# Patient Record
Sex: FEMALE | Race: BLACK OR AFRICAN AMERICAN | NOT HISPANIC OR LATINO | URBAN - METROPOLITAN AREA
[De-identification: names, ages, dates, MRNs, and addresses within clinical notes are randomized per-mention and may not be internally consistent; named-entity substitution may affect disease eponyms.]

---

## 2017-11-12 ENCOUNTER — EMERGENCY (EMERGENCY)
Facility: HOSPITAL | Age: 83
LOS: 1 days | Discharge: HOME | End: 2017-11-12
Attending: EMERGENCY MEDICINE | Admitting: EMERGENCY MEDICINE
Payer: MEDICARE

## 2017-11-12 VITALS
OXYGEN SATURATION: 100 % | RESPIRATION RATE: 18 BRPM | SYSTOLIC BLOOD PRESSURE: 143 MMHG | DIASTOLIC BLOOD PRESSURE: 68 MMHG | HEART RATE: 90 BPM

## 2017-11-12 DIAGNOSIS — J34.9 UNSPECIFIED DISORDER OF NOSE AND NASAL SINUSES: Chronic | ICD-10-CM

## 2017-11-12 PROCEDURE — 99284 EMERGENCY DEPT VISIT MOD MDM: CPT | Mod: GC

## 2017-11-12 RX ORDER — DIAZEPAM 5 MG
1 TABLET ORAL
Qty: 7 | Refills: 0 | OUTPATIENT
Start: 2017-11-12 | End: 2017-11-19

## 2017-11-12 RX ORDER — LIDOCAINE 4 G/100G
1 CREAM TOPICAL ONCE
Qty: 0 | Refills: 0 | Status: COMPLETED | OUTPATIENT
Start: 2017-11-12 | End: 2017-11-12

## 2017-11-12 RX ORDER — KETOROLAC TROMETHAMINE 30 MG/ML
15 SYRINGE (ML) INJECTION ONCE
Qty: 0 | Refills: 0 | Status: DISCONTINUED | OUTPATIENT
Start: 2017-11-12 | End: 2017-11-12

## 2017-11-12 RX ORDER — LIDOCAINE 4 G/100G
1 CREAM TOPICAL
Qty: 2 | Refills: 0 | OUTPATIENT
Start: 2017-11-12 | End: 2017-11-19

## 2017-11-12 RX ADMIN — Medication 15 MILLIGRAM(S): at 12:54

## 2017-11-12 RX ADMIN — Medication 15 MILLIGRAM(S): at 13:11

## 2017-11-12 RX ADMIN — LIDOCAINE 1 PATCH: 4 CREAM TOPICAL at 12:55

## 2017-11-12 NOTE — ED PROVIDER NOTE - OBJECTIVE STATEMENT
83F hx of chronic back pain followed by pain mgmt (currently on Tramadol, Flexeril and Gabapentin), HTN and DM presents with worsening of chronic back pain. Pain is on the right lower back, radiating to the foot same as prior back pain. Her current medications help but not for long. No injury or trauma to the back. No loss of urination or bowel movement or retention. No saddle anesthesia and no focal deficits.

## 2017-11-12 NOTE — ED PROVIDER NOTE - MEDICAL DECISION MAKING DETAILS
Pt with chronic back pain presenting with worsening of the pain, no trauma - pain control, mm relexer and reassess. Pain and NSAID.

## 2017-11-12 NOTE — ED PROVIDER NOTE - ATTENDING CONTRIBUTION TO CARE
Seen and examined, painful ROM with inc. acute on chronic back pain, rad. down leg but not to toes, no weakness, ROM limited due to pain. Pt. takes neurontin, flexeril, tramadol with some relief but today did not improve pt. No direct trauma, no fall/injury. Strength 5/5 distal, normal sensation, able to flex at hip with pain.

## 2017-11-12 NOTE — ED ADULT NURSE NOTE - OBJECTIVE STATEMENT
Patient received in room 19 awake, alert, oriented x3, able to make needs known verbally.  Patient c/o of right back pain extending down to right foot with decreased movement of right leg due to pain.  R pedal pulse present, able to wiggle right toes, ROM of ankle intact.  +1 edema noted to right foot.  Vital signs stable.  Patient states that "at rest the pain is not so bad, when she goes to move the leg or get up the pain gets really bad".  Endorses frequent urination, denies dysuria.  Family at bedside for emotional support.  nad at this time, will continue to monitor.

## 2017-11-12 NOTE — ED ADULT NURSE NOTE - CHIEF COMPLAINT QUOTE
Back pain that radiates down the leg x 2 weeks.  Pt evaluated for same at [facility] and give Flexeril for same, on 10/26/17.  Pt currently on pain management. Pt was unable to schedule follow-up appt with PMD.  Pt with known chronic back pain and instructed to f/u with pain management.

## 2018-08-27 ENCOUNTER — INPATIENT (INPATIENT)
Facility: HOSPITAL | Age: 84
LOS: 1 days | Discharge: HOME HEALTH | End: 2018-08-29
Attending: INTERNAL MEDICINE | Admitting: INTERNAL MEDICINE
Payer: MEDICARE

## 2018-08-27 VITALS
TEMPERATURE: 98 F | RESPIRATION RATE: 16 BRPM | OXYGEN SATURATION: 100 % | DIASTOLIC BLOOD PRESSURE: 78 MMHG | HEART RATE: 72 BPM | SYSTOLIC BLOOD PRESSURE: 149 MMHG

## 2018-08-27 DIAGNOSIS — J34.9 UNSPECIFIED DISORDER OF NOSE AND NASAL SINUSES: Chronic | ICD-10-CM

## 2018-08-27 DIAGNOSIS — Z29.9 ENCOUNTER FOR PROPHYLACTIC MEASURES, UNSPECIFIED: ICD-10-CM

## 2018-08-27 DIAGNOSIS — R42 DIZZINESS AND GIDDINESS: ICD-10-CM

## 2018-08-27 DIAGNOSIS — R53.1 WEAKNESS: ICD-10-CM

## 2018-08-27 DIAGNOSIS — R51 HEADACHE: ICD-10-CM

## 2018-08-27 DIAGNOSIS — K76.0 FATTY (CHANGE OF) LIVER, NOT ELSEWHERE CLASSIFIED: ICD-10-CM

## 2018-08-27 DIAGNOSIS — I10 ESSENTIAL (PRIMARY) HYPERTENSION: ICD-10-CM

## 2018-08-27 DIAGNOSIS — E11.9 TYPE 2 DIABETES MELLITUS WITHOUT COMPLICATIONS: ICD-10-CM

## 2018-08-27 DIAGNOSIS — M54.5 LOW BACK PAIN: ICD-10-CM

## 2018-08-27 DIAGNOSIS — R94.31 ABNORMAL ELECTROCARDIOGRAM [ECG] [EKG]: ICD-10-CM

## 2018-08-27 LAB
ALBUMIN SERPL ELPH-MCNC: 3.9 G/DL — SIGNIFICANT CHANGE UP (ref 3.3–5)
ALP SERPL-CCNC: 77 U/L — SIGNIFICANT CHANGE UP (ref 40–120)
ALT FLD-CCNC: 56 U/L — HIGH (ref 4–33)
APPEARANCE UR: CLEAR — SIGNIFICANT CHANGE UP
AST SERPL-CCNC: 72 U/L — HIGH (ref 4–32)
BASE EXCESS BLDV CALC-SCNC: 7.2 MMOL/L — SIGNIFICANT CHANGE UP
BASOPHILS # BLD AUTO: 0.05 K/UL — SIGNIFICANT CHANGE UP (ref 0–0.2)
BASOPHILS NFR BLD AUTO: 0.7 % — SIGNIFICANT CHANGE UP (ref 0–2)
BILIRUB SERPL-MCNC: 0.5 MG/DL — SIGNIFICANT CHANGE UP (ref 0.2–1.2)
BILIRUB UR-MCNC: NEGATIVE — SIGNIFICANT CHANGE UP
BLOOD GAS VENOUS - CREATININE: 0.84 MG/DL — SIGNIFICANT CHANGE UP (ref 0.5–1.3)
BLOOD UR QL VISUAL: NEGATIVE — SIGNIFICANT CHANGE UP
BUN SERPL-MCNC: 13 MG/DL — SIGNIFICANT CHANGE UP (ref 7–23)
BUN SERPL-MCNC: 15 MG/DL — SIGNIFICANT CHANGE UP (ref 7–23)
CALCIUM SERPL-MCNC: 9.4 MG/DL — SIGNIFICANT CHANGE UP (ref 8.4–10.5)
CALCIUM SERPL-MCNC: 9.4 MG/DL — SIGNIFICANT CHANGE UP (ref 8.4–10.5)
CHLORIDE BLDV-SCNC: 105 MMOL/L — SIGNIFICANT CHANGE UP (ref 96–108)
CHLORIDE SERPL-SCNC: 100 MMOL/L — SIGNIFICANT CHANGE UP (ref 98–107)
CHLORIDE SERPL-SCNC: 98 MMOL/L — SIGNIFICANT CHANGE UP (ref 98–107)
CO2 SERPL-SCNC: 21 MMOL/L — LOW (ref 22–31)
CO2 SERPL-SCNC: 27 MMOL/L — SIGNIFICANT CHANGE UP (ref 22–31)
COLOR SPEC: COLORLESS — SIGNIFICANT CHANGE UP
CREAT SERPL-MCNC: 0.93 MG/DL — SIGNIFICANT CHANGE UP (ref 0.5–1.3)
CREAT SERPL-MCNC: 0.93 MG/DL — SIGNIFICANT CHANGE UP (ref 0.5–1.3)
EOSINOPHIL # BLD AUTO: 0.09 K/UL — SIGNIFICANT CHANGE UP (ref 0–0.5)
EOSINOPHIL NFR BLD AUTO: 1.3 % — SIGNIFICANT CHANGE UP (ref 0–6)
GAS PNL BLDV: 139 MMOL/L — SIGNIFICANT CHANGE UP (ref 136–146)
GLUCOSE BLDV-MCNC: 164 — HIGH (ref 70–99)
GLUCOSE SERPL-MCNC: 156 MG/DL — HIGH (ref 70–99)
GLUCOSE SERPL-MCNC: 168 MG/DL — HIGH (ref 70–99)
GLUCOSE UR-MCNC: NEGATIVE — SIGNIFICANT CHANGE UP
HCO3 BLDV-SCNC: 29 MMOL/L — HIGH (ref 20–27)
HCT VFR BLD CALC: 46 % — HIGH (ref 34.5–45)
HCT VFR BLDV CALC: 46.5 % — HIGH (ref 34.5–45)
HGB BLD-MCNC: 14.8 G/DL — SIGNIFICANT CHANGE UP (ref 11.5–15.5)
HGB BLDV-MCNC: 15.2 G/DL — SIGNIFICANT CHANGE UP (ref 11.5–15.5)
IMM GRANULOCYTES # BLD AUTO: 0.02 # — SIGNIFICANT CHANGE UP
IMM GRANULOCYTES NFR BLD AUTO: 0.3 % — SIGNIFICANT CHANGE UP (ref 0–1.5)
KETONES UR-MCNC: NEGATIVE — SIGNIFICANT CHANGE UP
LACTATE BLDV-MCNC: 1.9 MMOL/L — SIGNIFICANT CHANGE UP (ref 0.5–2)
LEUKOCYTE ESTERASE UR-ACNC: NEGATIVE — SIGNIFICANT CHANGE UP
LIDOCAIN IGE QN: 44.7 U/L — SIGNIFICANT CHANGE UP (ref 7–60)
LYMPHOCYTES # BLD AUTO: 2.63 K/UL — SIGNIFICANT CHANGE UP (ref 1–3.3)
LYMPHOCYTES # BLD AUTO: 37.6 % — SIGNIFICANT CHANGE UP (ref 13–44)
MCHC RBC-ENTMCNC: 29.2 PG — SIGNIFICANT CHANGE UP (ref 27–34)
MCHC RBC-ENTMCNC: 32.2 % — SIGNIFICANT CHANGE UP (ref 32–36)
MCV RBC AUTO: 90.9 FL — SIGNIFICANT CHANGE UP (ref 80–100)
MONOCYTES # BLD AUTO: 0.37 K/UL — SIGNIFICANT CHANGE UP (ref 0–0.9)
MONOCYTES NFR BLD AUTO: 5.3 % — SIGNIFICANT CHANGE UP (ref 2–14)
NEUTROPHILS # BLD AUTO: 3.83 K/UL — SIGNIFICANT CHANGE UP (ref 1.8–7.4)
NEUTROPHILS NFR BLD AUTO: 54.8 % — SIGNIFICANT CHANGE UP (ref 43–77)
NITRITE UR-MCNC: NEGATIVE — SIGNIFICANT CHANGE UP
NRBC # FLD: 0 — SIGNIFICANT CHANGE UP
NT-PROBNP SERPL-SCNC: 48.45 PG/ML — SIGNIFICANT CHANGE UP
PCO2 BLDV: 51 MMHG — SIGNIFICANT CHANGE UP (ref 41–51)
PH BLDV: 7.41 PH — SIGNIFICANT CHANGE UP (ref 7.32–7.43)
PH UR: 7.5 — SIGNIFICANT CHANGE UP (ref 5–8)
PLATELET # BLD AUTO: 268 K/UL — SIGNIFICANT CHANGE UP (ref 150–400)
PMV BLD: 10.6 FL — SIGNIFICANT CHANGE UP (ref 7–13)
PO2 BLDV: 28 MMHG — LOW (ref 35–40)
POTASSIUM BLDV-SCNC: 3.7 MMOL/L — SIGNIFICANT CHANGE UP (ref 3.4–4.5)
POTASSIUM SERPL-MCNC: 3.6 MMOL/L — SIGNIFICANT CHANGE UP (ref 3.5–5.3)
POTASSIUM SERPL-MCNC: SIGNIFICANT CHANGE UP MMOL/L (ref 3.5–5.3)
POTASSIUM SERPL-SCNC: 3.6 MMOL/L — SIGNIFICANT CHANGE UP (ref 3.5–5.3)
POTASSIUM SERPL-SCNC: SIGNIFICANT CHANGE UP MMOL/L (ref 3.5–5.3)
PROT SERPL-MCNC: SIGNIFICANT CHANGE UP G/DL (ref 6–8.3)
PROT UR-MCNC: NEGATIVE — SIGNIFICANT CHANGE UP
RBC # BLD: 5.06 M/UL — SIGNIFICANT CHANGE UP (ref 3.8–5.2)
RBC # FLD: 13.6 % — SIGNIFICANT CHANGE UP (ref 10.3–14.5)
SAO2 % BLDV: 46.9 % — LOW (ref 60–85)
SODIUM SERPL-SCNC: 135 MMOL/L — SIGNIFICANT CHANGE UP (ref 135–145)
SODIUM SERPL-SCNC: 140 MMOL/L — SIGNIFICANT CHANGE UP (ref 135–145)
SP GR SPEC: 1.01 — SIGNIFICANT CHANGE UP (ref 1–1.04)
TROPONIN T, HIGH SENSITIVITY: < 6 NG/L — SIGNIFICANT CHANGE UP (ref ?–14)
UROBILINOGEN FLD QL: NORMAL — SIGNIFICANT CHANGE UP
WBC # BLD: 6.99 K/UL — SIGNIFICANT CHANGE UP (ref 3.8–10.5)
WBC # FLD AUTO: 6.99 K/UL — SIGNIFICANT CHANGE UP (ref 3.8–10.5)

## 2018-08-27 PROCEDURE — 71045 X-RAY EXAM CHEST 1 VIEW: CPT | Mod: 26

## 2018-08-27 PROCEDURE — 70450 CT HEAD/BRAIN W/O DYE: CPT | Mod: 26

## 2018-08-27 PROCEDURE — 76700 US EXAM ABDOM COMPLETE: CPT | Mod: 26

## 2018-08-27 PROCEDURE — 99223 1ST HOSP IP/OBS HIGH 75: CPT

## 2018-08-27 RX ORDER — DONEPEZIL HYDROCHLORIDE 10 MG/1
5 TABLET, FILM COATED ORAL AT BEDTIME
Qty: 0 | Refills: 0 | Status: DISCONTINUED | OUTPATIENT
Start: 2018-08-27 | End: 2018-08-29

## 2018-08-27 RX ORDER — DEXTROSE 50 % IN WATER 50 %
12.5 SYRINGE (ML) INTRAVENOUS ONCE
Qty: 0 | Refills: 0 | Status: DISCONTINUED | OUTPATIENT
Start: 2018-08-27 | End: 2018-08-29

## 2018-08-27 RX ORDER — INSULIN LISPRO 100/ML
VIAL (ML) SUBCUTANEOUS
Qty: 0 | Refills: 0 | Status: DISCONTINUED | OUTPATIENT
Start: 2018-08-27 | End: 2018-08-29

## 2018-08-27 RX ORDER — SODIUM CHLORIDE 9 MG/ML
1000 INJECTION, SOLUTION INTRAVENOUS ONCE
Qty: 0 | Refills: 0 | Status: DISCONTINUED | OUTPATIENT
Start: 2018-08-27 | End: 2018-08-27

## 2018-08-27 RX ORDER — ATORVASTATIN CALCIUM 80 MG/1
40 TABLET, FILM COATED ORAL AT BEDTIME
Qty: 0 | Refills: 0 | Status: DISCONTINUED | OUTPATIENT
Start: 2018-08-27 | End: 2018-08-29

## 2018-08-27 RX ORDER — ACETAMINOPHEN 500 MG
650 TABLET ORAL ONCE
Qty: 0 | Refills: 0 | Status: COMPLETED | OUTPATIENT
Start: 2018-08-27 | End: 2018-08-27

## 2018-08-27 RX ORDER — FAMOTIDINE 10 MG/ML
20 INJECTION INTRAVENOUS ONCE
Qty: 0 | Refills: 0 | Status: COMPLETED | OUTPATIENT
Start: 2018-08-27 | End: 2018-08-27

## 2018-08-27 RX ORDER — AMLODIPINE BESYLATE 2.5 MG/1
10 TABLET ORAL DAILY
Qty: 0 | Refills: 0 | Status: DISCONTINUED | OUTPATIENT
Start: 2018-08-27 | End: 2018-08-29

## 2018-08-27 RX ORDER — DEXTROSE 50 % IN WATER 50 %
25 SYRINGE (ML) INTRAVENOUS ONCE
Qty: 0 | Refills: 0 | Status: DISCONTINUED | OUTPATIENT
Start: 2018-08-27 | End: 2018-08-29

## 2018-08-27 RX ORDER — GLUCAGON INJECTION, SOLUTION 0.5 MG/.1ML
1 INJECTION, SOLUTION SUBCUTANEOUS ONCE
Qty: 0 | Refills: 0 | Status: DISCONTINUED | OUTPATIENT
Start: 2018-08-27 | End: 2018-08-29

## 2018-08-27 RX ORDER — KETOROLAC TROMETHAMINE 30 MG/ML
15 SYRINGE (ML) INJECTION ONCE
Qty: 0 | Refills: 0 | Status: DISCONTINUED | OUTPATIENT
Start: 2018-08-27 | End: 2018-08-27

## 2018-08-27 RX ORDER — REPAGLINIDE 1 MG/1
1 TABLET ORAL
Qty: 0 | Refills: 0 | COMMUNITY

## 2018-08-27 RX ORDER — HEPARIN SODIUM 5000 [USP'U]/ML
5000 INJECTION INTRAVENOUS; SUBCUTANEOUS EVERY 8 HOURS
Qty: 0 | Refills: 0 | Status: DISCONTINUED | OUTPATIENT
Start: 2018-08-27 | End: 2018-08-29

## 2018-08-27 RX ORDER — SODIUM CHLORIDE 9 MG/ML
1000 INJECTION, SOLUTION INTRAVENOUS
Qty: 0 | Refills: 0 | Status: DISCONTINUED | OUTPATIENT
Start: 2018-08-27 | End: 2018-08-29

## 2018-08-27 RX ORDER — METOCLOPRAMIDE HCL 10 MG
10 TABLET ORAL ONCE
Qty: 0 | Refills: 0 | Status: COMPLETED | OUTPATIENT
Start: 2018-08-27 | End: 2018-08-27

## 2018-08-27 RX ORDER — GABAPENTIN 400 MG/1
300 CAPSULE ORAL
Qty: 0 | Refills: 0 | Status: DISCONTINUED | OUTPATIENT
Start: 2018-08-27 | End: 2018-08-29

## 2018-08-27 RX ORDER — LISINOPRIL 2.5 MG/1
20 TABLET ORAL DAILY
Qty: 0 | Refills: 0 | Status: DISCONTINUED | OUTPATIENT
Start: 2018-08-27 | End: 2018-08-29

## 2018-08-27 RX ORDER — DIPHENHYDRAMINE HCL 50 MG
25 CAPSULE ORAL ONCE
Qty: 0 | Refills: 0 | Status: COMPLETED | OUTPATIENT
Start: 2018-08-27 | End: 2018-08-27

## 2018-08-27 RX ORDER — SITAGLIPTIN 50 MG/1
1 TABLET, FILM COATED ORAL
Qty: 0 | Refills: 0 | COMMUNITY

## 2018-08-27 RX ORDER — OXYCODONE HYDROCHLORIDE 5 MG/1
5 TABLET ORAL EVERY 6 HOURS
Qty: 0 | Refills: 0 | Status: DISCONTINUED | OUTPATIENT
Start: 2018-08-27 | End: 2018-08-29

## 2018-08-27 RX ORDER — DEXTROSE 50 % IN WATER 50 %
15 SYRINGE (ML) INTRAVENOUS ONCE
Qty: 0 | Refills: 0 | Status: DISCONTINUED | OUTPATIENT
Start: 2018-08-27 | End: 2018-08-29

## 2018-08-27 RX ORDER — ACETAMINOPHEN 500 MG
650 TABLET ORAL EVERY 6 HOURS
Qty: 0 | Refills: 0 | Status: DISCONTINUED | OUTPATIENT
Start: 2018-08-27 | End: 2018-08-29

## 2018-08-27 RX ADMIN — GABAPENTIN 300 MILLIGRAM(S): 400 CAPSULE ORAL at 17:41

## 2018-08-27 RX ADMIN — Medication 30 MILLILITER(S): at 08:37

## 2018-08-27 RX ADMIN — HEPARIN SODIUM 5000 UNIT(S): 5000 INJECTION INTRAVENOUS; SUBCUTANEOUS at 23:50

## 2018-08-27 RX ADMIN — Medication 15 MILLIGRAM(S): at 19:10

## 2018-08-27 RX ADMIN — Medication 10 MILLIGRAM(S): at 17:41

## 2018-08-27 RX ADMIN — Medication 15 MILLIGRAM(S): at 17:40

## 2018-08-27 RX ADMIN — FAMOTIDINE 20 MILLIGRAM(S): 10 INJECTION INTRAVENOUS at 08:37

## 2018-08-27 RX ADMIN — Medication 25 MILLIGRAM(S): at 17:40

## 2018-08-27 RX ADMIN — Medication 650 MILLIGRAM(S): at 12:32

## 2018-08-27 RX ADMIN — Medication 650 MILLIGRAM(S): at 08:36

## 2018-08-27 RX ADMIN — DONEPEZIL HYDROCHLORIDE 5 MILLIGRAM(S): 10 TABLET, FILM COATED ORAL at 23:50

## 2018-08-27 RX ADMIN — ATORVASTATIN CALCIUM 40 MILLIGRAM(S): 80 TABLET, FILM COATED ORAL at 23:50

## 2018-08-27 NOTE — ED PROVIDER NOTE - OBJECTIVE STATEMENT
84yF hx htn, acid reflux, DM, diabetic neuropathy, chronic back pain. Pt awoke at 3am to go to bthrm and then felt very dizzy and developed epigastric/RUQ abd pain a/w frontal headache, heaviness in chest. No f/c, n/v. Denies cp or sob.  Pt was in usual state of health yesterday. Pt's dtr  1 wk ago. Symptoms have abated now but pt still endorses diffuse sensation of tiredness.  PMD: Maren Haddad

## 2018-08-27 NOTE — H&P ADULT - PROBLEM SELECTOR PLAN 4
Hold oral hypoglycemics while admitted.    -ISS  -monitor FS glucose BP in hypertensive range but acceptable for inpatient setting.     Will resume home dose lisinopril and amlodipine.

## 2018-08-27 NOTE — H&P ADULT - PROBLEM SELECTOR PLAN 7
IMPROVE 2: HSQ for DVT ppx Hepatic steatosis noted on US. Hepatic synthetic function is intact. Patient states she does not consume alcohol    Will continue to encourage lifestyle modifications.

## 2018-08-27 NOTE — H&P ADULT - PROBLEM SELECTOR PLAN 5
Chronic low back pain without sciatica or neurologic findings. Patient states pain is at baseline. She takes Percocet PRN (verified on iSTOP).    -c/w oxycodone prn severe pain, APAP prn mild/moderate pain Hold oral hypoglycemics while admitted.    -ISS  -monitor FS glucose

## 2018-08-27 NOTE — ED ADULT NURSE NOTE - CHIEF COMPLAINT QUOTE
alert c/o SOB generalized weakness and dizziness x 1.5 hours  hx HTN DM high cholesterol    states my chest feels heavy

## 2018-08-27 NOTE — H&P ADULT - PROBLEM SELECTOR PLAN 6
Hepatic steatosis noted on US. Hepatic synthetic function is intact. Patient states she does not consume alcohol    Will continue to encourage lifestyle modifications. Chronic low back pain without sciatica or neurologic findings. Patient states pain is at baseline. She takes Percocet PRN (verified on iSTOP).    -c/w oxycodone prn severe pain, APAP prn mild/moderate pain

## 2018-08-27 NOTE — H&P ADULT - ASSESSMENT
84 W PMH T2DM on insulin, essential HTN, presents with c/o head pressure. Afebrile, hypertensive, normal HR and O2 sat. On exam, her cranial nerves are intact, and she exhibits full strength throughout. Labs without leukocytosis or electrolyte derangement. CTH without acute intracranial pathology. US abdomen with hepatic steatosis.

## 2018-08-27 NOTE — H&P ADULT - PROBLEM SELECTOR PLAN 2
Patient reports vague head pressure that started this morning. CTH without acute findings. Neurologic exam is non-focal.     -Will trial IV Toradol, Reglan, Benadryl x1 for headache abatement  -c/w home dose APAP

## 2018-08-27 NOTE — ED ADULT NURSE REASSESSMENT NOTE - NS ED NURSE REASSESS COMMENT FT1
RN Break Coverage : Alert and oriented x 4. Pt received from BEBETO Bhagat in no distress. Medication given as ordered. Will continue to monitor.

## 2018-08-27 NOTE — H&P ADULT - HISTORY OF PRESENT ILLNESS
HPI:    84 W PMH T2DM on insulin, essential HTN, presents with c/o head pressure. Patient states she awoke today at 3AM and felt frontal forehead pressure. She states it did not feel like a headache and does not describe it as pain. No vision changes. Light does not bother her, nor does sound. No pulsating quality. No h/o migraine headaches or headaches in general. She states she last felt a head pressure like this about 50 years ago when she gave birth to her daughter. She reports no focal weakness. No urinary or fecal incontinence.     After she developed the head pressure this morning, she attempted to use the bathroom but was too weak to walk back to her bedroom and decided to seek medical attention.     Of note, ED documentation describes complaints of chest heaviness. The patient states her chest is not heavy and she does not have chest pain or shortness of breath. The patient states her daughter  a week ago.    PAST MEDICAL & SURGICAL HISTORY:  GERD (gastroesophageal reflux disease)  DM (diabetes mellitus)  HTN (hypertension)  Disorder of nasal sinus      Review of Systems:   CONSTITUTIONAL: No fever, weight loss, or fatigue  EYES: No eye pain, visual disturbances, or discharge  ENMT:  No difficulty hearing, tinnitus, vertigo; No sinus or throat pain  NECK: No pain or stiffness  BREASTS: No pain, masses, or nipple discharge  RESPIRATORY: No cough, wheezing, chills or hemoptysis; No shortness of breath  CARDIOVASCULAR: No chest pain, palpitations, dizziness, or leg swelling  GASTROINTESTINAL: No abdominal or epigastric pain. No nausea, vomiting, or hematemesis; No diarrhea or constipation. No melena or hematochezia.  GENITOURINARY: No dysuria, frequency, hematuria, or incontinence  NEUROLOGICAL: +head pressure, no memory loss, loss of strength, numbness, or tremors  SKIN: No itching, burning, rashes, or lesions   LYMPH NODES: No enlarged glands  ENDOCRINE: No heat or cold intolerance; No hair loss  MUSCULOSKELETAL: No joint pain or swelling; No muscle, back, or extremity pain  PSYCHIATRIC: No depression, anxiety, mood swings, or difficulty sleeping  HEME/LYMPH: No easy bruising, or bleeding gums  ALLERGY AND IMMUNOLOGIC: No hives or eczema    Allergies    penicillin (Unknown)    Intolerances        Social History:   From [place]. Has lived in the  for the last 28 years. No recent travel. Lives with her daughter. Never smoked tobacco. Reports no alcohol or drug use.    FAMILY HISTORY:  No pertinent family history      MEDICATIONS  (STANDING):  dextrose 5%. 1000 milliLiter(s) (50 mL/Hr) IV Continuous <Continuous>  dextrose 50% Injectable 12.5 Gram(s) IV Push once  dextrose 50% Injectable 25 Gram(s) IV Push once  dextrose 50% Injectable 25 Gram(s) IV Push once  diphenhydrAMINE   Injectable 25 milliGRAM(s) IV Push once  heparin  Injectable 5000 Unit(s) SubCutaneous every 8 hours  insulin lispro (HumaLOG) corrective regimen sliding scale   SubCutaneous three times a day before meals  ketorolac   Injectable 15 milliGRAM(s) IV Push once  metoclopramide Injectable 10 milliGRAM(s) IV Push once    MEDICATIONS  (PRN):  dextrose 40% Gel 15 Gram(s) Oral once PRN Blood Glucose LESS THAN 70 milliGRAM(s)/deciliter  glucagon  Injectable 1 milliGRAM(s) IntraMuscular once PRN Glucose LESS THAN 70 milligrams/deciliter      T(C): 36.8 (18 @ 05:21), Max: 36.8 (18 @ 05:21)  HR: 74 (18 @ 11:45) (69 - 74)  BP: 160/78 (18 @ 11:45) (149/78 - 160/78)  RR: 16 (18 @ 11:45) (16 - 20)  SpO2: 96% (18 @ 11:45) (96% - 100%)    CAPILLARY BLOOD GLUCOSE      POCT Blood Glucose.: 132 mg/dL (27 Aug 2018 13:33)  POCT Blood Glucose.: 151 mg/dL (27 Aug 2018 05:28)    I&O's Summary      PHYSICAL EXAM:  GENERAL: WN/WD woman, lying in stretcher, sad appearing    HEAD:  Atraumatic, Normocephalic, moist oral mucosa  EYES: EOMI, PERRLA, conjunctiva and sclera clear  NECK: Supple, No elevated JVD  CHEST/LUNG: Clear to auscultation bilaterally; No wheeze  HEART: Regular rate and rhythm; No murmurs, rubs, or gallops  ABDOMEN: Soft, Nontender, Nondistended; Bowel sounds present  EXTREMITIES:  2+ Peripheral Pulses, No clubbing, cyanosis, or edema  PSYCH: AAOx3  NEUROLOGY: CN II-XII grossly intact, moving all extremities  SKIN: No rashes or lesions    LABS:                        14.8   6.99  )-----------( 268      ( 27 Aug 2018 08:20 )             46.0         140  |  100  |  13  ----------------------------<  156<H>  3.6   |  27  |  0.93    Ca    9.4      27 Aug 2018 11:00    TPro  Test not performed SPECIMEN GROSSLY HEMOLYZED  /  Alb  3.9  /  TBili  0.5  /  DBili  x   /  AST  72<H>  /  ALT  56<H>  /  AlkPhos  77            Urinalysis Basic - ( 27 Aug 2018 10:00 )    Color: COLORLESS / Appearance: CLEAR / S.009 / pH: 7.5  Gluc: NEGATIVE / Ketone: NEGATIVE  / Bili: NEGATIVE / Urobili: NORMAL   Blood: NEGATIVE / Protein: NEGATIVE / Nitrite: NEGATIVE   Leuk Esterase: NEGATIVE / RBC: x / WBC x   Sq Epi: x / Non Sq Epi: x / Bacteria: x          RADIOLOGY & ADDITIONAL TESTS:    ECG Personally Reviewed - NSR, rate 75; RBBB, ?ST depressions in II, V4-V6    Imaging Personally Reviewed: CTH - chronic microvascular ischemic changes, no acute intracranial pathology    Consultant(s) Notes Reviewed:      Care Discussed with Consultants/Other Providers:

## 2018-08-27 NOTE — ED ADULT TRIAGE NOTE - CHIEF COMPLAINT QUOTE
alert c/o SOB and dizziness x 1.5 hours  hx HTN DM high cholesterol    states my chest feels heavy    alert c/o SOB generalized weakness and dizziness x 1.5 hours  hx HTN DM high cholesterol    states my chest feels heavy

## 2018-08-27 NOTE — ED PROVIDER NOTE - ATTENDING CONTRIBUTION TO CARE
DR. MERCADO, ATTENDING MD-  I performed a face to face bedside interview with patient regarding history of present illness, review of symptoms and past medical history. I completed an independent physical exam.  I have discussed patient's plan of care with the resident.   Documentation as above in the note.   HPI: 83 yo F Past Medical History of HTN, GERD, DM, with neuropathy, chronic back pain that presents with dizziness. Pt woke up 3 hours prior to arrival to urinate, felt dizziness, lightheaded, NOT room spinning, also felt epigastric pain non radiating without nausea, vomiting, diarrhea, fever, chills, chest pain, SOB.   EXAM: tenderness to palpation epigatrium and + murphys, CN 2-12 intact, eyes without nystagmus, finger to nose normal. TMs normal. 1+ pitting edema in BLE, no unilateral leg swelling.   MDM: concern for cholecystitis vs GERD and daughter reports not taking in much PO hydration. Will obtain labs, imaging, and reassess. DR. MERCADO, ATTENDING MD-  I performed a face to face bedside interview with patient regarding history of present illness, review of symptoms and past medical history. I completed an independent physical exam.  I have discussed patient's plan of care with the resident.   Documentation as above in the note.   HPI: 85 yo F Past Medical History of HTN, GERD, DM, with neuropathy, chronic back pain that presents with dizziness. Pt woke up 3 hours prior to arrival to urinate, felt dizziness, lightheaded, NOT room spinning, also felt epigastric pain non radiating without nausea, vomiting, diarrhea, fever, chills, chest pain, SOB.   EXAM: tenderness to palpation epigatrium and + murphys, CN 2-12 intact, eyes without nystagmus, finger to nose normal. TMs normal. 1+ pitting edema in BLE, no unilateral leg swelling.   MDM: concern for cholecystitis vs GERD vs ACS and daughter reports not taking in much PO hydration. Will obtain labs, imaging, and reassess.

## 2018-08-27 NOTE — H&P ADULT - PROBLEM SELECTOR PROBLEM 5
Chronic bilateral low back pain without sciatica Type 2 diabetes mellitus without complication, without long-term current use of insulin

## 2018-08-27 NOTE — ED PROVIDER NOTE - PROGRESS NOTE DETAILS
Gray: pt with negative US, and trop, reassessed and unable to ambulate, admit to hospitalist for inability ambulate and further workup

## 2018-08-27 NOTE — H&P ADULT - PROBLEM SELECTOR PLAN 3
BP in hypertensive range but acceptable for inpatient setting.     Will resume home dose lisinopril and amlodipine. ECG NSR with RBB and TWI present on ECG in 2015. TWI likely repolarization abnormality. Patient reports no chest pain or shortness of breath. hsTPN negative >3 hours from onset of patient's presenting complaint. Very low suspicion for myocardial infarction/ischemia.

## 2018-08-27 NOTE — ED ADULT NURSE NOTE - OBJECTIVE STATEMENT
pt received to 10, aox3.  pt c/o chest heaviness nd dizziness with headache x a few days.  pt on tele monitor, v/s a snoted.  SL placed, labs sent, pt denies n/v and SOB.  awaiting further orders, kelly levin

## 2018-08-27 NOTE — H&P ADULT - PROBLEM SELECTOR PLAN 1
Patient presents with c/o head pressure and generalized weakness. CTH without acute pathology. No focal neurologic findings on exam. Electrolytes wnl. Patient reports recent major stressor (death of daughter) which may be contributing to her condition.     -treat head pressure as below  -PT evaluation

## 2018-08-28 DIAGNOSIS — Z63.4 DISAPPEARANCE AND DEATH OF FAMILY MEMBER: ICD-10-CM

## 2018-08-28 DIAGNOSIS — F43.21 ADJUSTMENT DISORDER WITH DEPRESSED MOOD: ICD-10-CM

## 2018-08-28 LAB — SPECIMEN SOURCE: SIGNIFICANT CHANGE UP

## 2018-08-28 PROCEDURE — 99222 1ST HOSP IP/OBS MODERATE 55: CPT

## 2018-08-28 RX ORDER — SODIUM CHLORIDE 9 MG/ML
1000 INJECTION INTRAMUSCULAR; INTRAVENOUS; SUBCUTANEOUS
Qty: 0 | Refills: 0 | Status: DISCONTINUED | OUTPATIENT
Start: 2018-08-28 | End: 2018-08-29

## 2018-08-28 RX ADMIN — ATORVASTATIN CALCIUM 40 MILLIGRAM(S): 80 TABLET, FILM COATED ORAL at 21:09

## 2018-08-28 RX ADMIN — HEPARIN SODIUM 5000 UNIT(S): 5000 INJECTION INTRAVENOUS; SUBCUTANEOUS at 21:08

## 2018-08-28 RX ADMIN — SODIUM CHLORIDE 70 MILLILITER(S): 9 INJECTION INTRAMUSCULAR; INTRAVENOUS; SUBCUTANEOUS at 18:23

## 2018-08-28 RX ADMIN — HEPARIN SODIUM 5000 UNIT(S): 5000 INJECTION INTRAVENOUS; SUBCUTANEOUS at 15:09

## 2018-08-28 RX ADMIN — GABAPENTIN 300 MILLIGRAM(S): 400 CAPSULE ORAL at 05:15

## 2018-08-28 RX ADMIN — GABAPENTIN 300 MILLIGRAM(S): 400 CAPSULE ORAL at 18:23

## 2018-08-28 RX ADMIN — HEPARIN SODIUM 5000 UNIT(S): 5000 INJECTION INTRAVENOUS; SUBCUTANEOUS at 05:15

## 2018-08-28 RX ADMIN — AMLODIPINE BESYLATE 10 MILLIGRAM(S): 2.5 TABLET ORAL at 05:15

## 2018-08-28 RX ADMIN — Medication 2: at 12:07

## 2018-08-28 RX ADMIN — DONEPEZIL HYDROCHLORIDE 5 MILLIGRAM(S): 10 TABLET, FILM COATED ORAL at 21:09

## 2018-08-28 RX ADMIN — LISINOPRIL 20 MILLIGRAM(S): 2.5 TABLET ORAL at 05:15

## 2018-08-28 SDOH — SOCIAL STABILITY - SOCIAL INSECURITY: DISSAPEARANCE AND DEATH OF FAMILY MEMBER: Z63.4

## 2018-08-28 NOTE — BEHAVIORAL HEALTH ASSESSMENT NOTE - RISK ASSESSMENT
risk for harm conferred by recent loss, though protected against by future orientation, religiousness, supportive family friends, help seeking behavior, lack of hopelessness/worthlessness.

## 2018-08-28 NOTE — BEHAVIORAL HEALTH ASSESSMENT NOTE - SUMMARY
84 year old [facility] female, no PPH, PMH of T2DM on insulin, essential HTN, back pain, admitted for increased head pressure. Psych c/s for depression/coping after recent death of daughter.    Patient does not meet criterion for MDD or other mood disorder, is coping with daughter with the tools that she has available to her, has been recruiting supports (family, friends), praying, and thinking fondly about the future. No SI/I/P. No meds indicated

## 2018-08-28 NOTE — BEHAVIORAL HEALTH ASSESSMENT NOTE - NSBHSUICPROTECTFACT_PSY_A_CORE
Responsibility to family and others/Future oriented/Supportive social network or family/High spirituality/Identifies reasons for living

## 2018-08-28 NOTE — PHYSICAL THERAPY INITIAL EVALUATION ADULT - GAIT DISTANCE, PT EVAL
after ambulating ~ 35 feet pt c/o feeling dizzy, pt took a standing rest break, stated dizziness was slightly improving and pt was then able to ambulate back to her room.  BP taken once seated on bed 151/90 , HR 82.  BEBETO Arora made aware./75 feet

## 2018-08-28 NOTE — BEHAVIORAL HEALTH ASSESSMENT NOTE - NSBHCONSULTFOLLOWAFTERCARE_PSY_A_CORE FT
[facility] [phone] 9a-7p MWF, or call [phone] for regular appts.

## 2018-08-28 NOTE — BEHAVIORAL HEALTH ASSESSMENT NOTE - NSBHCONSULTMEDS_PSY_A_CORE FT
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  - agree with chaplaincy c/s, holistic support, lcsw f/u  - consider canine therapy (pt is dog lover)

## 2018-08-28 NOTE — PHYSICAL THERAPY INITIAL EVALUATION ADULT - PERTINENT HX OF CURRENT PROBLEM, REHAB EVAL
84 y.o. female with PMH T2DM on insulin, essential HTN, presents with c/o head pressure. CTH without acute intracranial pathology. US abdomen with hepatic steatosis.

## 2018-08-28 NOTE — BEHAVIORAL HEALTH ASSESSMENT NOTE - NSBHCHARTREVIEWLAB_PSY_A_CORE FT
08-27    140  |  100  |  13  ----------------------------<  156<H>  3.6   |  27  |  0.93    Ca    9.4      27 Aug 2018 11:00    TPro  Test not performed SPECIMEN GROSSLY HEMOLYZED  /  Alb  3.9  /  TBili  0.5  /  DBili  x   /  AST  72<H>  /  ALT  56<H>  /  AlkPhos  77  08-27                          14.8   6.99  )-----------( 268      ( 27 Aug 2018 08:20 )             46.0

## 2018-08-28 NOTE — BEHAVIORAL HEALTH ASSESSMENT NOTE - NSBHCHARTREVIEWVS_PSY_A_CORE FT
Vital Signs Last 24 Hrs  T(C): 36.8 (28 Aug 2018 10:28), Max: 36.8 (28 Aug 2018 10:28)  T(F): 98.2 (28 Aug 2018 10:28), Max: 98.2 (28 Aug 2018 10:28)  HR: 73 (28 Aug 2018 10:28) (58 - 75)  BP: 145/71 (28 Aug 2018 10:28) (124/78 - 159/85)  BP(mean): 103 (27 Aug 2018 20:30) (103 - 103)  RR: 18 (28 Aug 2018 10:28) (14 - 19)  SpO2: 100% (28 Aug 2018 10:28) (98% - 100%)

## 2018-08-28 NOTE — PHYSICAL THERAPY INITIAL EVALUATION ADULT - LIVES WITH, PROFILE
Daughter (dtr is her home health aide) ; house with steps (pt states she goes up/down on her buttocks 2/2 chronic back pain)

## 2018-08-28 NOTE — PROGRESS NOTE ADULT - ASSESSMENT
Problem/Plan - 1:  ·  Problem: Generalized weakness.  Plan: Patient presents with c/o head pressure and generalized weakness. CTH without acute pathology.  likely sec to stress    Problem/Plan - 2:  ·  Problem: Head pain.  Plan: likely stress related  neuro fu    Problem/Plan - 3:  ·  Problem: Abnormal ECG. Plan cards fu    Problem/Plan - 4:  ·  Problem: Type 2 diabetes mellitus without complication, without long-term current use of insulin. Plan: Hold oral hypoglycemics while admitted.  -ISS  -monitor FS glucose.    Problem/Plan - 6:  Problem: Chronic bilateral low back pain without sciatica.Plan: Chronic low back pain without sciatica or neurologic findings. Patient states pain is at baseline. She takes Percocet PRN  neuro following

## 2018-08-29 VITALS
OXYGEN SATURATION: 96 % | SYSTOLIC BLOOD PRESSURE: 121 MMHG | RESPIRATION RATE: 15 BRPM | DIASTOLIC BLOOD PRESSURE: 62 MMHG | TEMPERATURE: 99 F | HEART RATE: 58 BPM

## 2018-08-29 LAB
BACTERIA UR CULT: SIGNIFICANT CHANGE UP
GLUCOSE BLDC GLUCOMTR-MCNC: 141 MG/DL — HIGH (ref 70–99)

## 2018-08-29 PROCEDURE — 70551 MRI BRAIN STEM W/O DYE: CPT | Mod: 26

## 2018-08-29 RX ORDER — DONEPEZIL HYDROCHLORIDE 10 MG/1
1 TABLET, FILM COATED ORAL
Qty: 0 | Refills: 0 | COMMUNITY
Start: 2018-08-29

## 2018-08-29 RX ORDER — ASPIRIN/CALCIUM CARB/MAGNESIUM 324 MG
1 TABLET ORAL
Qty: 0 | Refills: 0 | COMMUNITY

## 2018-08-29 RX ORDER — ATORVASTATIN CALCIUM 80 MG/1
1 TABLET, FILM COATED ORAL
Qty: 0 | Refills: 0 | COMMUNITY
Start: 2018-08-29

## 2018-08-29 RX ORDER — GABAPENTIN 400 MG/1
1 CAPSULE ORAL
Qty: 0 | Refills: 0 | COMMUNITY
Start: 2018-08-29

## 2018-08-29 RX ORDER — FAMOTIDINE 10 MG/ML
1 INJECTION INTRAVENOUS
Qty: 0 | Refills: 0 | COMMUNITY

## 2018-08-29 RX ORDER — AMLODIPINE BESYLATE 2.5 MG/1
1 TABLET ORAL
Qty: 0 | Refills: 0 | COMMUNITY

## 2018-08-29 RX ORDER — LISINOPRIL 2.5 MG/1
1 TABLET ORAL
Qty: 30 | Refills: 0 | OUTPATIENT
Start: 2018-08-29 | End: 2018-09-27

## 2018-08-29 RX ORDER — AMLODIPINE BESYLATE 2.5 MG/1
1 TABLET ORAL
Qty: 0 | Refills: 0 | COMMUNITY
Start: 2018-08-29

## 2018-08-29 RX ORDER — ATORVASTATIN CALCIUM 80 MG/1
1 TABLET, FILM COATED ORAL
Qty: 0 | Refills: 0 | COMMUNITY

## 2018-08-29 RX ORDER — ACETAMINOPHEN 500 MG
2 TABLET ORAL
Qty: 0 | Refills: 0 | COMMUNITY

## 2018-08-29 RX ORDER — ACETAMINOPHEN 500 MG
2 TABLET ORAL
Qty: 0 | Refills: 0 | COMMUNITY
Start: 2018-08-29

## 2018-08-29 RX ORDER — DONEPEZIL HYDROCHLORIDE 10 MG/1
1 TABLET, FILM COATED ORAL
Qty: 0 | Refills: 0 | COMMUNITY

## 2018-08-29 RX ORDER — GABAPENTIN 400 MG/1
1 CAPSULE ORAL
Qty: 0 | Refills: 0 | COMMUNITY

## 2018-08-29 RX ADMIN — GABAPENTIN 300 MILLIGRAM(S): 400 CAPSULE ORAL at 05:53

## 2018-08-29 RX ADMIN — SODIUM CHLORIDE 70 MILLILITER(S): 9 INJECTION INTRAMUSCULAR; INTRAVENOUS; SUBCUTANEOUS at 05:54

## 2018-08-29 RX ADMIN — HEPARIN SODIUM 5000 UNIT(S): 5000 INJECTION INTRAVENOUS; SUBCUTANEOUS at 14:11

## 2018-08-29 RX ADMIN — AMLODIPINE BESYLATE 10 MILLIGRAM(S): 2.5 TABLET ORAL at 05:54

## 2018-08-29 RX ADMIN — HEPARIN SODIUM 5000 UNIT(S): 5000 INJECTION INTRAVENOUS; SUBCUTANEOUS at 05:54

## 2018-08-29 RX ADMIN — LISINOPRIL 20 MILLIGRAM(S): 2.5 TABLET ORAL at 05:53

## 2018-08-29 NOTE — CONSULT NOTE ADULT - SUBJECTIVE AND OBJECTIVE BOX
[facility] [place] [facility]        Patient is a 84y old  Female who presents with a chief complaint of Head pressure (27 Aug 2018 16:32)      HPI:  HPI:    84 W PMH T2DM on insulin, essential HTN, presents with c/o head pressure. Patient states she awoke today at 3AM and felt frontal forehead pressure. She states it did not feel like a headache and does not describe it as pain. No vision changes. Light does not bother her, nor does sound. No pulsating quality. No h/o migraine headaches or headaches in general. She states she last felt a head pressure like this about 50 years ago when she gave birth to her daughter. She reports no focal weakness. No urinary or fecal incontinence.     After she developed the head pressure this morning, she attempted to use the bathroom but was too weak to walk back to her bedroom and decided to seek medical attention.     Of note, ED documentation describes complaints of chest heaviness. The patient states her chest is not heavy and she does not have chest pain or shortness of breath. The patient states her daughter  a week ago.    PAST MEDICAL & SURGICAL HISTORY:  GERD (gastroesophageal reflux disease)  DM (diabetes mellitus)  HTN (hypertension)  Disorder of nasal sinus      Review of Systems:   CONSTITUTIONAL: No fever, weight loss, or fatigue  EYES: No eye pain, visual disturbances, or discharge  ENMT:  No difficulty hearing, tinnitus, vertigo; No sinus or throat pain  NECK: No pain or stiffness  BREASTS: No pain, masses, or nipple discharge  RESPIRATORY: No cough, wheezing, chills or hemoptysis; No shortness of breath  CARDIOVASCULAR: No chest pain, palpitations, dizziness, or leg swelling  GASTROINTESTINAL: No abdominal or epigastric pain. No nausea, vomiting, or hematemesis; No diarrhea or constipation. No melena or hematochezia.  GENITOURINARY: No dysuria, frequency, hematuria, or incontinence  NEUROLOGICAL: +head pressure, no memory loss, loss of strength, numbness, or tremors  SKIN: No itching, burning, rashes, or lesions   LYMPH NODES: No enlarged glands  ENDOCRINE: No heat or cold intolerance; No hair loss  MUSCULOSKELETAL: No joint pain or swelling; No muscle, back, or extremity pain  PSYCHIATRIC: No depression, anxiety, mood swings, or difficulty sleeping  HEME/LYMPH: No easy bruising, or bleeding gums  ALLERGY AND IMMUNOLOGIC: No hives or eczema    Allergies    penicillin (Unknown)    Intolerances        Social History:   From [place]. Has lived in the  for the last 28 years. No recent travel. Lives with her daughter. Never smoked tobacco. Reports no alcohol or drug use.    FAMILY HISTORY:  No pertinent family history      MEDICATIONS  (STANDING):  dextrose 5%. 1000 milliLiter(s) (50 mL/Hr) IV Continuous <Continuous>  dextrose 50% Injectable 12.5 Gram(s) IV Push once  dextrose 50% Injectable 25 Gram(s) IV Push once  dextrose 50% Injectable 25 Gram(s) IV Push once  diphenhydrAMINE   Injectable 25 milliGRAM(s) IV Push once  heparin  Injectable 5000 Unit(s) SubCutaneous every 8 hours  insulin lispro (HumaLOG) corrective regimen sliding scale   SubCutaneous three times a day before meals  ketorolac   Injectable 15 milliGRAM(s) IV Push once  metoclopramide Injectable 10 milliGRAM(s) IV Push once    MEDICATIONS  (PRN):  dextrose 40% Gel 15 Gram(s) Oral once PRN Blood Glucose LESS THAN 70 milliGRAM(s)/deciliter  glucagon  Injectable 1 milliGRAM(s) IntraMuscular once PRN Glucose LESS THAN 70 milligrams/deciliter      T(C): 36.8 (18 @ 05:21), Max: 36.8 (18 @ 05:21)  HR: 74 (18 @ 11:45) (69 - 74)  BP: 160/78 (18 @ 11:45) (149/78 - 160/78)  RR: 16 (18 @ 11:45) (16 - 20)  SpO2: 96% (18 @ 11:45) (96% - 100%)    CAPILLARY BLOOD GLUCOSE      POCT Blood Glucose.: 132 mg/dL (27 Aug 2018 13:33)  POCT Blood Glucose.: 151 mg/dL (27 Aug 2018 05:28)    I&O's Summary      PHYSICAL EXAM:  GENERAL: WN/WD woman, lying in stretcher, sad appearing    HEAD:  Atraumatic, Normocephalic, moist oral mucosa  EYES: EOMI, PERRLA, conjunctiva and sclera clear  NECK: Supple, No elevated JVD  CHEST/LUNG: Clear to auscultation bilaterally; No wheeze  HEART: Regular rate and rhythm; No murmurs, rubs, or gallops  ABDOMEN: Soft, Nontender, Nondistended; Bowel sounds present  EXTREMITIES:  2+ Peripheral Pulses, No clubbing, cyanosis, or edema  PSYCH: AAOx3  NEUROLOGY: CN II-XII grossly intact, moving all extremities  SKIN: No rashes or lesions    LABS:                        14.8   6.99  )-----------( 268      ( 27 Aug 2018 08:20 )             46.0         140  |  100  |  13  ----------------------------<  156<H>  3.6   |  27  |  0.93    Ca    9.4      27 Aug 2018 11:00    TPro  Test not performed SPECIMEN GROSSLY HEMOLYZED  /  Alb  3.9  /  TBili  0.5  /  DBili  x   /  AST  72<H>  /  ALT  56<H>  /  AlkPhos  77            Urinalysis Basic - ( 27 Aug 2018 10:00 )    Color: COLORLESS / Appearance: CLEAR / S.009 / pH: 7.5  Gluc: NEGATIVE / Ketone: NEGATIVE  / Bili: NEGATIVE / Urobili: NORMAL   Blood: NEGATIVE / Protein: NEGATIVE / Nitrite: NEGATIVE   Leuk Esterase: NEGATIVE / RBC: x / WBC x   Sq Epi: x / Non Sq Epi: x / Bacteria: x          RADIOLOGY & ADDITIONAL TESTS:    ECG Personally Reviewed - NSR, rate 75; RBBB, ?ST depressions in II, V4-V6    Imaging Personally Reviewed: CTH - chronic microvascular ischemic changes, no acute intracranial pathology    Consultant(s) Notes Reviewed:      Care Discussed with Consultants/Other Providers: (27 Aug 2018 16:32)           *****PAST MEDICAL / Surgical  HISTORY:  PAST MEDICAL & SURGICAL HISTORY:  GERD (gastroesophageal reflux disease)  DM (diabetes mellitus)  HTN (hypertension)  Disorder of nasal sinus           *****FAMILY HISTORY:  FAMILY HISTORY:  No pertinent family history           *****SOCIAL HISTORY:  Alcohol: None  Smoking: None         *****ALLERGIES:   Allergies    penicillin (Unknown)    Intolerances             *****MEDICATIONS: current medication reviewed and documented.   MEDICATIONS  (STANDING):  amLODIPine   Tablet 10 milliGRAM(s) Oral daily  atorvastatin 40 milliGRAM(s) Oral at bedtime  dextrose 5%. 1000 milliLiter(s) (50 mL/Hr) IV Continuous <Continuous>  dextrose 50% Injectable 12.5 Gram(s) IV Push once  dextrose 50% Injectable 25 Gram(s) IV Push once  dextrose 50% Injectable 25 Gram(s) IV Push once  donepezil 5 milliGRAM(s) Oral at bedtime  gabapentin 300 milliGRAM(s) Oral two times a day  heparin  Injectable 5000 Unit(s) SubCutaneous every 8 hours  insulin lispro (HumaLOG) corrective regimen sliding scale   SubCutaneous three times a day before meals  lisinopril 20 milliGRAM(s) Oral daily    MEDICATIONS  (PRN):  acetaminophen   Tablet. 650 milliGRAM(s) Oral every 6 hours PRN mild and moderate pain  dextrose 40% Gel 15 Gram(s) Oral once PRN Blood Glucose LESS THAN 70 milliGRAM(s)/deciliter  glucagon  Injectable 1 milliGRAM(s) IntraMuscular once PRN Glucose LESS THAN 70 milligrams/deciliter  oxyCODONE    IR 5 milliGRAM(s) Oral every 6 hours PRN Severe Pain (7 - 10)           *****REVIEW OF SYSTEM:  GEN: no fever, no chills, no pain  RESP: no SOB, no cough, no sputum  CVS: no chest pain, no palpitations, no edema  GI: no abdominal pain, no nausea, no vomiting, no constipation, no diarrhea  : no dysurea, no frequency, no hematurea  Neuro: no headache, no dizziness  PSYCH: no anxiety, no depression  Derm : no itching, no rash         *****VITAL SIGNS:  T(C): 36.8 (18 @ 10:28), Max: 36.8 (18 @ 10:28)  HR: 73 (18 @ 10:28) (58 - 75)  BP: 145/71 (18 @ 10:28) (124/78 - 159/85)  RR: 18 (18 @ 10:28) (14 - 19)  SpO2: 100% (18 @ 10:28) (98% - 100%)  Wt(kg): --           *****PHYSICAL EXAM:       Alert oriented x 3   Attention comprehension are fair. Able to name, repeat, read without any difficulty.   Able to follow 3 step commands.     EOMI fundi not visualized,  VFF to confrontration  No facial asymmetry   Tongue is midline   Palate elevates symmetrically   Moving all 4 ext symmetrically no pronator drift.  Reflexes are symmetric throughout   sensation is grossly symmetric  Gait : not assessed.  B/L down going toes               *****LAB AND IMAGIN.8   6.99  )-----------( 268      ( 27 Aug 2018 08:20 )             46.0                   140  |  100  |  13  ----------------------------<  156<H>  3.6   |  27  |  0.93    Ca    9.4      27 Aug 2018 11:00    TPro  Test not performed SPECIMEN GROSSLY HEMOLYZED  /  Alb  3.9  /  TBili  0.5  /  DBili  x   /  AST  72<H>  /  ALT  56<H>  /  AlkPhos  77                              Urinalysis Basic - ( 27 Aug 2018 10:00 )    Color: COLORLESS / Appearance: CLEAR / S.009 / pH: 7.5  Gluc: NEGATIVE / Ketone: NEGATIVE  / Bili: NEGATIVE / Urobili: NORMAL   Blood: NEGATIVE / Protein: NEGATIVE / Nitrite: NEGATIVE   Leuk Esterase: NEGATIVE / RBC: x / WBC x   Sq Epi: x / Non Sq Epi: x / Bacteria: x        [All pertinent recent Imaging reports reviewed]         *****A S S E S S M E N T   A N D   P L A N :        Problem/Recommendations 1:      Problem/Recommendations 2:      Differential diagnosis and plan of care discussed with patient after the evaluation.   Advanced care planning options discussed.   Pain assessed and judicious use of narcotics when appropriate was discussed.  Importance of Fall prevention discussed.  Counseling on Smoking and Alcohol cessation was offered when appropriate.    80 minutes spent on the total encounter;  more than 50 % of the visit was spent on counseling  and or coordinating care by the attending physician.    Thank you for allowing me to participate in the care of this roxy patient. Please do not hesitate to call me if you have any questions.   ___________________________  Will follow with you.  Thank you,  Sandy Benton MD  Diplomate of the American Board of Neurology and Psychiatry.  Diplomate of the American Board of Vascular Neurology.   [facility] [place] [facility] ([facility]), [facility]   Ph: [phone]      This and subsequent notes were partially created using voice recognition software and will  inherently be subject to errors including those of syntax and sound alike substitutions which may escape proofreading. In such instances original meaning may be extrapolated by contextual derivation.
HISTORY OF PRESENT ILLNESS:  84 year old female with  T2DM on insulin, essential HTN, presents with c/o head pressure. Patient states she awoke today at 3AM and felt frontal forehead pressure. She states it did not feel like a headache and does not describe it as pain. No vision changes. Light does not bother her, nor does sound. No pulsating quality. No h/o migraine headaches or headaches in general. She states she last felt a head pressure like this about 50 years ago when she gave birth to her daughter. She reports no focal weakness. No urinary or fecal incontinence.  She denies any anginal symptoms, syncope or near syncope.         PAST MEDICAL & SURGICAL HISTORY:  GERD (gastroesophageal reflux disease)  DM (diabetes mellitus)  HTN (hypertension)  Disorder of nasal sinus      Review of Systems:   CONSTITUTIONAL: No fever, weight loss, or fatigue  EYES: No eye pain, visual disturbances, or discharge  ENMT:  No difficulty hearing, tinnitus, vertigo; No sinus or throat pain  NECK: No pain or stiffness  BREASTS: No pain, masses, or nipple discharge  RESPIRATORY: No cough, wheezing, chills or hemoptysis; No shortness of breath  CARDIOVASCULAR: No chest pain, palpitations, dizziness, or leg swelling  GASTROINTESTINAL: No abdominal or epigastric pain. No nausea, vomiting, or hematemesis; No diarrhea or constipation. No melena or hematochezia.  GENITOURINARY: No dysuria, frequency, hematuria, or incontinence  NEUROLOGICAL: +head pressure, no memory loss, loss of strength, numbness, or tremors  SKIN: No itching, burning, rashes, or lesions   LYMPH NODES: No enlarged glands  ENDOCRINE: No heat or cold intolerance; No hair loss  MUSCULOSKELETAL: No joint pain or swelling; No muscle, back, or extremity pain  PSYCHIATRIC: No depression, anxiety, mood swings, or difficulty sleeping  HEME/LYMPH: No easy bruising, or bleeding gums  ALLERGY AND IMMUNOLOGIC: No hives or eczema    Allergies  penicillin (Unknown)      Social History:   From [place]. Has lived in the  for the last 28 years. No recent travel. Lives with her daughter. Never smoked tobacco. Reports no alcohol or drug use.    FAMILY HISTORY:  No pertinent family history      MEDICATIONS  (STANDING):  dextrose 5%. 1000 milliLiter(s) (50 mL/Hr) IV Continuous <Continuous>  dextrose 50% Injectable 12.5 Gram(s) IV Push once  dextrose 50% Injectable 25 Gram(s) IV Push once  dextrose 50% Injectable 25 Gram(s) IV Push once  diphenhydrAMINE   Injectable 25 milliGRAM(s) IV Push once  heparin  Injectable 5000 Unit(s) SubCutaneous every 8 hours  insulin lispro (HumaLOG) corrective regimen sliding scale   SubCutaneous three times a day before meals  ketorolac   Injectable 15 milliGRAM(s) IV Push once  metoclopramide Injectable 10 milliGRAM(s) IV Push once    MEDICATIONS  (PRN):  dextrose 40% Gel 15 Gram(s) Oral once PRN Blood Glucose LESS THAN 70 milliGRAM(s)/deciliter  glucagon  Injectable 1 milliGRAM(s) IntraMuscular once PRN Glucose LESS THAN 70 milligrams/deciliter      T(C): 36.8 (18 @ 05:21), Max: 36.8 (18 @ 05:21)  HR: 74 (18 @ 11:45) (69 - 74)  BP: 160/78 (18 @ 11:45) (149/78 - 160/78)  RR: 16 (18 @ 11:45) (16 - 20)  SpO2: 96% (18 @ 11:45) (96% - 100%)    CAPILLARY BLOOD GLUCOSE      POCT Blood Glucose.: 132 mg/dL (27 Aug 2018 13:33)  POCT Blood Glucose.: 151 mg/dL (27 Aug 2018 05:28)    I&O's Summary      PHYSICAL EXAM:  GENERAL: WN/WD woman, lying in stretcher, sad appearing    HEAD:  Atraumatic, Normocephalic, moist oral mucosa  EYES: EOMI, PERRLA, conjunctiva and sclera clear  NECK: Supple, No elevated JVD  CHEST/LUNG: Clear to auscultation bilaterally; No wheeze  HEART: Regular rate and rhythm; No murmurs, rubs, or gallops  ABDOMEN: Soft, Nontender, Nondistended; Bowel sounds present  EXTREMITIES:  2+ Peripheral Pulses, No clubbing, cyanosis, or edema  PSYCH: AAOx3  NEUROLOGY: CN II-XII grossly intact, moving all extremities  SKIN: No rashes or lesions    LABS:                        14.8   6.99  )-----------( 268      ( 27 Aug 2018 08:20 )             46.0         140  |  100  |  13  ----------------------------<  156<H>  3.6   |  27  |  0.93    Ca    9.4      27 Aug 2018 11:00    TPro  Test not performed SPECIMEN GROSSLY HEMOLYZED  /  Alb  3.9  /  TBili  0.5  /  DBili  x   /  AST  72<H>  /  ALT  56<H>  /  AlkPhos  77  08          Urinalysis Basic - ( 27 Aug 2018 10:00 )    Color: COLORLESS / Appearance: CLEAR / S.009 / pH: 7.5  Gluc: NEGATIVE / Ketone: NEGATIVE  / Bili: NEGATIVE / Urobili: NORMAL   Blood: NEGATIVE / Protein: NEGATIVE / Nitrite: NEGATIVE   Leuk Esterase: NEGATIVE / RBC: x / WBC x   Sq Epi: x / Non Sq Epi: x / Bacteria: x          RADIOLOGY & ADDITIONAL TESTS:    ECG Personally Reviewed - NSR, rate 75; RBBB, ?ST depressions in II, V4-V6    Imaging Personally Reviewed: CTH - chronic microvascular ischemic changes, no acute intracranial pathology    Consultant(s) Notes Reviewed:      Care Discussed with Consultants/Other Providers: (27 Aug 2018 16:32)      PAST MEDICAL & SURGICAL HISTORY:  GERD (gastroesophageal reflux disease)  DM (diabetes mellitus)  HTN (hypertension)  Disorder of nasal sinus        MEDICATIONS  (STANDING):  amLODIPine   Tablet 10 milliGRAM(s) Oral daily  atorvastatin 40 milliGRAM(s) Oral at bedtime  dextrose 5%. 1000 milliLiter(s) (50 mL/Hr) IV Continuous <Continuous>  dextrose 50% Injectable 12.5 Gram(s) IV Push once  dextrose 50% Injectable 25 Gram(s) IV Push once  dextrose 50% Injectable 25 Gram(s) IV Push once  donepezil 5 milliGRAM(s) Oral at bedtime  gabapentin 300 milliGRAM(s) Oral two times a day  heparin  Injectable 5000 Unit(s) SubCutaneous every 8 hours  insulin lispro (HumaLOG) corrective regimen sliding scale   SubCutaneous three times a day before meals  lisinopril 20 milliGRAM(s) Oral daily  sodium chloride 0.9%. 1000 milliLiter(s) (70 mL/Hr) IV Continuous <Continuous>      Allergies  penicillin (Unknown)      FAMILY HISTORY:  No pertinent family history  Non-contributary for premature coronary disease or sudden cardiac death    SOCIAL HISTORY:    x] Non-smoker  [ ] Smoker  [ x] Alcohol      REVIEW OF SYSTEMS:  [ ]chest pain  [  ]shortness of breath  [  ]palpitations  [  ]syncope  [ ]near syncope [ ]upper extremity weakness   [ ] lower extremity weakness  [  ]diplopia  [  ]altered mental status [x] headache  [  ]fevers  [ ]chills [ ]nausea  [ ]vomitting  [  ]dysphagia    [ ]abdominal pain  [ ]melena  [ ]BRBPR    [  ]epistaxis  [  ]rash    [ ]lower extremity edema        [x ] All others negative	  [ ] Unable to obtain    PHYSICAL EXAM:  T(C): 37.1 (18 @ 10:06), Max: 37.1 (18 @ 20:43)  HR: 58 (18 @ 10:06) (56 - 80)  BP: 121/62 (18 @ 10:06) (103/54 - 132/60)  RR: 15 (18 @ 10:06) (15 - 18)  SpO2: 96% (18 @ 10:06) (96% - 100%)  Wt(kg): --    Appearance: Normal	  HEENT:   Normal oral mucosa, PERRL, EOMI	  Lymphatic: No lymphadenopathy , no edema  Cardiovascular: Normal S1 S2, No JVD, No murmurs , Peripheral pulses palpable 2+ bilaterally  Respiratory: Lungs clear to auscultation, normal effort 	  Gastrointestinal:  Soft, Non-tender, + BS	  Skin: No rashes, No ecchymoses, No cyanosis, warm to touch  Musculoskeletal: Normal range of motion, normal strength  Psychiatry:  Mood & affect appropriate      TELEMETRY: 	  n/a  ECG:  	NSR RBBB no ischemia    Echo: n/a  NST: n/a  Cath: n/a  	  	  LABS:pending     ASSESSMENT/PLAN: 84 year old female with  T2DM on insulin, essential HTN, presents with c/o head pressure. Patient states she awoke today at 3AM and felt frontal forehead pressure. She states it did not feel like a headache and does not describe it as pain. No vision changes. Light does not bother her, nor does sound. No pulsating quality. No h/o migraine headaches or headaches in general. She states she last felt a head pressure like this about 50 years ago when she gave birth to her daughter. She reports no focal weakness. No urinary or fecal incontinence.  She denies any anginal symptoms, syncope or near syncope.  She has a chronic RBBB    -- EKG unchanged   -- The patient has ruled out for acute coronary syndrome with negative serial cardiac enzymes  -- no need to check TTE at this time as patient with no anginal symptoms or CHF  -- care per primary team
[facility]        Patient is a 84y old  Female who presents with a chief complaint of Head pressure (27 Aug 2018 16:32)     HPI:    84 W PMH T2DM on insulin, essential HTN, presents with c/o head pressure. Patient states she awoke today at 3AM and felt frontal forehead pressure. She states it did not feel like a headache and does not describe it as pain. No vision changes. Light does not bother her, nor does sound. No pulsating quality. No h/o migraine headaches or headaches in general. She states she last felt a head pressure like this about 50 years ago when she gave birth to her daughter. She reports no focal weakness. No urinary or fecal incontinence.     After she developed the head pressure this morning, she attempted to use the bathroom but was too weak to walk back to her bedroom and decided to seek medical attention.     Of note, ED documentation describes complaints of chest heaviness. The patient states her chest is not heavy and she does not have chest pain or shortness of breath. The patient states her daughter  a week ago.    PAST MEDICAL & SURGICAL HISTORY:  GERD (gastroesophageal reflux disease)  DM (diabetes mellitus)  HTN (hypertension)  Disorder of nasal sinus      Review of Systems:   CONSTITUTIONAL: No fever, weight loss, or fatigue  EYES: No eye pain, visual disturbances, or discharge  ENMT:  No difficulty hearing, tinnitus, vertigo; No sinus or throat pain  NECK: No pain or stiffness  BREASTS: No pain, masses, or nipple discharge  RESPIRATORY: No cough, wheezing, chills or hemoptysis; No shortness of breath  CARDIOVASCULAR: No chest pain, palpitations, dizziness, or leg swelling  GASTROINTESTINAL: No abdominal or epigastric pain. No nausea, vomiting, or hematemesis; No diarrhea or constipation. No melena or hematochezia.  GENITOURINARY: No dysuria, frequency, hematuria, or incontinence  NEUROLOGICAL: +head pressure, no memory loss, loss of strength, numbness, or tremors  SKIN: No itching, burning, rashes, or lesions   LYMPH NODES: No enlarged glands  ENDOCRINE: No heat or cold intolerance; No hair loss  MUSCULOSKELETAL: No joint pain or swelling; No muscle, back, or extremity pain  PSYCHIATRIC: No depression, anxiety, mood swings, or difficulty sleeping  HEME/LYMPH: No easy bruising, or bleeding gums  ALLERGY AND IMMUNOLOGIC: No hives or eczema    Allergies    penicillin (Unknown)    Intolerances        Social History:   From [place]. Has lived in the  for the last 28 years. No recent travel. Lives with her daughter. Never smoked tobacco. Reports no alcohol or drug use.    FAMILY HISTORY:  No pertinent family history      MEDICATIONS  (STANDING):  dextrose 5%. 1000 milliLiter(s) (50 mL/Hr) IV Continuous <Continuous>  dextrose 50% Injectable 12.5 Gram(s) IV Push once  dextrose 50% Injectable 25 Gram(s) IV Push once  dextrose 50% Injectable 25 Gram(s) IV Push once  diphenhydrAMINE   Injectable 25 milliGRAM(s) IV Push once  heparin  Injectable 5000 Unit(s) SubCutaneous every 8 hours  insulin lispro (HumaLOG) corrective regimen sliding scale   SubCutaneous three times a day before meals  ketorolac   Injectable 15 milliGRAM(s) IV Push once  metoclopramide Injectable 10 milliGRAM(s) IV Push once    MEDICATIONS  (PRN):  dextrose 40% Gel 15 Gram(s) Oral once PRN Blood Glucose LESS THAN 70 milliGRAM(s)/deciliter  glucagon  Injectable 1 milliGRAM(s) IntraMuscular once PRN Glucose LESS THAN 70 milligrams/deciliter      T(C): 36.8 (18 @ 05:21), Max: 36.8 (18 @ 05:21)  HR: 74 (18 @ 11:45) (69 - 74)  BP: 160/78 (18 @ 11:45) (149/78 - 160/78)  RR: 16 (18 @ 11:45) (16 - 20)  SpO2: 96% (18 @ 11:45) (96% - 100%)    CAPILLARY BLOOD GLUCOSE      POCT Blood Glucose.: 132 mg/dL (27 Aug 2018 13:33)  POCT Blood Glucose.: 151 mg/dL (27 Aug 2018 05:28)    I&O's Summary      PHYSICAL EXAM:  GENERAL: WN/WD woman, lying in stretcher, sad appearing    HEAD:  Atraumatic, Normocephalic, moist oral mucosa  EYES: EOMI, PERRLA, conjunctiva and sclera clear  NECK: Supple, No elevated JVD  CHEST/LUNG: Clear to auscultation bilaterally; No wheeze  HEART: Regular rate and rhythm; No murmurs, rubs, or gallops  ABDOMEN: Soft, Nontender, Nondistended; Bowel sounds present  EXTREMITIES:  2+ Peripheral Pulses, No clubbing, cyanosis, or edema  PSYCH: AAOx3  NEUROLOGY: CN II-XII grossly intact, moving all extremities  SKIN: No rashes or lesions    LABS:                        14.8   6.99  )-----------( 268      ( 27 Aug 2018 08:20 )             46.0         140  |  100  |  13  ----------------------------<  156<H>  3.6   |  27  |  0.93    Ca    9.4      27 Aug 2018 11:00    TPro  Test not performed SPECIMEN GROSSLY HEMOLYZED  /  Alb  3.9  /  TBili  0.5  /  DBili  x   /  AST  72<H>  /  ALT  56<H>  /  AlkPhos  77            Urinalysis Basic - ( 27 Aug 2018 10:00 )    Color: COLORLESS / Appearance: CLEAR / S.009 / pH: 7.5  Gluc: NEGATIVE / Ketone: NEGATIVE  / Bili: NEGATIVE / Urobili: NORMAL   Blood: NEGATIVE / Protein: NEGATIVE / Nitrite: NEGATIVE   Leuk Esterase: NEGATIVE / RBC: x / WBC x   Sq Epi: x / Non Sq Epi: x / Bacteria: x          RADIOLOGY & ADDITIONAL TESTS:    ECG Personally Reviewed - NSR, rate 75; RBBB, ?ST depressions in II, V4-V6    Imaging Personally Reviewed: CTH - chronic microvascular ischemic changes, no acute intracranial pathology    Consultant(s) Notes Reviewed:      Care Discussed with Consultants/Other Providers: (27 Aug 2018 16:32)           *****PAST MEDICAL / Surgical  HISTORY:  PAST MEDICAL & SURGICAL HISTORY:  GERD (gastroesophageal reflux disease)  DM (diabetes mellitus)  HTN (hypertension)  Disorder of nasal sinus           *****FAMILY HISTORY:  FAMILY HISTORY:  No pertinent family history           *****SOCIAL HISTORY:  Alcohol: None  Smoking: None         *****ALLERGIES:   Allergies    penicillin (Unknown)    Intolerances             *****MEDICATIONS: current medication reviewed and documented.   MEDICATIONS  (STANDING):  amLODIPine   Tablet 10 milliGRAM(s) Oral daily  atorvastatin 40 milliGRAM(s) Oral at bedtime  dextrose 5%. 1000 milliLiter(s) (50 mL/Hr) IV Continuous <Continuous>  dextrose 50% Injectable 12.5 Gram(s) IV Push once  dextrose 50% Injectable 25 Gram(s) IV Push once  dextrose 50% Injectable 25 Gram(s) IV Push once  donepezil 5 milliGRAM(s) Oral at bedtime  gabapentin 300 milliGRAM(s) Oral two times a day  heparin  Injectable 5000 Unit(s) SubCutaneous every 8 hours  insulin lispro (HumaLOG) corrective regimen sliding scale   SubCutaneous three times a day before meals  lisinopril 20 milliGRAM(s) Oral daily    MEDICATIONS  (PRN):  acetaminophen   Tablet. 650 milliGRAM(s) Oral every 6 hours PRN mild and moderate pain  dextrose 40% Gel 15 Gram(s) Oral once PRN Blood Glucose LESS THAN 70 milliGRAM(s)/deciliter  glucagon  Injectable 1 milliGRAM(s) IntraMuscular once PRN Glucose LESS THAN 70 milligrams/deciliter  oxyCODONE    IR 5 milliGRAM(s) Oral every 6 hours PRN Severe Pain (7 - 10)           *****REVIEW OF SYSTEM:  GEN: no fever, no chills, no pain  RESP: no SOB, no cough, no sputum  CVS: no chest pain, no palpitations, no edema  GI: no abdominal pain, no nausea, no vomiting, no constipation, no diarrhea  : no dysurea, no frequency, no hematurea  Neuro: no headache, no dizziness  PSYCH: no anxiety, no depression  Derm : no itching, no rash         *****VITAL SIGNS:  T(C): 36.8 (18 @ 10:28), Max: 36.8 (18 @ 10:28)  HR: 73 (18 @ 10:28) (58 - 75)  BP: 145/71 (18 @ 10:28) (124/78 - 159/85)  RR: 18 (18 @ 10:28) (14 - 19)  SpO2: 100% (18 @ 10:28) (98% - 100%)  Wt(kg): --           *****PHYSICAL EXAM:difficult to examine as pt was wailing uncontrollably due to death of her daughter.          *****LAB AND IMAGIN.8   6.99  )-----------( 268      ( 27 Aug 2018 08:20 )             46.0                   140  |  100  |  13  ----------------------------<  156<H>  3.6   |  27  |  0.93    Ca    9.4      27 Aug 2018 11:00    TPro  Test not performed SPECIMEN GROSSLY HEMOLYZED  /  Alb  3.9  /  TBili  0.5  /  DBili  x   /  AST  72<H>  /  ALT  56<H>  /  AlkPhos  77                              Urinalysis Basic - ( 27 Aug 2018 10:00 )    Color: COLORLESS / Appearance: CLEAR / S.009 / pH: 7.5  Gluc: NEGATIVE / Ketone: NEGATIVE  / Bili: NEGATIVE / Urobili: NORMAL   Blood: NEGATIVE / Protein: NEGATIVE / Nitrite: NEGATIVE   Leuk Esterase: NEGATIVE / RBC: x / WBC x   Sq Epi: x / Non Sq Epi: x / Bacteria: x        [All pertinent recent Imaging reports reviewed]         *****A S S E S S M E N T   A N D   P L A N :          84 W PMH T2DM on insulin, essential HTN, presents with c/o head pressure. Patient states she awoke today at 3AM and felt frontal forehead pressure. She states it did not feel like a headache and does not describe it as pain. No vision changes. Light does not bother her, nor does sound. No pulsating quality. No h/o migraine headaches or headaches in general. She states she last felt a head pressure like this about 50 years ago when she gave birth to her daughter. She reports no focal weakness. No urinary or fecal incontinence.     After she developed the head pressure this morning, she attempted to use the bathroom but was too weak to walk back to her bedroom and decided to seek medical attention.       Problem/Recommendations 1: new onset headache,   MRV/MRI brain to r/o acute process.   fluid hydration.  cannot r/o sinusitis vs. venous sinus thrombosis.   would give her depakote/benadryl/reglan if mri is neg.   Problem/Recommendations 2: adjustment disorder,   psych input appreciated.        Differential diagnosis and plan of care discussed with patient after the evaluation.   Advanced care planning options discussed.   Pain assessed and judicious use of narcotics when appropriate was discussed.  Importance of Fall prevention discussed.   80 minutes spent on the total encounter;  more than 50 % of the visit was spent on counseling  and or coordinating care by the attending physician.    Thank you for allowing me to participate in the care of this roxy patient. Please do not hesitate to call me if you have any questions.   ___________________________  Will follow with you.  Thank you,  Sandy Benton MD  Diplomate of the American Board of Neurology and Psychiatry.  Diplomate of the American Board of Vascular Neurology.   [facility]   Ph: [phone]      This and subsequent notes were partially created using voice recognition software and will  inherently be subject to errors including those of syntax and sound alike substitutions which may escape proofreading. In such instances original meaning may be extrapolated by contextual derivation.

## 2018-08-29 NOTE — DISCHARGE NOTE ADULT - HOME CARE AGENCY
[facility]-[phone]. This agency will send a Nurse and Physical Therapist to your home.  [facility]-[phone]. This agency will reinstate your Aide starting 8/30/18.

## 2018-08-29 NOTE — DISCHARGE NOTE ADULT - PLAN OF CARE
stable/improved Your CT head and MRI/MRV head show nothing acute. Your symptoms are likely stress/grief related.  You can follow up with psych at the [facility] walk in clinic [phone] 9a-7p MWF, or call [phone] for regular appts.  Please follow up with your PMD, Dr. Haddad, within one week - Call to schedule appointent stable Continue medications as directed - stopped combo pill; please continue lisinopril and amlodipine as directed Please continue taking all home medications as directed. Consistent carb diet; Please continue taking all home medications as directed.

## 2018-08-29 NOTE — DISCHARGE NOTE ADULT - MEDICATION SUMMARY - MEDICATIONS TO TAKE
I will START or STAY ON the medications listed below when I get home from the hospital:    Rolling Walker Dx: altered gait  -- Apply on skin to affected area once a day   -- Indication: For Dizziness and giddiness    acetaminophen 325 mg oral tablet  -- 2 tab(s) by mouth every 6 hours, As needed, mild and moderate pain  -- Indication: For Pain    lisinopril 20 mg oral tablet  -- 1 tab(s) by mouth once a day  -- Indication: For HTN    gabapentin 300 mg oral capsule  -- 1 cap(s) by mouth 2 times a day  -- Indication: For Type 2 diabetes mellitus without complication, without long-term current use of insulin    Januvia 100 mg oral tablet  -- 1 tab(s) by mouth once a day  -- Indication: For Type 2 diabetes mellitus without complication, without long-term current use of insulin    repaglinide 0.5 mg oral tablet  -- 1 tab(s) by mouth 2 times a day (with meals)  -- Indication: For Type 2 diabetes mellitus without complication, without long-term current use of insulin    atorvastatin 40 mg oral tablet  -- 1 tab(s) by mouth once a day (at bedtime)  -- Indication: For HLD    amLODIPine 10 mg oral tablet  -- 1 tab(s) by mouth once a day  -- Indication: For HTN    donepezil 5 mg oral tablet  -- 1 tab(s) by mouth once a day (at bedtime)  -- Indication: For Prophylactic measure

## 2018-08-29 NOTE — PROGRESS NOTE ADULT - SUBJECTIVE AND OBJECTIVE BOX
[facility]        - Patient seen and examined.  - Today, patient is without complaints. headache is better.          *****MEDICATIONS: Current medication reviewed and documented.    MEDICATIONS  (STANDING):  amLODIPine   Tablet 10 milliGRAM(s) Oral daily  atorvastatin 40 milliGRAM(s) Oral at bedtime  dextrose 5%. 1000 milliLiter(s) (50 mL/Hr) IV Continuous <Continuous>  dextrose 50% Injectable 12.5 Gram(s) IV Push once  dextrose 50% Injectable 25 Gram(s) IV Push once  dextrose 50% Injectable 25 Gram(s) IV Push once  donepezil 5 milliGRAM(s) Oral at bedtime  gabapentin 300 milliGRAM(s) Oral two times a day  heparin  Injectable 5000 Unit(s) SubCutaneous every 8 hours  insulin lispro (HumaLOG) corrective regimen sliding scale   SubCutaneous three times a day before meals  lisinopril 20 milliGRAM(s) Oral daily  sodium chloride 0.9%. 1000 milliLiter(s) (70 mL/Hr) IV Continuous <Continuous>    MEDICATIONS  (PRN):  acetaminophen   Tablet. 650 milliGRAM(s) Oral every 6 hours PRN mild and moderate pain  dextrose 40% Gel 15 Gram(s) Oral once PRN Blood Glucose LESS THAN 70 milliGRAM(s)/deciliter  glucagon  Injectable 1 milliGRAM(s) IntraMuscular once PRN Glucose LESS THAN 70 milligrams/deciliter  oxyCODONE    IR 5 milliGRAM(s) Oral every 6 hours PRN Severe Pain (7 - 10)           ***** REVIEW OF SYSTEM:  GEN: no fever, no chills, no pain  RESP: no SOB, no cough, no sputum  CVS: no chest pain, no palpitations, no edema  GI: no abdominal pain, no nausea, no vomiting, no constipation, no diarrhea  : no dysurea, no frequency  NEURO: no headache, no diziness  PSYCH: no depression, not anxious  Derm : no itching, no rash         ***** VITAL SIGNS:  T(F): 98.7 (08-29-18 @ 10:06), Max: 98.7 (08-28-18 @ 20:43)  HR: 58 (08-29-18 @ 10:06) (56 - 80)  BP: 121/62 (08-29-18 @ 10:06) (103/54 - 132/60)  RR: 15 (08-29-18 @ 10:06) (15 - 18)  SpO2: 96% (08-29-18 @ 10:06) (96% - 100%)  Wt(kg): --  ,   I&O's Summary    28 Aug 2018 07:01  -  29 Aug 2018 07:00  --------------------------------------------------------  IN: 700 mL / OUT: 0 mL / NET: 700 mL             *****PHYSICAL EXAM:   alert oriented x 3 attention comprehension are fair.  Able to name, repeat.   EOmi fundi not visualized   no nystagmus VFF to confrontation  Tongue is midline  Palate elevates symmetrically   Moving all 4 ext spontaneously no drift appreciated    Gait not assessed.        [All pertinent recent Imaging/Reports reviewed]           *****A S S E S S M E N T   A N D   P L A N :  84 W PMH T2DM on insulin, essential HTN, presents with c/o head pressure. Patient states she awoke today at 3AM and felt frontal forehead pressure. She states it did not feel like a headache and does not describe it as pain. No vision changes. Light does not bother her, nor does sound. No pulsating quality. No h/o migraine headaches or headaches in general. She states she last felt a head pressure like this about 50 years ago when she gave birth to her daughter. She reports no focal weakness. No urinary or fecal incontinence.          Problem/Recommendations 1: new onset headache,   MRV/MRI brain no acute process.   fluid hydration.    Problem/Recommendations 2: adjustment disorder,   psych input appreciate        Thank you for allowing me to participate in the care of this patient. Please do not hesitate to call me if you have any  questions.        ________________  Sandy Benton MD  [facility] ([facility]  [phone]     30 minutes spent on total encounter; more than 50 % of the visit was  spent counseling and or  coordinating care by the attending physician.   At the present time, [facility] does not  provide outpatient followup, best to call the your insurance to find a participating provider.  This was explained to you at the time of the visit. Alternatively, if your insurance allows it, you can follow up with a neurologist  Dr. Brina Dunn([place]) [phone] or Dr. Duane Hodge ( [place]) [phone]
Patient is a 84y old  Female who presents with a chief complaint of Head pressure (27 Aug 2018 16:32)      INTERVAL HPI/OVERNIGHT EVENTS:  T(C): 36.9 (18 @ 17:45), Max: 36.9 (18 @ 17:45)  HR: 80 (18 @ 17:45) (58 - 80)  BP: 117/69 (18 @ 17:45) (117/69 - 153/86)  RR: 16 (18 @ 17:45) (14 - 19)  SpO2: 100% (18 @ 17:45) (98% - 100%)  Wt(kg): --  I&O's Summary      LABS:                        14.8   6.99  )-----------( 268      ( 27 Aug 2018 08:20 )             46.0         140  |  100  |  13  ----------------------------<  156<H>  3.6   |  27  |  0.93    Ca    9.4      27 Aug 2018 11:00    TPro  Test not performed SPECIMEN GROSSLY HEMOLYZED  /  Alb  3.9  /  TBili  0.5  /  DBili  x   /  AST  72<H>  /  ALT  56<H>  /  AlkPhos  77        Urinalysis Basic - ( 27 Aug 2018 10:00 )    Color: COLORLESS / Appearance: CLEAR / S.009 / pH: 7.5  Gluc: NEGATIVE / Ketone: NEGATIVE  / Bili: NEGATIVE / Urobili: NORMAL   Blood: NEGATIVE / Protein: NEGATIVE / Nitrite: NEGATIVE   Leuk Esterase: NEGATIVE / RBC: x / WBC x   Sq Epi: x / Non Sq Epi: x / Bacteria: x      CAPILLARY BLOOD GLUCOSE      POCT Blood Glucose.: 144 mg/dL (28 Aug 2018 16:48)  POCT Blood Glucose.: 161 mg/dL (28 Aug 2018 11:55)  POCT Blood Glucose.: 141 mg/dL (28 Aug 2018 07:51)  POCT Blood Glucose.: 145 mg/dL (28 Aug 2018 04:22)        Urinalysis Basic - ( 27 Aug 2018 10:00 )    Color: COLORLESS / Appearance: CLEAR / S.009 / pH: 7.5  Gluc: NEGATIVE / Ketone: NEGATIVE  / Bili: NEGATIVE / Urobili: NORMAL   Blood: NEGATIVE / Protein: NEGATIVE / Nitrite: NEGATIVE   Leuk Esterase: NEGATIVE / RBC: x / WBC x   Sq Epi: x / Non Sq Epi: x / Bacteria: x        MEDICATIONS  (STANDING):  amLODIPine   Tablet 10 milliGRAM(s) Oral daily  atorvastatin 40 milliGRAM(s) Oral at bedtime  dextrose 5%. 1000 milliLiter(s) (50 mL/Hr) IV Continuous <Continuous>  dextrose 50% Injectable 12.5 Gram(s) IV Push once  dextrose 50% Injectable 25 Gram(s) IV Push once  dextrose 50% Injectable 25 Gram(s) IV Push once  donepezil 5 milliGRAM(s) Oral at bedtime  gabapentin 300 milliGRAM(s) Oral two times a day  heparin  Injectable 5000 Unit(s) SubCutaneous every 8 hours  insulin lispro (HumaLOG) corrective regimen sliding scale   SubCutaneous three times a day before meals  lisinopril 20 milliGRAM(s) Oral daily  sodium chloride 0.9%. 1000 milliLiter(s) (70 mL/Hr) IV Continuous <Continuous>    MEDICATIONS  (PRN):  acetaminophen   Tablet. 650 milliGRAM(s) Oral every 6 hours PRN mild and moderate pain  dextrose 40% Gel 15 Gram(s) Oral once PRN Blood Glucose LESS THAN 70 milliGRAM(s)/deciliter  glucagon  Injectable 1 milliGRAM(s) IntraMuscular once PRN Glucose LESS THAN 70 milligrams/deciliter  oxyCODONE    IR 5 milliGRAM(s) Oral every 6 hours PRN Severe Pain (7 - 10)          PHYSICAL EXAM:  GENERAL: NAD, well-groomed, well-developed  HEAD:  Atraumatic, Normocephalic  CHEST/LUNG: Clear to percussion bilaterally; No rales, rhonchi, wheezing, or rubs  HEART: Regular rate and rhythm; No murmurs, rubs, or gallops  ABDOMEN: Soft, Nontender, Nondistended; Bowel sounds present  EXTREMITIES:  2+ Peripheral Pulses, No clubbing, cyanosis, or edema  LYMPH: No lymphadenopathy noted  SKIN: No rashes or lesions    Care Discussed with Consultants/Other Providers [ ] YES  [ ] NO

## 2018-08-29 NOTE — DISCHARGE NOTE ADULT - MEDICATION SUMMARY - MEDICATIONS TO STOP TAKING
I will STOP taking the medications listed below when I get home from the hospital:    Percocet 5/325 oral tablet  -- 1 tab(s) by mouth every 6 hours  Take this for AS NEEDED breakthrough pain. MDD:4   -- Caution federal law prohibits the transfer of this drug to any person other  than the person for whom it was prescribed.  May cause drowsiness.  Alcohol may intensify this effect.  Use care when operating dangerous machinery.  This prescription cannot be refilled.  This product contains acetaminophen.  Do not use  with any other product containing acetaminophen to prevent possible liver damage.  Using more of this medication than prescribed may cause serious breathing problems.    lisinopril-hydroCHLOROthiazide 20 mg-12.5 mg oral tablet  -- 1 tab(s) by mouth once a day

## 2018-08-29 NOTE — DISCHARGE NOTE ADULT - CARE PLAN
Principal Discharge DX:	Adjustment disorder with depressed mood  Goal:	stable/improved  Assessment and plan of treatment:	Your CT head and MRI/MRV head show nothing acute. Your symptoms are likely stress/grief related.  You can follow up with psych at the [facility] walk in clinic [phone] 9a-7p MWF, or call [phone] for regular appts.  Please follow up with your PMD, Dr. Haddad, within one week - Call to schedule appointent  Secondary Diagnosis:	Essential hypertension  Goal:	stable  Assessment and plan of treatment:	Continue medications as directed - stopped combo pill; please continue lisinopril and amlodipine as directed  Secondary Diagnosis:	GERD (gastroesophageal reflux disease)  Goal:	stable  Assessment and plan of treatment:	Please continue taking all home medications as directed.  Secondary Diagnosis:	DM (diabetes mellitus)  Goal:	stable  Assessment and plan of treatment:	Consistent carb diet; Please continue taking all home medications as directed.

## 2018-08-29 NOTE — CONSULT NOTE ADULT - ATTENDING COMMENTS
Agree with above.   no clinical heart failure or anginal symptoms  no further inpatient cv workup needed at this time    Ross Tyler MD

## 2018-08-29 NOTE — DISCHARGE NOTE ADULT - PATIENT PORTAL LINK FT
You can access the [facility] Patient Portal, offered by [facility], by registering with the following website: [email or web address]

## 2018-08-29 NOTE — DISCHARGE NOTE ADULT - HOSPITAL COURSE
84 W PMH T2DM on insulin, essential HTN, presents with c/o head pressure. Patient states she awoke today at 3AM and felt frontal forehead pressure.    Generalized weakness.    - Patient presents with c/o head pressure and generalized weakness. CTH without acute pathology.  - likely sec to stress    Head pain.    - likely stress related  - Evaluated by neuro - CTH without acute findings. Neurologic exam is non-focal.   - c/w home dose APAP.     Abnormal ECG.   - no clinical heart failure or anginal symptoms  - no further inpatient cv workup needed at this time    Type 2 diabetes mellitus without complication, without long-term current use of insulin. Plan: Hold oral hypoglycemics while admitted.  -ISS  -monitor FS glucose.    Chronic bilateral low back pain without sciatica.  - Chronic low back pain without sciatica or neurologic findings. Patient states pain is at baseline. She takes Percocet PRN  - neuro following     Dispo: home with aides

## 2019-05-19 ENCOUNTER — EMERGENCY (EMERGENCY)
Facility: HOSPITAL | Age: 85
LOS: 1 days | Discharge: HOME | End: 2019-05-19
Attending: EMERGENCY MEDICINE | Admitting: EMERGENCY MEDICINE
Payer: MEDICARE

## 2019-05-19 VITALS
TEMPERATURE: 98 F | OXYGEN SATURATION: 100 % | DIASTOLIC BLOOD PRESSURE: 78 MMHG | SYSTOLIC BLOOD PRESSURE: 154 MMHG | HEART RATE: 110 BPM | RESPIRATION RATE: 18 BRPM

## 2019-05-19 VITALS — TEMPERATURE: 98 F

## 2019-05-19 DIAGNOSIS — J34.9 UNSPECIFIED DISORDER OF NOSE AND NASAL SINUSES: Chronic | ICD-10-CM

## 2019-05-19 LAB
ALBUMIN SERPL ELPH-MCNC: 4.3 G/DL — SIGNIFICANT CHANGE UP (ref 3.3–5)
ALP SERPL-CCNC: 105 U/L — SIGNIFICANT CHANGE UP (ref 40–120)
ALT FLD-CCNC: 16 U/L — SIGNIFICANT CHANGE UP (ref 4–33)
ANION GAP SERPL CALC-SCNC: 10 MMO/L — SIGNIFICANT CHANGE UP (ref 7–14)
APPEARANCE UR: CLEAR — SIGNIFICANT CHANGE UP
AST SERPL-CCNC: 19 U/L — SIGNIFICANT CHANGE UP (ref 4–32)
BASOPHILS # BLD AUTO: 0.03 K/UL — SIGNIFICANT CHANGE UP (ref 0–0.2)
BASOPHILS NFR BLD AUTO: 0.5 % — SIGNIFICANT CHANGE UP (ref 0–2)
BILIRUB SERPL-MCNC: 0.2 MG/DL — SIGNIFICANT CHANGE UP (ref 0.2–1.2)
BILIRUB UR-MCNC: NEGATIVE — SIGNIFICANT CHANGE UP
BLOOD UR QL VISUAL: NEGATIVE — SIGNIFICANT CHANGE UP
BUN SERPL-MCNC: 12 MG/DL — SIGNIFICANT CHANGE UP (ref 7–23)
CALCIUM SERPL-MCNC: 9.2 MG/DL — SIGNIFICANT CHANGE UP (ref 8.4–10.5)
CHLORIDE SERPL-SCNC: 106 MMOL/L — SIGNIFICANT CHANGE UP (ref 98–107)
CO2 SERPL-SCNC: 27 MMOL/L — SIGNIFICANT CHANGE UP (ref 22–31)
COLOR SPEC: COLORLESS — SIGNIFICANT CHANGE UP
CREAT SERPL-MCNC: 0.98 MG/DL — SIGNIFICANT CHANGE UP (ref 0.5–1.3)
EOSINOPHIL # BLD AUTO: 0.06 K/UL — SIGNIFICANT CHANGE UP (ref 0–0.5)
EOSINOPHIL NFR BLD AUTO: 1 % — SIGNIFICANT CHANGE UP (ref 0–6)
GLUCOSE SERPL-MCNC: 154 MG/DL — HIGH (ref 70–99)
GLUCOSE UR-MCNC: NEGATIVE — SIGNIFICANT CHANGE UP
HCT VFR BLD CALC: 45.2 % — HIGH (ref 34.5–45)
HGB BLD-MCNC: 14.9 G/DL — SIGNIFICANT CHANGE UP (ref 11.5–15.5)
IMM GRANULOCYTES NFR BLD AUTO: 0.6 % — SIGNIFICANT CHANGE UP (ref 0–1.5)
KETONES UR-MCNC: NEGATIVE — SIGNIFICANT CHANGE UP
LEUKOCYTE ESTERASE UR-ACNC: NEGATIVE — SIGNIFICANT CHANGE UP
LYMPHOCYTES # BLD AUTO: 2.71 K/UL — SIGNIFICANT CHANGE UP (ref 1–3.3)
LYMPHOCYTES # BLD AUTO: 43 % — SIGNIFICANT CHANGE UP (ref 13–44)
MCHC RBC-ENTMCNC: 29.7 PG — SIGNIFICANT CHANGE UP (ref 27–34)
MCHC RBC-ENTMCNC: 33 % — SIGNIFICANT CHANGE UP (ref 32–36)
MCV RBC AUTO: 90.2 FL — SIGNIFICANT CHANGE UP (ref 80–100)
MONOCYTES # BLD AUTO: 0.36 K/UL — SIGNIFICANT CHANGE UP (ref 0–0.9)
MONOCYTES NFR BLD AUTO: 5.7 % — SIGNIFICANT CHANGE UP (ref 2–14)
NEUTROPHILS # BLD AUTO: 3.1 K/UL — SIGNIFICANT CHANGE UP (ref 1.8–7.4)
NEUTROPHILS NFR BLD AUTO: 49.2 % — SIGNIFICANT CHANGE UP (ref 43–77)
NITRITE UR-MCNC: NEGATIVE — SIGNIFICANT CHANGE UP
NRBC # FLD: 0 K/UL — SIGNIFICANT CHANGE UP (ref 0–0)
PH UR: 7 — SIGNIFICANT CHANGE UP (ref 5–8)
PLATELET # BLD AUTO: 254 K/UL — SIGNIFICANT CHANGE UP (ref 150–400)
PMV BLD: 10.4 FL — SIGNIFICANT CHANGE UP (ref 7–13)
POTASSIUM SERPL-MCNC: 3.7 MMOL/L — SIGNIFICANT CHANGE UP (ref 3.5–5.3)
POTASSIUM SERPL-SCNC: 3.7 MMOL/L — SIGNIFICANT CHANGE UP (ref 3.5–5.3)
PROT SERPL-MCNC: 8 G/DL — SIGNIFICANT CHANGE UP (ref 6–8.3)
PROT UR-MCNC: NEGATIVE — SIGNIFICANT CHANGE UP
RBC # BLD: 5.01 M/UL — SIGNIFICANT CHANGE UP (ref 3.8–5.2)
RBC # FLD: 13.8 % — SIGNIFICANT CHANGE UP (ref 10.3–14.5)
SODIUM SERPL-SCNC: 143 MMOL/L — SIGNIFICANT CHANGE UP (ref 135–145)
SP GR SPEC: 1.01 — SIGNIFICANT CHANGE UP (ref 1–1.04)
TROPONIN T, HIGH SENSITIVITY: 11 NG/L — SIGNIFICANT CHANGE UP (ref ?–14)
TROPONIN T, HIGH SENSITIVITY: 11 NG/L — SIGNIFICANT CHANGE UP (ref ?–14)
UROBILINOGEN FLD QL: NORMAL — SIGNIFICANT CHANGE UP
WBC # BLD: 6.3 K/UL — SIGNIFICANT CHANGE UP (ref 3.8–10.5)
WBC # FLD AUTO: 6.3 K/UL — SIGNIFICANT CHANGE UP (ref 3.8–10.5)

## 2019-05-19 PROCEDURE — 99284 EMERGENCY DEPT VISIT MOD MDM: CPT | Mod: 25,GC

## 2019-05-19 PROCEDURE — 93010 ELECTROCARDIOGRAM REPORT: CPT

## 2019-05-19 PROCEDURE — 71045 X-RAY EXAM CHEST 1 VIEW: CPT | Mod: 26

## 2019-05-19 NOTE — ED PROVIDER NOTE - CLINICAL SUMMARY MEDICAL DECISION MAKING FREE TEXT BOX
85F hx htn hld dm p/w chest pain, epistaxis, dysuria. No findings on exam, tachycardiac regular rhythm. Dried blood in L nares, no active bleeding.  Plan: labs, trop, cxr, ua, uc, reassess.

## 2019-05-19 NOTE — ED PROVIDER NOTE - NSFOLLOWUPINSTRUCTIONS_ED_ALL_ED_FT
1) Please follow-up with your primary care doctor within the next 3 days.  If you cannot follow-up with your doctor(s), please return to the ED for any urgent issues.  2) If you have any worsening of symptoms or any other concerns please return to the ED immediately.  3) Please continue taking your home medications as directed.  4) You may have been given a copy of your labs and/or imaging.  Please go over these with your primary care doctor.     You were seen and evaluated in the emergency department for chest pain. Your exam, laboratory findings, EKG, imaging and other assessments were reassuring. We did not find evidence for a dangerous cause of your pain. This does not mean your pain is not real or concerning, only that we could not find a dangerous or life-threatening cause. Please read the attached information sheets as they will provide useful information regarding your condition.    It is important to understand that while your workup was reassuring, no medical workup can completely exclude all concerning conditions. Therefore, we ask that you please return if you develop new or worsening pain, trouble breathing, fainting (or feel that you might faint), weakness, inability to perform your daily activities, or other concerning new symptoms (such as listed on the attached information sheets. Please read the attached information sheets. Take your medication as prescribed.    Please be sure to follow up with your regular doctor in 2-3 days.

## 2019-05-19 NOTE — ED PROVIDER NOTE - ATTENDING CONTRIBUTION TO CARE
DR. BLOCH, ATTENDING MD-  I performed a face to face bedside interview with patient regarding history of present illness, review of symptoms and past medical history. I completed an independent physical exam.  I have discussed patient's plan of care with the resident.   Patient examined, poor historian, memory poor, HEENT nml heart sounds nml lungs clear, heart sounds no mgr, abd soft nontender, ext no rashes or edema .neuro moving all extremities,

## 2019-05-19 NOTE — ED ADULT TRIAGE NOTE - CHIEF COMPLAINT QUOTE
Pt. c/o nosebleed that began this morning, lasted 5 minutes. Also endorses intermittent midsternal chest pain that began "a couple of weeks ago." Denies clots, use of blood thinners, dizziness, weakness.

## 2019-05-19 NOTE — ED ADULT NURSE NOTE - OBJECTIVE STATEMENT
Pt. A&Ox3, c/o substernal chest pain/tightness and weakness x few weeks. States she started having nosebleeds through left nostril since Thursday on and off. No active bleeding at this time. Denies blood thinners. Pt. also states she was just treated for UTI last week with abx but still complaining of dysuria and lower abdominal discomfort. Denies cough, n/v/d or fever/chills. #20g IV placed in right AC, labs sent. MD at bedside for eval. VS done as charted, breathing well on RA. NSR on cardiac monitor. Will continue to monitor.

## 2019-05-19 NOTE — ED PROVIDER NOTE - OBJECTIVE STATEMENT
85F hx htn, hld, dm, RBBB, p/w constellation of symptoms including epistaxis x 2 episodes (once thursday, once this AM - both left nares, both self-resolved, assoc w/ L eye pain), generalized fatigue x 'a few weeks', intermittent 'burning' cp x 'a few weeks'. Of note pt was dx'd with a UTI ;ast week, put on abx (can't recall which), finished course but still endorses intermittent dysuria. Pt denies sick contacts, recent travel, leg swelling, abdominal pain, n/v, f/c. Pt is not on AC.    Pt had stress test in december (normal) and echo in december (normal).

## 2019-05-20 LAB
BACTERIA UR CULT: SIGNIFICANT CHANGE UP
SPECIMEN SOURCE: SIGNIFICANT CHANGE UP

## 2019-07-16 ENCOUNTER — EMERGENCY (EMERGENCY)
Facility: HOSPITAL | Age: 85
LOS: 1 days | Discharge: HOME | End: 2019-07-16
Attending: EMERGENCY MEDICINE | Admitting: EMERGENCY MEDICINE
Payer: MEDICARE

## 2019-07-16 VITALS
SYSTOLIC BLOOD PRESSURE: 151 MMHG | RESPIRATION RATE: 18 BRPM | HEART RATE: 72 BPM | TEMPERATURE: 98 F | DIASTOLIC BLOOD PRESSURE: 63 MMHG | OXYGEN SATURATION: 99 %

## 2019-07-16 VITALS
RESPIRATION RATE: 20 BRPM | HEART RATE: 88 BPM | DIASTOLIC BLOOD PRESSURE: 67 MMHG | OXYGEN SATURATION: 100 % | TEMPERATURE: 98 F | SYSTOLIC BLOOD PRESSURE: 131 MMHG

## 2019-07-16 DIAGNOSIS — J34.9 UNSPECIFIED DISORDER OF NOSE AND NASAL SINUSES: Chronic | ICD-10-CM

## 2019-07-16 LAB
ALBUMIN SERPL ELPH-MCNC: 3.9 G/DL — SIGNIFICANT CHANGE UP (ref 3.3–5)
ALP SERPL-CCNC: 117 U/L — SIGNIFICANT CHANGE UP (ref 40–120)
ALT FLD-CCNC: 12 U/L — SIGNIFICANT CHANGE UP (ref 4–33)
ANION GAP SERPL CALC-SCNC: 12 MMO/L — SIGNIFICANT CHANGE UP (ref 7–14)
APTT BLD: 28.7 SEC — SIGNIFICANT CHANGE UP (ref 27.5–36.3)
AST SERPL-CCNC: 13 U/L — SIGNIFICANT CHANGE UP (ref 4–32)
BASOPHILS # BLD AUTO: 0.03 K/UL — SIGNIFICANT CHANGE UP (ref 0–0.2)
BASOPHILS NFR BLD AUTO: 0.3 % — SIGNIFICANT CHANGE UP (ref 0–2)
BILIRUB SERPL-MCNC: 0.4 MG/DL — SIGNIFICANT CHANGE UP (ref 0.2–1.2)
BUN SERPL-MCNC: 13 MG/DL — SIGNIFICANT CHANGE UP (ref 7–23)
CALCIUM SERPL-MCNC: 9.6 MG/DL — SIGNIFICANT CHANGE UP (ref 8.4–10.5)
CHLORIDE SERPL-SCNC: 102 MMOL/L — SIGNIFICANT CHANGE UP (ref 98–107)
CO2 SERPL-SCNC: 26 MMOL/L — SIGNIFICANT CHANGE UP (ref 22–31)
CREAT SERPL-MCNC: 1 MG/DL — SIGNIFICANT CHANGE UP (ref 0.5–1.3)
EOSINOPHIL # BLD AUTO: 0.15 K/UL — SIGNIFICANT CHANGE UP (ref 0–0.5)
EOSINOPHIL NFR BLD AUTO: 1.3 % — SIGNIFICANT CHANGE UP (ref 0–6)
FLU A RESULT: NOT DETECTED — SIGNIFICANT CHANGE UP
FLU A RESULT: NOT DETECTED — SIGNIFICANT CHANGE UP
FLUAV AG NPH QL: NOT DETECTED — SIGNIFICANT CHANGE UP
FLUBV AG NPH QL: NOT DETECTED — SIGNIFICANT CHANGE UP
GLUCOSE SERPL-MCNC: 184 MG/DL — HIGH (ref 70–99)
HCT VFR BLD CALC: 44.6 % — SIGNIFICANT CHANGE UP (ref 34.5–45)
HGB BLD-MCNC: 14.4 G/DL — SIGNIFICANT CHANGE UP (ref 11.5–15.5)
IMM GRANULOCYTES NFR BLD AUTO: 0.4 % — SIGNIFICANT CHANGE UP (ref 0–1.5)
INR BLD: 1.08 — SIGNIFICANT CHANGE UP (ref 0.88–1.17)
LYMPHOCYTES # BLD AUTO: 2.59 K/UL — SIGNIFICANT CHANGE UP (ref 1–3.3)
LYMPHOCYTES # BLD AUTO: 23.2 % — SIGNIFICANT CHANGE UP (ref 13–44)
MCHC RBC-ENTMCNC: 29.3 PG — SIGNIFICANT CHANGE UP (ref 27–34)
MCHC RBC-ENTMCNC: 32.3 % — SIGNIFICANT CHANGE UP (ref 32–36)
MCV RBC AUTO: 90.7 FL — SIGNIFICANT CHANGE UP (ref 80–100)
MONOCYTES # BLD AUTO: 0.78 K/UL — SIGNIFICANT CHANGE UP (ref 0–0.9)
MONOCYTES NFR BLD AUTO: 7 % — SIGNIFICANT CHANGE UP (ref 2–14)
NEUTROPHILS # BLD AUTO: 7.58 K/UL — HIGH (ref 1.8–7.4)
NEUTROPHILS NFR BLD AUTO: 67.8 % — SIGNIFICANT CHANGE UP (ref 43–77)
NRBC # FLD: 0 K/UL — SIGNIFICANT CHANGE UP (ref 0–0)
PLATELET # BLD AUTO: 268 K/UL — SIGNIFICANT CHANGE UP (ref 150–400)
PMV BLD: 10.3 FL — SIGNIFICANT CHANGE UP (ref 7–13)
POTASSIUM SERPL-MCNC: 3.9 MMOL/L — SIGNIFICANT CHANGE UP (ref 3.5–5.3)
POTASSIUM SERPL-SCNC: 3.9 MMOL/L — SIGNIFICANT CHANGE UP (ref 3.5–5.3)
PROT SERPL-MCNC: 8.1 G/DL — SIGNIFICANT CHANGE UP (ref 6–8.3)
PROTHROM AB SERPL-ACNC: 12.3 SEC — SIGNIFICANT CHANGE UP (ref 9.8–13.1)
RBC # BLD: 4.92 M/UL — SIGNIFICANT CHANGE UP (ref 3.8–5.2)
RBC # FLD: 13.4 % — SIGNIFICANT CHANGE UP (ref 10.3–14.5)
RSV RESULT: SIGNIFICANT CHANGE UP
RSV RNA RESP QL NAA+PROBE: SIGNIFICANT CHANGE UP
SODIUM SERPL-SCNC: 140 MMOL/L — SIGNIFICANT CHANGE UP (ref 135–145)
TROPONIN T, HIGH SENSITIVITY: 8 NG/L — SIGNIFICANT CHANGE UP (ref ?–14)
TROPONIN T, HIGH SENSITIVITY: 8 NG/L — SIGNIFICANT CHANGE UP (ref ?–14)
WBC # BLD: 11.18 K/UL — HIGH (ref 3.8–10.5)
WBC # FLD AUTO: 11.18 K/UL — HIGH (ref 3.8–10.5)

## 2019-07-16 PROCEDURE — 71046 X-RAY EXAM CHEST 2 VIEWS: CPT | Mod: 26

## 2019-07-16 PROCEDURE — 99284 EMERGENCY DEPT VISIT MOD MDM: CPT | Mod: 25

## 2019-07-16 PROCEDURE — 93010 ELECTROCARDIOGRAM REPORT: CPT

## 2019-07-16 RX ORDER — ALBUTEROL 90 UG/1
2.5 AEROSOL, METERED ORAL ONCE
Refills: 0 | Status: COMPLETED | OUTPATIENT
Start: 2019-07-16 | End: 2019-07-16

## 2019-07-16 RX ORDER — SODIUM CHLORIDE 9 MG/ML
500 INJECTION INTRAMUSCULAR; INTRAVENOUS; SUBCUTANEOUS ONCE
Refills: 0 | Status: COMPLETED | OUTPATIENT
Start: 2019-07-16 | End: 2019-07-16

## 2019-07-16 RX ORDER — ACETAMINOPHEN 500 MG
650 TABLET ORAL ONCE
Refills: 0 | Status: COMPLETED | OUTPATIENT
Start: 2019-07-16 | End: 2019-07-16

## 2019-07-16 RX ADMIN — Medication 100 MILLIGRAM(S): at 14:25

## 2019-07-16 RX ADMIN — ALBUTEROL 2.5 MILLIGRAM(S): 90 AEROSOL, METERED ORAL at 14:25

## 2019-07-16 RX ADMIN — Medication 650 MILLIGRAM(S): at 14:02

## 2019-07-16 RX ADMIN — SODIUM CHLORIDE 500 MILLILITER(S): 9 INJECTION INTRAMUSCULAR; INTRAVENOUS; SUBCUTANEOUS at 14:02

## 2019-07-16 NOTE — ED ADULT NURSE NOTE - NSIMPLEMENTINTERV_GEN_ALL_ED
Implemented All Fall Risk Interventions:  [place] to call system. Call bell, personal items and telephone within reach. Instruct patient to call for assistance. Room bathroom lighting operational. Non-slip footwear when patient is off stretcher. Physically safe environment: no spills, clutter or unnecessary equipment. Stretcher in lowest position, wheels locked, appropriate side rails in place. Provide visual cue, wrist band, yellow gown, etc. Monitor gait and stability. Monitor for mental status changes and reorient to person, place, and time. Review medications for side effects contributing to fall risk. Reinforce activity limits and safety measures with patient and family.

## 2019-07-16 NOTE — ED PROVIDER NOTE - NSFOLLOWUPINSTRUCTIONS_ED_ALL_ED_FT
Rest, drink plenty of fluids.  Advance activity as tolerated. Take Tylenol 650mg (Two 325 mg pills) every 4-6 hours as needed for pain. Take Tessalon Perles 100 mg three times a day as needed for cough -- causes drowsiness, no drinking alcohol or driving with this medication.  Follow up with your primary care physician in 48-72 hours- bring copies of your results.  Return to the ER for worsening or persistent symptoms, and/or ANY NEW OR CONCERNING SYMPTOMS. If you have issues obtaining follow up, please call: [phone] to obtain a doctor or specialist who takes your insurance in your area.

## 2019-07-16 NOTE — ED PROVIDER NOTE - CLINICAL SUMMARY MEDICAL DECISION MAKING FREE TEXT BOX
Emilie: Cough for 3 days. no fever, no leg swelling, + chest pain with cough. Headache mild with cough. Will evaluate for chest pain. Lungs clear on exam. +cough.

## 2019-07-16 NOTE — ED ADULT NURSE NOTE - OBJECTIVE STATEMENT
Pt A/Ox4 states she has had a productive cough since Friday. States she noted yellow sputum, denies fever/chills. Pt states she hasn't seen her PMD for this. +SADIA rib pain when pt coughs. Pt breathing even and unlabored, able to speak in full sentences, NAD. Awaiting medical eval at this time.

## 2019-07-16 NOTE — ED PROVIDER NOTE - OBJECTIVE STATEMENT
86 yo F, nonsmoker with PMH of DM, HTN, HLD, RBBB presents to ER c/o productive cough x5 days. Pt states coughing to yellow sputum, no fever, no chills. Reports having intermittent, b/l chest pain under her breast from coughing only, 7/10, nom-radiating. States having frontal headache, only from coughing. Denies any fever, chills, n/v/d, dizziness,  sob, back pain, dysuria, leg swelling, recent travel, sick contact or any other complaints.

## 2019-07-16 NOTE — ED ADULT NURSE REASSESSMENT NOTE - NS ED NURSE REASSESS COMMENT FT1
break coverage RN- pt awake, a/ox3, vitally stable in NAD, duoneb completed, pt appears in NAD, states "I feel much better", has even unlabored respirations, sitting upright for comfort, call bell at bedside, will continue to monitor break coverage RN- pt awake, a/ox3, vitally stable in NAD, nebulizer completed, pt appears in NAD, states "I feel much better", has even unlabored respirations, sitting upright for comfort, call bell at bedside, will continue to monitor

## 2019-07-16 NOTE — ED PROVIDER NOTE - PROGRESS NOTE DETAILS
JOEL: 85y F PMH DM, HTN, HLD productive cough x 5 days, denies fevers. vitals wnl, lungs clear to auscultation bilaterally. Concern for URI, bronchitis, less likely pneumonia. Will obtain cxr, likely dc home with primary care doctor follow-up. PA ANGELIC: CXR neg for pneumonia, Flu neg, trop 8, 8. Discussed with attending, patient can be discharged home to f/u with PCP. The patient was given verbal and written discharge instructions. Specifically, instructions when to return to the ED and when to seek follow-up from their pcp was discussed. Any specialty follow-up was discussed, including how to make an appointment.  Instructions were discussed in simple, plain language and was understood by the patient. The patient understands that should their symptoms worsen or any new symptoms arise, they should return to the ED immediately for further evaluation. All pt's questions were answered. Patient verbalizes understanding.

## 2020-01-30 NOTE — ED ADULT NURSE NOTE - NSFALLRSKPASTHIST_ED_ALL_ED
Anesthesia Evaluation     Patient summary reviewed   history of anesthetic complications: PONV  NPO Solid Status: > 8 hours  NPO Liquid Status: > 8 hours           Airway   Mallampati: II  Neck ROM: full  Dental    (+) poor dentition    Pulmonary - normal exam   (+) asthma,  Cardiovascular - normal exam  Exercise tolerance: good (4-7 METS)    NYHA Classification: II        Neuro/Psych  (+) psychiatric history Anxiety,     GI/Hepatic/Renal/Endo    (+)  GERD poorly controlled,      Musculoskeletal     Abdominal     Abdomen: tender.   Substance History      OB/GYN          Other        (-) arthritis                  Anesthesia Plan    ASA 2     general   Rapid sequence  intravenous induction     Anesthetic plan, all risks, benefits, and alternatives have been provided, discussed and informed consent has been obtained with: patient.       Left message    Patient: Alberto Cabrera    Procedure: Colonoscopy    Sedation: IV    Physician: Any Doctor    Diagnosis: Screening    Pharmacy: Unknown    Diabetic: Yes  Metformin; Glimepiride    Blood Thinners: Yes  ASA 81mg   no

## 2021-02-27 ENCOUNTER — INPATIENT (INPATIENT)
Facility: HOSPITAL | Age: 87
LOS: 3 days | Discharge: HOME | End: 2021-03-03
Attending: INTERNAL MEDICINE | Admitting: INTERNAL MEDICINE
Payer: MEDICARE

## 2021-02-27 VITALS
OXYGEN SATURATION: 100 % | TEMPERATURE: 99 F | HEIGHT: 60 IN | RESPIRATION RATE: 16 BRPM | DIASTOLIC BLOOD PRESSURE: 88 MMHG | SYSTOLIC BLOOD PRESSURE: 180 MMHG | HEART RATE: 99 BPM

## 2021-02-27 DIAGNOSIS — J34.9 UNSPECIFIED DISORDER OF NOSE AND NASAL SINUSES: Chronic | ICD-10-CM

## 2021-02-27 LAB
ALBUMIN SERPL ELPH-MCNC: 4 G/DL — SIGNIFICANT CHANGE UP (ref 3.3–5)
ALP SERPL-CCNC: 94 U/L — SIGNIFICANT CHANGE UP (ref 40–120)
ALT FLD-CCNC: 14 U/L — SIGNIFICANT CHANGE UP (ref 4–33)
ANION GAP SERPL CALC-SCNC: 11 MMOL/L — SIGNIFICANT CHANGE UP (ref 7–14)
AST SERPL-CCNC: 18 U/L — SIGNIFICANT CHANGE UP (ref 4–32)
BASOPHILS # BLD AUTO: 0.03 K/UL — SIGNIFICANT CHANGE UP (ref 0–0.2)
BASOPHILS NFR BLD AUTO: 0.4 % — SIGNIFICANT CHANGE UP (ref 0–2)
BILIRUB SERPL-MCNC: 0.3 MG/DL — SIGNIFICANT CHANGE UP (ref 0.2–1.2)
BUN SERPL-MCNC: 13 MG/DL — SIGNIFICANT CHANGE UP (ref 7–23)
CALCIUM SERPL-MCNC: 9.8 MG/DL — SIGNIFICANT CHANGE UP (ref 8.4–10.5)
CHLORIDE SERPL-SCNC: 103 MMOL/L — SIGNIFICANT CHANGE UP (ref 98–107)
CO2 SERPL-SCNC: 26 MMOL/L — SIGNIFICANT CHANGE UP (ref 22–31)
CREAT SERPL-MCNC: 0.87 MG/DL — SIGNIFICANT CHANGE UP (ref 0.5–1.3)
EOSINOPHIL # BLD AUTO: 0.07 K/UL — SIGNIFICANT CHANGE UP (ref 0–0.5)
EOSINOPHIL NFR BLD AUTO: 1 % — SIGNIFICANT CHANGE UP (ref 0–6)
GLUCOSE SERPL-MCNC: 178 MG/DL — HIGH (ref 70–99)
HCT VFR BLD CALC: 47.6 % — HIGH (ref 34.5–45)
HGB BLD-MCNC: 15.1 G/DL — SIGNIFICANT CHANGE UP (ref 11.5–15.5)
IANC: 3.38 K/UL — SIGNIFICANT CHANGE UP (ref 1.5–8.5)
IMM GRANULOCYTES NFR BLD AUTO: 0.4 % — SIGNIFICANT CHANGE UP (ref 0–1.5)
LYMPHOCYTES # BLD AUTO: 2.93 K/UL — SIGNIFICANT CHANGE UP (ref 1–3.3)
LYMPHOCYTES # BLD AUTO: 42.7 % — SIGNIFICANT CHANGE UP (ref 13–44)
MCHC RBC-ENTMCNC: 29.3 PG — SIGNIFICANT CHANGE UP (ref 27–34)
MCHC RBC-ENTMCNC: 31.7 GM/DL — LOW (ref 32–36)
MCV RBC AUTO: 92.2 FL — SIGNIFICANT CHANGE UP (ref 80–100)
MONOCYTES # BLD AUTO: 0.42 K/UL — SIGNIFICANT CHANGE UP (ref 0–0.9)
MONOCYTES NFR BLD AUTO: 6.1 % — SIGNIFICANT CHANGE UP (ref 2–14)
NEUTROPHILS # BLD AUTO: 3.38 K/UL — SIGNIFICANT CHANGE UP (ref 1.8–7.4)
NEUTROPHILS NFR BLD AUTO: 49.4 % — SIGNIFICANT CHANGE UP (ref 43–77)
NRBC # BLD: 0 /100 WBCS — SIGNIFICANT CHANGE UP
NRBC # FLD: 0 K/UL — SIGNIFICANT CHANGE UP
PLATELET # BLD AUTO: 249 K/UL — SIGNIFICANT CHANGE UP (ref 150–400)
POTASSIUM SERPL-MCNC: 3.8 MMOL/L — SIGNIFICANT CHANGE UP (ref 3.5–5.3)
POTASSIUM SERPL-SCNC: 3.8 MMOL/L — SIGNIFICANT CHANGE UP (ref 3.5–5.3)
PROT SERPL-MCNC: 8.4 G/DL — HIGH (ref 6–8.3)
RBC # BLD: 5.16 M/UL — SIGNIFICANT CHANGE UP (ref 3.8–5.2)
RBC # FLD: 13.5 % — SIGNIFICANT CHANGE UP (ref 10.3–14.5)
SARS-COV-2 RNA SPEC QL NAA+PROBE: SIGNIFICANT CHANGE UP
SODIUM SERPL-SCNC: 140 MMOL/L — SIGNIFICANT CHANGE UP (ref 135–145)
TROPONIN T, HIGH SENSITIVITY RESULT: 10 NG/L — SIGNIFICANT CHANGE UP
TROPONIN T, HIGH SENSITIVITY RESULT: 11 NG/L — SIGNIFICANT CHANGE UP
WBC # BLD: 6.86 K/UL — SIGNIFICANT CHANGE UP (ref 3.8–10.5)
WBC # FLD AUTO: 6.86 K/UL — SIGNIFICANT CHANGE UP (ref 3.8–10.5)

## 2021-02-27 PROCEDURE — 71046 X-RAY EXAM CHEST 2 VIEWS: CPT | Mod: 26

## 2021-02-27 PROCEDURE — 73060 X-RAY EXAM OF HUMERUS: CPT | Mod: 26,RT

## 2021-02-27 PROCEDURE — 72125 CT NECK SPINE W/O DYE: CPT | Mod: 26

## 2021-02-27 PROCEDURE — 70450 CT HEAD/BRAIN W/O DYE: CPT | Mod: 26

## 2021-02-27 PROCEDURE — 73030 X-RAY EXAM OF SHOULDER: CPT | Mod: 26,RT

## 2021-02-27 PROCEDURE — 73030 X-RAY EXAM OF SHOULDER: CPT | Mod: 26,LT,77

## 2021-02-27 RX ORDER — DEXTROSE 50 % IN WATER 50 %
15 SYRINGE (ML) INTRAVENOUS ONCE
Refills: 0 | Status: DISCONTINUED | OUTPATIENT
Start: 2021-02-27 | End: 2021-03-03

## 2021-02-27 RX ORDER — AMLODIPINE BESYLATE 2.5 MG/1
10 TABLET ORAL DAILY
Refills: 0 | Status: DISCONTINUED | OUTPATIENT
Start: 2021-02-27 | End: 2021-03-03

## 2021-02-27 RX ORDER — SODIUM CHLORIDE 9 MG/ML
1000 INJECTION, SOLUTION INTRAVENOUS
Refills: 0 | Status: DISCONTINUED | OUTPATIENT
Start: 2021-02-27 | End: 2021-03-03

## 2021-02-27 RX ORDER — INSULIN LISPRO 100/ML
VIAL (ML) SUBCUTANEOUS
Refills: 0 | Status: DISCONTINUED | OUTPATIENT
Start: 2021-02-27 | End: 2021-03-03

## 2021-02-27 RX ORDER — KETOROLAC TROMETHAMINE 30 MG/ML
15 SYRINGE (ML) INJECTION ONCE
Refills: 0 | Status: DISCONTINUED | OUTPATIENT
Start: 2021-02-27 | End: 2021-02-27

## 2021-02-27 RX ORDER — DEXTROSE 50 % IN WATER 50 %
25 SYRINGE (ML) INTRAVENOUS ONCE
Refills: 0 | Status: DISCONTINUED | OUTPATIENT
Start: 2021-02-27 | End: 2021-03-03

## 2021-02-27 RX ORDER — ACETAMINOPHEN 500 MG
975 TABLET ORAL EVERY 6 HOURS
Refills: 0 | Status: DISCONTINUED | OUTPATIENT
Start: 2021-02-27 | End: 2021-03-01

## 2021-02-27 RX ORDER — LISINOPRIL 2.5 MG/1
20 TABLET ORAL DAILY
Refills: 0 | Status: DISCONTINUED | OUTPATIENT
Start: 2021-02-27 | End: 2021-03-03

## 2021-02-27 RX ORDER — INSULIN LISPRO 100/ML
VIAL (ML) SUBCUTANEOUS AT BEDTIME
Refills: 0 | Status: DISCONTINUED | OUTPATIENT
Start: 2021-02-27 | End: 2021-03-03

## 2021-02-27 RX ORDER — GLUCAGON INJECTION, SOLUTION 0.5 MG/.1ML
1 INJECTION, SOLUTION SUBCUTANEOUS ONCE
Refills: 0 | Status: DISCONTINUED | OUTPATIENT
Start: 2021-02-27 | End: 2021-03-03

## 2021-02-27 RX ORDER — DEXTROSE 50 % IN WATER 50 %
12.5 SYRINGE (ML) INTRAVENOUS ONCE
Refills: 0 | Status: DISCONTINUED | OUTPATIENT
Start: 2021-02-27 | End: 2021-03-03

## 2021-02-27 RX ADMIN — Medication 15 MILLIGRAM(S): at 21:47

## 2021-02-27 RX ADMIN — Medication 975 MILLIGRAM(S): at 18:50

## 2021-02-27 NOTE — ED ADULT NURSE NOTE - CHIEF COMPLAINT QUOTE
Pt presents to ED for dizziness since last night. Pt states she fell down yesterday when attempting to get into bed and hit her head. Denies LOC. Pt states "I just feel tired and feel pressure in my chest." Denies headache, chest pain, palpitations, fevers, chills. Pmhx of DM and HTN.   Casandra Ponce (Granddaughter): [phone]

## 2021-02-27 NOTE — ED PROVIDER NOTE - ATTENDING CONTRIBUTION TO CARE
87 yo female for evaluation s/p unwitnessed fall, syncope. Reports she cannot recall how she felt. Denies chest pain, shortness of breath. Denies sensorimotor deficits.   Gen: no acute distress, well appearing, awake, alert and oriented x 3  Cardiac: regular rate and rhythm, +S1S2  Pulm: Clear to auscultation bilaterally  Abd: soft, nontender, nondistended, no guarding  Back: neg CVA ttp, nontender spine  Extremity: no edema, no deformity, warm and well perfused, FROM all extremities    Neuro: awake, alert, oriented x 3, sensorimotor intact  A/P syncope, r/o acs - labs, ekg, imaging, reassess

## 2021-02-27 NOTE — ED CDU PROVIDER INITIAL DAY NOTE - OBJECTIVE STATEMENT
87 y/o Female with PMHx of DM and HTN presents with dizziness and chest heaviness after unwitnessed fall last night. States she doesn't recall how she fell, was sitting on her bed and found herself on the floor few minutes later. After fall she admits to blurred vision for few seconds. Chest heaviness post fall, and dizziness till this morning. Describes dizziness as room spinning.  Hit the left side of her head, and has pain in her head, neck, and shoulder (left side). States this is the first episode. Denies fever, urinary incontinence after fall, dizziness or chest pain right now.    CDU PA : Agree with above.  Pt with dizziness, s/p fall ?syncope associated with chest pain.  Pt has not had any cardiac work up recently.  Endorses mild headache at present and right shoulder pain that is worse with movement.  In ED EKG with no acute ischemic changes.  Currently denies any CP, sob, abd pain.  Sent to CDU for echo, stress, telemetry.

## 2021-02-27 NOTE — ED CDU PROVIDER INITIAL DAY NOTE - MEDICAL DECISION MAKING DETAILS
85 y/o Female with PMHx of DM and HTN with dizziness, s/p fall ?syncope associated with chest pain.  Pt has not had any cardiac work up recently.  Endorses mild headache at present and right shoulder pain that is worse with movement.  In ED EKG with no acute ischemic changes.  Currently denies any CP, sob, abd pain.  Sent to CDU for echo, stress, telemetry.  Pt now c/o right shoulder pain worsening with movement.

## 2021-02-27 NOTE — ED PROVIDER NOTE - OBJECTIVE STATEMENT
87 y/o Female with PMHx of DM and HTN presents with dizziness and chest heaviness after unwitnessed fall last night. States she doesn't recall how she fell, was sitting on her bed and found herself on the floor few minutes later. After fall she admits to blurred vision for few seconds. Chest heaviness post fall, and dizziness till this morning. Describes dizziness as room spinning.  Hit the left side of her head, and has pain in her head, neck, and shoulder (left side). States this is the first episode.     Denies fever, urinary incontinence after fall, dizziness or chest pain right now.

## 2021-02-27 NOTE — ED ADULT NURSE NOTE - OBJECTIVE STATEMENT
pt brought to rm 15, A&ox3, skin w/d/i, amb w/ cane @ baseline, c/o dizziness associated w/ chest heaviness s/p unwitnessed fall last night, pt states "I was in bed and then I woke up on the floor," states upon "waking" had blurred vision which has since resolved, denies chest heaviness pre episode, states hit L side head, c/o pain in head, neck, and L shoulder, denies previous episodes of similar, denies past cardiac history, no neuro deficits noted, placed on CM for monitoring, SL placed, labs sent, HOLLIE Morley @ bedside for eval, oriented to call bell, side rails up for safety, safety maintained.

## 2021-02-27 NOTE — ED CDU PROVIDER INITIAL DAY NOTE - PROGRESS NOTE DETAILS
Patient received at sign out. Presented to ED c/p ?fall vs syncope with cp/dizziness and now Rt arm pain. Denies falling on Rt arm. +TTP noted over humeral and shoulder region, +difficulty with ROM 2/2 to pain. Shoulder Xray was normal but will add on Xray of Humerus. Pt pending stress and echo in AM. Will monitor closely.

## 2021-02-27 NOTE — ED CDU PROVIDER INITIAL DAY NOTE - ATTENDING CONTRIBUTION TO CARE
87 yo female for evaluation s/p unwitnessed fall, syncope. Reports she cannot recall how she felt. Denies chest pain, shortness of breath. Denies sensorimotor deficits.   Gen: no acute distress, well appearing, awake, alert and oriented x 3  Cardiac: regular rate and rhythm, +S1S2  Pulm: Clear to auscultation bilaterally  Abd: soft, nontender, nondistended, no guarding  Back: neg CVA ttp, nontender spine  Extremity: no edema, no deformity, warm and well perfused, FROM all extremities    Neuro: awake, alert, oriented x 3, sensorimotor intact  A/P syncope - continue telemetry monitoring, echo& stress, acrdiology

## 2021-02-27 NOTE — ED PROVIDER NOTE - CLINICAL SUMMARY MEDICAL DECISION MAKING FREE TEXT BOX
87 y/o Female presents with unwitnessed fall last night. Chest heaviness post fall, and dizziness till this morning.   -unclear etiology of fall/loc, possible syncope, r/o intracranial patholgy (cva bleed etc, r/o acs, r/o fx  -cbc, cmp, glucose, troponin, ct cervical, ct head, xray chest, xray shoulder

## 2021-02-27 NOTE — ED PROVIDER NOTE - PHYSICAL EXAMINATION
Vital signs reviewed.   CONSTITUTIONAL: Well-appearing; well-nourished; in no apparent distress. Non-toxic appearing.   HEAD: no visible signs of trauma   EYES: PERRL, EOM intact, conjunctiva and no sclera injection noted  ENT: normal nose; no rhinorrhea; normal pharynx with no tonsillar hypertrophy, no erythema, no exudate, no lymphadenopathy.  NECK/LYMPH: pain on palpation of neck to shoulder   CARD: Normal S1, S2  RESP: Normal chest excursion with respiration; breath sounds clear and equal bilaterally; no wheezes, rhonchi, or rales.  ABD/GI: soft, non-distended; non-tender; no palpable organomegaly, no pulsatile mass.  EXT/MS: moves all extremities; distal pulses are normal, no pedal edema.  SKIN: Normal for age and race; warm; dry; good turgor; no apparent lesions or exudate noted.  NEURO: Awake, alert, oriented x 3, no gross deficits, CN II-XII grossly intact, no motor or sensory deficit noted.

## 2021-02-28 DIAGNOSIS — I10 ESSENTIAL (PRIMARY) HYPERTENSION: ICD-10-CM

## 2021-02-28 DIAGNOSIS — R55 SYNCOPE AND COLLAPSE: ICD-10-CM

## 2021-02-28 DIAGNOSIS — F03.90 UNSPECIFIED DEMENTIA WITHOUT BEHAVIORAL DISTURBANCE: ICD-10-CM

## 2021-02-28 DIAGNOSIS — Z29.9 ENCOUNTER FOR PROPHYLACTIC MEASURES, UNSPECIFIED: ICD-10-CM

## 2021-02-28 DIAGNOSIS — E78.5 HYPERLIPIDEMIA, UNSPECIFIED: ICD-10-CM

## 2021-02-28 DIAGNOSIS — N39.0 URINARY TRACT INFECTION, SITE NOT SPECIFIED: ICD-10-CM

## 2021-02-28 DIAGNOSIS — E11.9 TYPE 2 DIABETES MELLITUS WITHOUT COMPLICATIONS: ICD-10-CM

## 2021-02-28 LAB
APPEARANCE UR: ABNORMAL
BILIRUB UR-MCNC: NEGATIVE — SIGNIFICANT CHANGE UP
COLOR SPEC: YELLOW — SIGNIFICANT CHANGE UP
DIFF PNL FLD: NEGATIVE — SIGNIFICANT CHANGE UP
GLUCOSE UR QL: NEGATIVE — SIGNIFICANT CHANGE UP
KETONES UR-MCNC: NEGATIVE — SIGNIFICANT CHANGE UP
LEUKOCYTE ESTERASE UR-ACNC: ABNORMAL
NITRITE UR-MCNC: POSITIVE
PH UR: 5.5 — SIGNIFICANT CHANGE UP (ref 5–8)
PROT UR-MCNC: ABNORMAL
SP GR SPEC: 1.02 — SIGNIFICANT CHANGE UP (ref 1.01–1.02)
UROBILINOGEN FLD QL: SIGNIFICANT CHANGE UP

## 2021-02-28 PROCEDURE — 93018 CV STRESS TEST I&R ONLY: CPT | Mod: GC

## 2021-02-28 PROCEDURE — 93016 CV STRESS TEST SUPVJ ONLY: CPT | Mod: GC

## 2021-02-28 PROCEDURE — 99223 1ST HOSP IP/OBS HIGH 75: CPT

## 2021-02-28 PROCEDURE — 78452 HT MUSCLE IMAGE SPECT MULT: CPT | Mod: 26

## 2021-02-28 PROCEDURE — 93306 TTE W/DOPPLER COMPLETE: CPT | Mod: 26

## 2021-02-28 RX ORDER — ONDANSETRON 8 MG/1
4 TABLET, FILM COATED ORAL ONCE
Refills: 0 | Status: COMPLETED | OUTPATIENT
Start: 2021-02-28 | End: 2021-02-28

## 2021-02-28 RX ORDER — SODIUM CHLORIDE 9 MG/ML
1000 INJECTION INTRAMUSCULAR; INTRAVENOUS; SUBCUTANEOUS
Refills: 0 | Status: DISCONTINUED | OUTPATIENT
Start: 2021-02-28 | End: 2021-03-03

## 2021-02-28 RX ORDER — DONEPEZIL HYDROCHLORIDE 10 MG/1
5 TABLET, FILM COATED ORAL AT BEDTIME
Refills: 0 | Status: DISCONTINUED | OUTPATIENT
Start: 2021-02-28 | End: 2021-03-03

## 2021-02-28 RX ORDER — ATORVASTATIN CALCIUM 80 MG/1
40 TABLET, FILM COATED ORAL AT BEDTIME
Refills: 0 | Status: DISCONTINUED | OUTPATIENT
Start: 2021-02-28 | End: 2021-03-03

## 2021-02-28 RX ORDER — ENOXAPARIN SODIUM 100 MG/ML
40 INJECTION SUBCUTANEOUS DAILY
Refills: 0 | Status: DISCONTINUED | OUTPATIENT
Start: 2021-02-28 | End: 2021-03-03

## 2021-02-28 RX ADMIN — Medication 1: at 13:06

## 2021-02-28 RX ADMIN — LISINOPRIL 20 MILLIGRAM(S): 2.5 TABLET ORAL at 05:45

## 2021-02-28 RX ADMIN — ATORVASTATIN CALCIUM 40 MILLIGRAM(S): 80 TABLET, FILM COATED ORAL at 22:03

## 2021-02-28 RX ADMIN — Medication 975 MILLIGRAM(S): at 04:58

## 2021-02-28 RX ADMIN — SODIUM CHLORIDE 100 MILLILITER(S): 9 INJECTION INTRAMUSCULAR; INTRAVENOUS; SUBCUTANEOUS at 22:08

## 2021-02-28 RX ADMIN — ONDANSETRON 4 MILLIGRAM(S): 8 TABLET, FILM COATED ORAL at 04:58

## 2021-02-28 RX ADMIN — AMLODIPINE BESYLATE 10 MILLIGRAM(S): 2.5 TABLET ORAL at 05:45

## 2021-02-28 RX ADMIN — Medication 1: at 09:46

## 2021-02-28 RX ADMIN — DONEPEZIL HYDROCHLORIDE 5 MILLIGRAM(S): 10 TABLET, FILM COATED ORAL at 22:03

## 2021-02-28 NOTE — ED CDU PROVIDER SUBSEQUENT DAY NOTE - HISTORY
Patient is a 87 y/o Female with PMHx of DM and HTN  who presented to ED c/o dizziness and chest heaviness s/p fall. Denies loc. Pt seen and worked up in ED; Trop 10, 11, ECG rbbb, cxr normal. Xrays and CT H/N are wnl besides incidental ?thyroid mass.. Pt transferred to CDU for; Stress, echo, tele, vitals q4 and frequent re-evals.     While in CDU patient now complaining of suprapubic pain and nausea with dysuria. Will get UA/UC. Pt otherwise stable. No acute events overnight. Vitals are stable. Denying cp/sob at this time. Pt pending stress/echo this AM.

## 2021-02-28 NOTE — H&P ADULT - NSICDXPASTMEDICALHX_GEN_ALL_CORE_FT
PAST MEDICAL HISTORY:  DM (diabetes mellitus)     GERD (gastroesophageal reflux disease)     HTN (hypertension)

## 2021-02-28 NOTE — H&P ADULT - NSHPREVIEWOFSYSTEMS_GEN_ALL_CORE
CONSTITUTIONAL: No fevers or chills  EYES/ENT: No vision changes or eye pain  NECK: No neck pain or stiffness.  RESPIRATORY: No shortness of breath. No cough, wheezing.  CARDIOVASCULAR: No chest pain or palpitations  GASTROINTESTINAL: No abdominal pain. No nausea, vomiting; No diarrhea or constipation.  GENITOURINARY: +dysuria, -hematuria  NEUROLOGICAL: No numbness or weakness  ENDO: No polyuria or polydipsia.   SKIN: No itching, rashes.    All other review of systems is negative unless indicated above.

## 2021-02-28 NOTE — ED ADULT NURSE REASSESSMENT NOTE - NS ED NURSE REASSESS COMMENT FT1
patient aaox3. ambulatory w/o assist w/ cane. came in with syncope and fall. patient does not recall incident. head strike to left side. no edema or change in skin color. patient denies dizziness, weakness, lightheadedness, numbness, tingling. patient on cardiac monitor nsr. found to have a uti. currently getting antibiotics. ns at 100ml/hr to right ac #20g. patient is a diabetic. checks her fingerstick regularly. appears comfortable. breathing comfortably on room air. Will continue to monitor

## 2021-02-28 NOTE — ED CDU PROVIDER SUBSEQUENT DAY NOTE - PROGRESS NOTE DETAILS
Pt signed out to me pending stress/echo and UA as pt reported suprapubic pain and dysuria this morning. UA positive, spoke with pharmacy will treat with Levaquin given penicillin allergy (reaction unknown) Stress test resulted normal, echo with hyperdynamic LV, spoke with tele doc , pt is ok for outpt follow up. Given positive UA and pt complaints of generalized weakness will observe in CDU additional night Pt with run of atrial corinne, spoke with , planned to admit pt for UTI, can admit to his service, text paged MAR Pt signed out to me pending stress/echo and UA as pt reported suprapubic pain and dysuria this morning. UA positive, spoke with pharmacy will treat with Levaquin given penicillin allergy (reaction unknown). levaquin 500 today followed by 250mg for 4 more days Stress test resulted normal, echo with hyperdynamic LV, spoke with tele doc . Given positive UA and pt complaints of generalized weakness will observe in CDU additional night Pt with a run of atrial corinne, spoke with , planned to admit pt for UTI, can admit to his service, text paged MAR

## 2021-02-28 NOTE — H&P ADULT - ASSESSMENT
Ms Trang Barnett is a 86 year old F with PMH significant for but not limited to HTN, DM2, HDL, dementia, who is presenting to the hospital after fall with UTI.

## 2021-02-28 NOTE — ED CDU PROVIDER SUBSEQUENT DAY NOTE - ATTENDING CONTRIBUTION TO CARE
I performed a face to face evaluation of this patient and obtained a history and performed a full exam.  I agree with the history, physical exam and plan of the PA.  Patient is a 87 y/o Female with PMHx of DM and HTN  who presented to ED c/o dizziness and chest heaviness s/p fall. Denies loc. Pt seen and worked up in ED; Trop 10, 11, ECG rbbb, cxr normal. Xrays and CT H/N are wnl besides incidental ?thyroid mass.. Pt transferred to CDU for; Stress, echo, tele, vitals q4 and frequent re-evals.     While in CDU patient now complaining of suprapubic pain and nausea with dysuria. Found to have a UTI, meds given, will consider admission.  Pt with mild ttp, no rebound, no cvat, heart and lung wnl neuro wnl.

## 2021-02-28 NOTE — H&P ADULT - PROBLEM SELECTOR PLAN 1
patient has significant dysuria and suprapubic discomfort   U/a suggestive of infection  given levofloxacin in CDU, can continue for total of 3 days

## 2021-02-28 NOTE — ED CDU PROVIDER SUBSEQUENT DAY NOTE - PHYSICAL EXAMINATION
Vital signs reviewed.   CONSTITUTIONAL: Well-appearing; well-nourished; in no apparent distress. Non-toxic appearing.   HEAD: Normocephalic, atraumatic.  EYES: PERRL, EOM intact, conjunctiva and sclera WNL.  ENT: normal nose; no rhinorrhea  CARD: Normal S1, S2; no murmurs, rubs, or gallops noted.  RESP: Normal chest excursion with respiration; breath sounds clear and equal bilaterally; no wheezes, rhonchi, or rales.  ABD/GI: soft, non-distended; minimally tender suprapubic.   EXT/MS: moves all extremities;   SKIN: Normal for age and race;   NEURO: Awake, alert, oriented x 3, no gross deficits

## 2021-02-28 NOTE — ED CDU PROVIDER DISPOSITION NOTE - CLINICAL COURSE
86yF w/pmhx DM, HTN presenting with dizziness, chest heaviness and fall (unwitnessed). Pt reports she was sitting on her bed and then found herself on the floor. ED workup revealed negative trop, EKG without ischemic changes, CT head/neck without fracture or bleed, concern for possible thyroid lesion? Pt was sent to CDU for stress test and echo. This morning she complained of dysuria, found to have UTI and started on antibiotics. Stress test normal, echo with hyperdynamic LV. On tele pt with short run of atrial bigeminy. Spoke with tele doc , admitted for further cardiac evaluation and UTI.

## 2021-02-28 NOTE — ED CDU PROVIDER SUBSEQUENT DAY NOTE - MEDICAL DECISION MAKING DETAILS
Transferred per cart to cac 21   Patient is a 85 y/o Female with PMHx of DM and HTN  who presented to ED c/o dizziness and chest heaviness s/p fall. Denies loc. Pt seen and worked up in ED; Trop 10, 11, ECG rbbb, cxr normal. Xrays and CT H/N are wnl besides incidental ?thyroid mass. Pt transferred to CDU for; Stress, echo, tele, vitals q4 and frequent re-evals.     While in CDU patient now complaining of suprapubic pain and nausea with dysuria. Will get UA/UC. Pt otherwise stable. No acute events overnight. Vitals are stable. Denying cp/sob at this time. Pt pending stress/echo this AM.     Pt CT scan showed ?thyroid mass or lymph node. Pt states she was not aware. Aware she needs to f/u with PCP/ENT for further evaluation.

## 2021-02-28 NOTE — H&P ADULT - HISTORY OF PRESENT ILLNESS
Ms Trang Barnett is a 86 year old F with PMH significant for but not limited to HTN, DM2, HDL, dementia, who is presenting to the hospital after fall. Patient states that she has been having suprapubic discomfort and dysuria for 4 days, but did not think much of it. Yesterday states that she was lowering herself into bed and lost consciousness and the next thing that she remembers was that she was on the floor. She did report some right upper chest/ shoulder pain after the fall.  She states that pain was aching sensation, non-radiating, moderate in intensity and was worsened which movement of the right arm.

## 2021-02-28 NOTE — H&P ADULT - NSHPPHYSICALEXAM_GEN_ALL_CORE
PHYSICAL EXAM:  Vital Signs Last 24 Hrs  T(C): 36.8 (02-28-21 @ 15:51)  T(F): 98.2 (02-28-21 @ 15:51), Max: 98.7 (02-28-21 @ 05:45)  HR: 75 (02-28-21 @ 15:51) (73 - 98)  BP: 141/85 (02-28-21 @ 16:24)  BP(mean): --  RR: 18 (02-28-21 @ 15:51) (16 - 18)  SpO2: 98% (02-28-21 @ 15:51) (98% - 100%)  Wt(kg): --    Constitutional: NAD, awake and alert  EYES: PERRLA, normal sclera  HEENT:  NCAT, nares patent, moist oral mucosa  Neck: Soft and supple , no masses, no JVD  Respiratory: no respiratory distress, Breath sounds are clear bilaterally. No wheezing, rales or rhonchi  Cardiovascular: S1 and S2, regular rate, no murmurs. peripheral pulses palpable x4  Gastrointestinal: Soft, nontender, nondistended. +BS  Extremities: No lower extremity edema, no calf tenderness, warm to touch  Neurological: CN II-XII intact, LE sensation intact, 5/5 strength of bilateral LEs   Musculoskeletal: Normal ROM of RT shoulder, no joint swelling.  Skin: No rashes, No erythema   Psych: Alert and Oriented x3, normal mood, normal affect

## 2021-02-28 NOTE — ED CDU PROVIDER DISPOSITION NOTE - ATTENDING CONTRIBUTION TO CARE
I performed a face to face evaluation of this patient and obtained a history and performed a full exam.  I agree with the history, physical exam and plan of the PA.  86yF w/pmhx DM, HTN presenting with dizziness, chest heaviness and fall (unwitnessed). Pt reports she was sitting on her bed and then found herself on the floor. ED workup revealed negative trop, EKG without ischemic changes, CT head/neck without fracture or bleed, concern for possible thyroid lesion? Pt was sent to CDU for stress test and echo. This morning she complained of dysuria, found to have UTI and started on antibiotics. Stress test normal, echo with hyperdynamic LV. On tele pt with short run of atrial bigeminy. Spoke with tele doc , admitted for further cardiac evaluation and UTI.  Pt in nad, awake, ambulatory, no cvat, belly soft nontender, during most of stay s1,s2 rrr no m,r,g. Normal neuro exam.

## 2021-02-28 NOTE — H&P ADULT - PROBLEM SELECTOR PLAN 2
denies preceding symptoms, but states had chest pain subsequently   EKG without ST changes, trops 10->11  CXR with clear lungs  Echo showed LV thickening, mild diastolic dysfunction and hyperdynamic LV  normal nuclear stress  PT consulted

## 2021-02-28 NOTE — H&P ADULT - NSICDXFAMILYHX_GEN_ALL_CORE_FT
FAMILY HISTORY:  Mother  Still living? Unknown  Family history of heart disease, Age at diagnosis: Age Unknown

## 2021-02-28 NOTE — H&P ADULT - PROBLEM SELECTOR PLAN 6
Patient:   MAXIMO REINA            MRN: [id number]            FIN: [id number]              Age:   66 years     Sex:  MALE     :  54   Associated Diagnoses:   None   Author:   GRISELDA HOUSTON ED Note:   qing  
c/w donepazil

## 2021-03-01 DIAGNOSIS — E04.1 NONTOXIC SINGLE THYROID NODULE: ICD-10-CM

## 2021-03-01 DIAGNOSIS — R07.9 CHEST PAIN, UNSPECIFIED: ICD-10-CM

## 2021-03-01 PROCEDURE — 93880 EXTRACRANIAL BILAT STUDY: CPT | Mod: 26

## 2021-03-01 PROCEDURE — 76536 US EXAM OF HEAD AND NECK: CPT | Mod: 26

## 2021-03-01 RX ORDER — ACETAMINOPHEN 500 MG
975 TABLET ORAL EVERY 6 HOURS
Refills: 0 | Status: DISCONTINUED | OUTPATIENT
Start: 2021-03-01 | End: 2021-03-03

## 2021-03-01 RX ADMIN — DONEPEZIL HYDROCHLORIDE 5 MILLIGRAM(S): 10 TABLET, FILM COATED ORAL at 21:59

## 2021-03-01 RX ADMIN — Medication 975 MILLIGRAM(S): at 21:59

## 2021-03-01 RX ADMIN — LISINOPRIL 20 MILLIGRAM(S): 2.5 TABLET ORAL at 05:48

## 2021-03-01 RX ADMIN — ATORVASTATIN CALCIUM 40 MILLIGRAM(S): 80 TABLET, FILM COATED ORAL at 21:59

## 2021-03-01 RX ADMIN — Medication 1: at 11:52

## 2021-03-01 RX ADMIN — AMLODIPINE BESYLATE 10 MILLIGRAM(S): 2.5 TABLET ORAL at 05:48

## 2021-03-01 RX ADMIN — ENOXAPARIN SODIUM 40 MILLIGRAM(S): 100 INJECTION SUBCUTANEOUS at 11:56

## 2021-03-01 NOTE — DISCHARGE NOTE PROVIDER - PROVIDER TOKENS
PROVIDER:[TOKEN:[[id number]]] PROVIDER:[TOKEN:[[id number]]],PROVIDER:[TOKEN:[[id number]]]

## 2021-03-01 NOTE — DISCHARGE NOTE PROVIDER - NSFOLLOWUPCLINICS_GEN_ALL_ED_FT
[facility] [place] [facility]  Internal Medicine  [address] [place] [address]  Phone: [phone]  Fax: [phone]    [facility]  Cardiology  [address] [place] [id number]  Phone: [phone]  Fax:   Follow Up Time:

## 2021-03-01 NOTE — DISCHARGE NOTE PROVIDER - CARE PROVIDERS DIRECT ADDRESSES
,DirectAddress_Unknown ,DirectAddress_Unknown,[email or web address]

## 2021-03-01 NOTE — CONSULT NOTE ADULT - SUBJECTIVE AND OBJECTIVE BOX
Cardiovascular Disease Initial Evaluation    CHIEF COMPLAINT: Passing out    HISTORY OF PRESENT ILLNESS:  This is an 86 year old woman with HTN, NIDDM, HLD and dementia who presented to [facility] on 2/27/2021 after fall. Patient states that she has been having suprapubic discomfort and dysuria for 4 days, but did not think much of it. She was lowering herself into bed and lost consciousness and the next thing that she remembers was that she was on the floor. She did report some right upper chest/ shoulder pain after the fall.    Ms. Barnett was admitted for syncope and UTI.  Cardiology is consulted for atrial bigeminy and syncope.  Currently she denies chest pain or SOB.     Allergies  penicillin (Unknown)      MEDICATIONS:  amLODIPine   Tablet 10 milliGRAM(s) Oral daily  enoxaparin Injectable 40 milliGRAM(s) SubCutaneous daily  lisinopril 20 milliGRAM(s) Oral daily  levoFLOXacin  Tablet 250 milliGRAM(s) Oral every 24 hours      acetaminophen   Tablet .. 975 milliGRAM(s) Oral every 6 hours PRN  donepezil 5 milliGRAM(s) Oral at bedtime      atorvastatin 40 milliGRAM(s) Oral at bedtime  dextrose 40% Gel 15 Gram(s) Oral once  dextrose 50% Injectable 25 Gram(s) IV Push once  dextrose 50% Injectable 12.5 Gram(s) IV Push once  dextrose 50% Injectable 25 Gram(s) IV Push once  glucagon  Injectable 1 milliGRAM(s) IntraMuscular once  insulin lispro (ADMELOG) corrective regimen sliding scale   SubCutaneous three times a day before meals  insulin lispro (ADMELOG) corrective regimen sliding scale   SubCutaneous at bedtime    dextrose 5%. 1000 milliLiter(s) IV Continuous <Continuous>  dextrose 5%. 1000 milliLiter(s) IV Continuous <Continuous>  sodium chloride 0.9%. 1000 milliLiter(s) IV Continuous <Continuous>      PAST MEDICAL & SURGICAL HISTORY:  GERD (gastroesophageal reflux disease)    DM (diabetes mellitus)    HTN (hypertension)    Disorder of nasal sinus        FAMILY HISTORY:  Family history of heart disease (Mother)        SOCIAL HISTORY:    The patient is a nonsmoker       REVIEW OF SYSTEMS:  See HPI, otherwise complete 14 point review of systems negative        PHYSICAL EXAM:  T(C): 36.8 (03-01-21 @ 05:45), Max: 36.9 (02-28-21 @ 18:57)  HR: 74 (03-01-21 @ 05:45) (61 - 98)  BP: 128/52 (03-01-21 @ 05:45) (103/62 - 141/85)  RR: 16 (03-01-21 @ 05:45) (16 - 18)  SpO2: 100% (03-01-21 @ 05:45) (97% - 100%)  Wt(kg): --  I&O's Summary      Appearance: No Acute Distress; resting comfortably  HEENT:  Normal oral mucosa, PERRL, EOMI	  Cardiovascular: Normal S1 S2, No JVD, No murmurs/rubs/gallops  Respiratory: Normal respiratory effort; Lungs clear to auscultation bilaterally  Gastrointestinal:  Soft, Non-tender, + BS	  Skin: No rashes, No ecchymoses, No cyanosis	  Neurologic: Non-focal; no weakness  Extremities: No clubbing, cyanosis or edema  Vascular: Peripheral pulses palpable 2+ bilaterally  Psychiatry: Alert, Mood & affect appropriate    Laboratory Data:	 	    CBC Full  -  ( 27 Feb 2021 13:39 )  WBC Count : 6.86 K/uL  Hemoglobin : 15.1 g/dL  Hematocrit : 47.6 %  Platelet Count - Automated : 249 K/uL  Mean Cell Volume : 92.2 fL  Mean Cell Hemoglobin : 29.3 pg  Mean Cell Hemoglobin Concentration : 31.7 gm/dL  Auto Neutrophil # : 3.38 K/uL  Auto Lymphocyte # : 2.93 K/uL  Auto Monocyte # : 0.42 K/uL  Auto Eosinophil # : 0.07 K/uL  Auto Basophil # : 0.03 K/uL  Auto Neutrophil % : 49.4 %  Auto Lymphocyte % : 42.7 %  Auto Monocyte % : 6.1 %  Auto Eosinophil % : 1.0 %  Auto Basophil % : 0.4 %    02-27    140  |  103  |  13  ----------------------------<  178<H>  3.8   |  26  |  0.87    Ca    9.8      27 Feb 2021 13:39    TPro  8.4<H>  /  Alb  4.0  /  TBili  0.3  /  DBili  x   /  AST  18  /  ALT  14  /  AlkPhos  94  02-27      Interpretation of Telemetry: Sinus; PACs	    ECG:  	    Assessment: 86 year old woman with HTN, NIDDM, HLD and dementia presents with syncope found to have UTI.     Plan of Care:    #Syncope-  Secondary to UTI.  TTE and nuclear stress WNL.  ABx as ordered for UTI.  F/U UCx    #HTN-  BP acceptable on current regimen.    #Questionable thyroid mass-  Seen on CT.  Thyroid US ordered.    #ACP (advance care planning)-  Advanced care planning was discussed with the patient.  Cardiac findings were discussed in detail and all questions were answered.     Discharge planning pending PT evaluation and Urine sensitivities.    72 minutes spent on total encounter; more than 50% of the visit was spent counseling and/or coordinating care by the attending physician.   	  David Mackay MD [facility]  Cardiovascular Diseases  [phone]     Cardiovascular Disease Initial Evaluation    CHIEF COMPLAINT: Passing out    HISTORY OF PRESENT ILLNESS:  This is an 86 year old woman with HTN, NIDDM, HLD and dementia who presented to [facility] on 2/27/2021 after fall. Patient states that she has been having suprapubic discomfort and dysuria for 4 days, but did not think much of it. She was lowering herself into bed and lost consciousness and the next thing that she remembers was that she was on the floor. She did report some right upper chest/ shoulder pain after the fall.    Ms. Barnett was admitted for syncope and UTI.  Cardiology is consulted for atrial bigeminy and syncope.  Currently she denies chest pain or SOB.     Allergies  penicillin (Unknown)      MEDICATIONS:  amLODIPine   Tablet 10 milliGRAM(s) Oral daily  enoxaparin Injectable 40 milliGRAM(s) SubCutaneous daily  lisinopril 20 milliGRAM(s) Oral daily  levoFLOXacin  Tablet 250 milliGRAM(s) Oral every 24 hours      acetaminophen   Tablet .. 975 milliGRAM(s) Oral every 6 hours PRN  donepezil 5 milliGRAM(s) Oral at bedtime      atorvastatin 40 milliGRAM(s) Oral at bedtime  dextrose 40% Gel 15 Gram(s) Oral once  dextrose 50% Injectable 25 Gram(s) IV Push once  dextrose 50% Injectable 12.5 Gram(s) IV Push once  dextrose 50% Injectable 25 Gram(s) IV Push once  glucagon  Injectable 1 milliGRAM(s) IntraMuscular once  insulin lispro (ADMELOG) corrective regimen sliding scale   SubCutaneous three times a day before meals  insulin lispro (ADMELOG) corrective regimen sliding scale   SubCutaneous at bedtime    dextrose 5%. 1000 milliLiter(s) IV Continuous <Continuous>  dextrose 5%. 1000 milliLiter(s) IV Continuous <Continuous>  sodium chloride 0.9%. 1000 milliLiter(s) IV Continuous <Continuous>      PAST MEDICAL & SURGICAL HISTORY:  GERD (gastroesophageal reflux disease)    DM (diabetes mellitus)    HTN (hypertension)    Disorder of nasal sinus        FAMILY HISTORY:  Family history of heart disease (Mother)        SOCIAL HISTORY:    The patient is a nonsmoker       REVIEW OF SYSTEMS:  See HPI, otherwise complete 14 point review of systems negative        PHYSICAL EXAM:  T(C): 36.8 (03-01-21 @ 05:45), Max: 36.9 (02-28-21 @ 18:57)  HR: 74 (03-01-21 @ 05:45) (61 - 98)  BP: 128/52 (03-01-21 @ 05:45) (103/62 - 141/85)  RR: 16 (03-01-21 @ 05:45) (16 - 18)  SpO2: 100% (03-01-21 @ 05:45) (97% - 100%)  Wt(kg): --  I&O's Summary      Appearance: No Acute Distress; resting comfortably  HEENT:  Normal oral mucosa, PERRL, EOMI	  Cardiovascular: Normal S1 S2, No JVD, No murmurs/rubs/gallops  Respiratory: Normal respiratory effort; Lungs clear to auscultation bilaterally  Gastrointestinal:  Soft, Non-tender, + BS	  Skin: No rashes, No ecchymoses, No cyanosis	  Neurologic: Non-focal; no weakness  Extremities: No clubbing, cyanosis or edema  Vascular: Peripheral pulses palpable 2+ bilaterally  Psychiatry: Alert, Mood & affect appropriate    Laboratory Data:	 	    CBC Full  -  ( 27 Feb 2021 13:39 )  WBC Count : 6.86 K/uL  Hemoglobin : 15.1 g/dL  Hematocrit : 47.6 %  Platelet Count - Automated : 249 K/uL  Mean Cell Volume : 92.2 fL  Mean Cell Hemoglobin : 29.3 pg  Mean Cell Hemoglobin Concentration : 31.7 gm/dL  Auto Neutrophil # : 3.38 K/uL  Auto Lymphocyte # : 2.93 K/uL  Auto Monocyte # : 0.42 K/uL  Auto Eosinophil # : 0.07 K/uL  Auto Basophil # : 0.03 K/uL  Auto Neutrophil % : 49.4 %  Auto Lymphocyte % : 42.7 %  Auto Monocyte % : 6.1 %  Auto Eosinophil % : 1.0 %  Auto Basophil % : 0.4 %    02-27    140  |  103  |  13  ----------------------------<  178<H>  3.8   |  26  |  0.87    Ca    9.8      27 Feb 2021 13:39    TPro  8.4<H>  /  Alb  4.0  /  TBili  0.3  /  DBili  x   /  AST  18  /  ALT  14  /  AlkPhos  94  02-27      Interpretation of Telemetry: Sinus; PACs	    ECG:  	Sinus; RBBB    Assessment: 86 year old woman with HTN, NIDDM, HLD and dementia presents with syncope found to have UTI.     Plan of Care:    #Syncope-  Secondary to UTI.  EKG is sinus with a pre-existing RBBB.   TTE and nuclear stress WNL.  ABx as ordered for UTI.  F/U UCx    #HTN-  BP acceptable on current regimen.    #Questionable thyroid mass-  Seen on CT.  Thyroid US ordered.    #ACP (advance care planning)-  Advanced care planning was discussed with the patient.  Cardiac findings were discussed in detail and all questions were answered.     Discharge planning pending PT evaluation and Urine sensitivities.    72 minutes spent on total encounter; more than 50% of the visit was spent counseling and/or coordinating care by the attending physician.   	  David Mackay MD [facility]  Cardiovascular Diseases  [phone]

## 2021-03-01 NOTE — DISCHARGE NOTE PROVIDER - HOSPITAL COURSE
Ms Trang Barnett is a 86 year old F with PMH significant for but not limited to HTN, DM2, HDL, dementia, who is presenting to the hospital after fall with UTI.     UTI (urinary tract infection).    - patient has significant dysuria and suprapubic discomfort   - U/a suggestive of infection  - given levofloxacin in CDU, can continue for total of 3 days.     Syncope and collapse.   - denies preceding symptoms, but states had chest pain subsequently   - EKG without ST changes, trops 10->11  - CXR with clear lungs  - Echo showed LV thickening, mild diastolic dysfunction and hyperdynamic LV  - normal nuclear stress  - Seen and evaluated by cardiology    DM (diabetes mellitus).  -on januvia and repaglinide at home   - continue with ISS and FS ACHS.     HTN (hypertension).  - BP controlled   - Home amlodipine, lisinopril continued   - Pt to follow up with PCP as outpatient for further blood pressure management.      HLD (hyperlipidemia).   - statin continued  - pt to follow up with PCP as outpatient for further blood pressure management.     Dementia.  - donepezil continued   - Pt to follow up with PCP for further management.      Prophylactic measure.    - DVT ppx: LMWH.     On_________, discussed with __________, patient is medically cleared and optimized for discharge today. All medications were reviewed with attending, and sent to mutually agreed upon pharmacy.   Ms Trang Barnett is a 86 year old F with PMH significant for but not limited to HTN, DM2, HDL, dementia, who is presenting to the hospital after fall with UTI.     UTI (urinary tract infection).    - patient has significant dysuria and suprapubic discomfort   - U/a suggestive of infection  - given levofloxacin in CDU, can continue for total of 3 days.     Syncope and collapse.   - denies preceding symptoms, but states had chest pain subsequently   - EKG without ST changes, trops 10->11  - CXR with clear lungs  - Echo showed LV thickening, mild diastolic dysfunction and hyperdynamic LV  - normal nuclear stress  - Seen and evaluated by cardiology    Thyroid Nodule   - You were found to have a thyroid nodule  - You had a thyroid ultrasound, and a biopsy was recommended  - You are scheduled to have a thyroid biopsy with [facility] on ........??????    DM (diabetes mellitus).  -on januvia and repaglinide at home   - continue with ISS and FS ACHS.     HTN (hypertension).  - BP controlled   - Home amlodipine, lisinopril continued   - Pt to follow up with PCP as outpatient for further blood pressure management.      HLD (hyperlipidemia).   - statin continued  - pt to follow up with PCP as outpatient for further blood pressure management.     Dementia.  - donepezil continued   - Pt to follow up with PCP for further management.      Prophylactic measure.    - DVT ppx: LMWH.     On 3/2/2021, discussed with Dr. Phoenix, patient is medically cleared and optimized for discharge today. All medications were reviewed with attending, and sent to mutually agreed upon pharmacy.   Ms Trang Barnett is a 86 year old F with PMH significant for but not limited to HTN, DM2, HDL, dementia, who is presenting to the hospital after fall with UTI.     UTI (urinary tract infection).    - patient has significant dysuria and suprapubic discomfort   - U/a suggestive of infection  - given levofloxacin in CDU, can continue for total of 3 days.     Syncope and collapse.   - denies preceding symptoms, but states had chest pain subsequently   - EKG without ST changes, trops 10->11  - CXR with clear lungs  - Echo showed LV thickening, mild diastolic dysfunction and hyperdynamic LV  - normal nuclear stress  - Seen and evaluated by cardiology    Thyroid Nodule   - You were found to have a thyroid nodule  - You had a thyroid ultrasound, and a biopsy was recommended  - You are scheduled to have a thyroid biopsy with [facility] on 3/9/2021 at 10:00 am.    DM (diabetes mellitus).  -on januvia and repaglinide at home   - continue with ISS and FS ACHS.     HTN (hypertension).  - BP controlled   - Home amlodipine, lisinopril continued   - Pt to follow up with PCP as outpatient for further blood pressure management.      HLD (hyperlipidemia).   - statin continued  - pt to follow up with PCP as outpatient for further blood pressure management.     Dementia.  - donepezil continued   - Pt to follow up with PCP for further management.      Prophylactic measure.    - DVT ppx: LMWH.     On 3/2/2021, discussed with Dr. Phoenix, patient is medically cleared and optimized for discharge today. All medications were reviewed with attending, and sent to mutually agreed upon pharmacy.   Ms Trang Barnett is a 86 year old F with PMH significant for but not limited to HTN, DM2, HDL, dementia, who is presenting to the hospital after fall with UTI.     UTI (urinary tract infection).    - patient has significant dysuria and suprapubic discomfort   - U/a suggestive of infection, Urine culture + klebsiella  - given levofloxacin in CDU, can continue for total of 3 days.     Syncope and collapse.   - denies preceding symptoms, but states had chest pain subsequently   - EKG without ST changes, trops 10->11  - CXR with clear lungs  - Echo showed LV thickening, mild diastolic dysfunction and hyperdynamic LV  - normal nuclear stress  - Seen and evaluated by cardiology    Thyroid Nodule   - You were found to have a thyroid nodule  - You had a thyroid ultrasound, and a biopsy was recommended  - You had a thyroid biopsy with [facility] on 3/3/2021.  - Please follow up with ENT for further follow up and to discuss biopsy results.     DM (diabetes mellitus).  -on januvia and repaglinide at home   - continue with ISS and FS ACHS.     HTN (hypertension).  - BP controlled   - Home amlodipine, lisinopril continued   - Pt to follow up with PCP as outpatient for further blood pressure management.      HLD (hyperlipidemia).   - statin continued  - pt to follow up with PCP as outpatient for further blood pressure management.     Dementia.  - donepezil continued   - Pt to follow up with PCP for further management.      Prophylactic measure.    - DVT ppx: LMWH.     On 3/2/2021, discussed with Dr. Phoenix, patient is medically cleared and optimized for discharge today. All medications were reviewed with attending, and sent to mutually agreed upon pharmacy.   Ms Trang Barnett is a 86 year old F with PMH significant for but not limited to HTN, DM2, HDL, dementia, who is presenting to the hospital after fall with UTI.     UTI (urinary tract infection).    - patient has significant dysuria and suprapubic discomfort   - U/a suggestive of infection, Urine culture + klebsiella  - given levofloxacin in CDU, can continue for total of 3 days.     Syncope and collapse.   - denies preceding symptoms, but states had chest pain subsequently   - EKG without ST changes, trops 10->11  - CXR with clear lungs  - Echo showed LV thickening, mild diastolic dysfunction and hyperdynamic LV  - normal nuclear stress  - Seen and evaluated by cardiology    Thyroid Nodule   - You were found to have a thyroid nodule  - You had a thyroid ultrasound, and a biopsy was recommended  - You had a thyroid biopsy with [facility] on 3/3/2021.  - Please follow up with ENT for further follow up and to discuss biopsy results.     DM (diabetes mellitus).  -on januvia and repaglinide at home   - continue with ISS and FS ACHS.     HTN (hypertension).  - BP controlled   - Home amlodipine, lisinopril continued   - Pt to follow up with PCP as outpatient for further blood pressure management.      HLD (hyperlipidemia).   - statin continued  - pt to follow up with PCP as outpatient for further blood pressure management.     Dementia.  - donepezil continued   - Pt to follow up with PCP for further management.      Prophylactic measure.    - DVT ppx: LMWH.     On 3/2/2021, discussed with Dr. Mackay, patient is medically cleared and optimized for discharge today. All medications were reviewed with attending, and sent to mutually agreed upon pharmacy.

## 2021-03-01 NOTE — CONSULT NOTE ADULT - SUBJECTIVE AND OBJECTIVE BOX
Patient is a 86y old  Female who presents with a chief complaint of fall       HPI:  Ms Trang Barnett is a 86 year old F with PMH significant for but not limited to HTN, DM2, HDL, dementia, who is presenting to the hospital after fall. Patient states that she has been having suprapubic discomfort and dysuria for 4 days, but did not think much of it. Yesterday states that she was lowering herself into bed and lost consciousness and the next thing that she remembers was that she was on the floor. She did report some right upper chest/ shoulder pain after the fall.  She states that pain was aching sensation, non-radiating, moderate in intensity and was worsened which movement of the right arm.       PAST MEDICAL & SURGICAL HISTORY:  GERD (gastroesophageal reflux disease)    DM (diabetes mellitus)    HTN (hypertension)    Disorder of nasal sinus        Social History: denies smoking etc     FAMILY HISTORY:  Family history of heart disease (Mother)        Allergies    penicillin (Unknown)    Intolerances        REVIEW OF SYSTEMS:    CONSTITUTIONAL: No fever, weight loss, or fatigue  EYES: No eye pain, visual disturbances, or discharge  RESPIRATORY: No cough, wheezing, chills or hemoptysis; No shortness of breath  CARDIOVASCULAR: No chest pain, palpitations, dizziness, or leg swelling  GASTROINTESTINAL: No abdominal or epigastric pain. No nausea, vomiting, or hematemesis; No diarrhea or constipation. No melena or hematochezia.  GENITOURINARY: No dysuria, frequency, hematuria, or incontinence  NEUROLOGICAL: No headaches, memory loss, loss of strength, numbness, or tremors  SKIN: No itching, burning, rashes, or lesions   ENDOCRINE: No heat or cold intolerance; No hair loss  MUSCULOSKELETAL: No joint pain or swelling; No muscle, back, or extremity pain  PSYCHIATRIC: No depression, anxiety, mood swings, or difficulty sleeping      MEDICATIONS  (STANDING):  amLODIPine   Tablet 10 milliGRAM(s) Oral daily  atorvastatin 40 milliGRAM(s) Oral at bedtime  dextrose 40% Gel 15 Gram(s) Oral once  dextrose 5%. 1000 milliLiter(s) (50 mL/Hr) IV Continuous <Continuous>  dextrose 5%. 1000 milliLiter(s) (100 mL/Hr) IV Continuous <Continuous>  dextrose 50% Injectable 25 Gram(s) IV Push once  dextrose 50% Injectable 12.5 Gram(s) IV Push once  dextrose 50% Injectable 25 Gram(s) IV Push once  donepezil 5 milliGRAM(s) Oral at bedtime  enoxaparin Injectable 40 milliGRAM(s) SubCutaneous daily  glucagon  Injectable 1 milliGRAM(s) IntraMuscular once  insulin lispro (ADMELOG) corrective regimen sliding scale   SubCutaneous three times a day before meals  insulin lispro (ADMELOG) corrective regimen sliding scale   SubCutaneous at bedtime  levoFLOXacin  Tablet 250 milliGRAM(s) Oral every 24 hours  lisinopril 20 milliGRAM(s) Oral daily  sodium chloride 0.9%. 1000 milliLiter(s) (100 mL/Hr) IV Continuous <Continuous>    MEDICATIONS  (PRN):  acetaminophen   Tablet .. 975 milliGRAM(s) Oral every 6 hours PRN Moderate Pain (4 - 6)      Vital Signs Last 24 Hrs  T(C): 36.8 (01 Mar 2021 16:20), Max: 36.9 (2021 18:57)  T(F): 98.2 (01 Mar 2021 16:20), Max: 98.5 (2021 18:57)  HR: 81 (01 Mar 2021 16:20) (61 - 90)  BP: 109/60 (01 Mar 2021 16:20) (103/62 - 130/78)  BP(mean): 92 (01 Mar 2021 05:45) (82 - 92)  RR: 16 (01 Mar 2021 16:20) (16 - 18)  SpO2: 100% (01 Mar 2021 16:20) (97% - 100%)    PHYSICAL EXAM:    GENERAL: NAD, well-groomed, well-developed  HEAD:  Atraumatic, Normocephalic  EYES: EOMI, PERRLA, conjunctiva and sclera clear  ENMT: No tonsillar erythema, exudates, or enlargement; Moist mucous membranes, Good dentition, No lesions  NECK: Supple, No JVD, Normal thyroid  NERVOUS SYSTEM:  Alert & Oriented X3, No new  focal sign .  CHEST/LUNG: Air entry good bilaterally; No rales, rhonchi, wheezing, or rubs  HEART: Regular rate and rhythm; No murmurs, rubs, or gallops  ABDOMEN: Soft, Nontender, Nondistended; Bowel sounds present  EXTREMITIES:  2+ Peripheral Pulses, No clubbing, cyanosis, or edema      LABS:            Urinalysis Basic - ( 2021 13:01 )    Color: Yellow / Appearance: Slightly Turbid / S.018 / pH: x  Gluc: x / Ketone: Negative  / Bili: Negative / Urobili: <2 mg/dL   Blood: x / Protein: Trace / Nitrite: Positive   Leuk Esterase: Moderate / RBC: x / WBC x   Sq Epi: x / Non Sq Epi: x / Bacteria: x          RADIOLOGY & ADDITIONAL STUDIES:

## 2021-03-01 NOTE — DISCHARGE NOTE PROVIDER - NSDCCPCAREPLAN_GEN_ALL_CORE_FT
PRINCIPAL DISCHARGE DIAGNOSIS  Diagnosis: Syncope and collapse  Assessment and Plan of Treatment:       SECONDARY DISCHARGE DIAGNOSES  Diagnosis: UTI (urinary tract infection)  Assessment and Plan of Treatment: You were treated with antibiotics for a urinary tract infection.    Diagnosis: Thyroid nodule  Assessment and Plan of Treatment: You were found to have a thyroid nodule. You had an ultrasound, which recommended you have a thryoid biopsy. You are schedule     PRINCIPAL DISCHARGE DIAGNOSIS  Diagnosis: Syncope and collapse  Assessment and Plan of Treatment: You were admitted for fall.      SECONDARY DISCHARGE DIAGNOSES  Diagnosis: UTI (urinary tract infection)  Assessment and Plan of Treatment: You were treated with antibiotics for a urinary tract infection.    Diagnosis: Thyroid nodule  Assessment and Plan of Treatment: You were found to have a thyroid nodule. You had an ultrasound, which recommended you have a thryoid biopsy. You are scheduled for a thyroid biopsy [facility] Interventional Radiology team on............     PRINCIPAL DISCHARGE DIAGNOSIS  Diagnosis: Syncope and collapse  Assessment and Plan of Treatment: You were admitted for fall. Your EKG was normal, your cardiac enzymes were normal and your CXR was also normal. You were seen and evalauted by cardiology in the hospital, who recommended you follow up as outpatient.      SECONDARY DISCHARGE DIAGNOSES  Diagnosis: UTI (urinary tract infection)  Assessment and Plan of Treatment: You were treated with antibiotics for a urinary tract infection.    Diagnosis: Thyroid nodule  Assessment and Plan of Treatment: You were found to have a thyroid nodule. You had an ultrasound, which recommended you have a thryoid biopsy. You are scheduled for a thyroid biopsy [facility] Interventional Radiology team on............    Diagnosis: HTN (hypertension)  Assessment and Plan of Treatment: Low sodium and fat diet, continue anti-hypertensive medications, and follow up with primary care physician.      Diagnosis: Dementia  Assessment and Plan of Treatment: Continue with medications and follow up with PCP for further management.    Diagnosis: DM (diabetes mellitus)  Assessment and Plan of Treatment: Monitor finger sticks pre-meal and bedtime, low salt, fat and carbohydrate diet, minimize glucose intake.  Exercise daily for at least 30 minutes and weight loss.  Follow up with primary care physician and endocrinologist for routine Hemoglobin A1C checks and management.  Follow up with your ophthalmologist for routine yearly vision exams.       PRINCIPAL DISCHARGE DIAGNOSIS  Diagnosis: Syncope and collapse  Assessment and Plan of Treatment: You were admitted for fall. Your EKG was normal, your cardiac enzymes were normal and your CXR was also normal. You were seen and evalauted by cardiology in the hospital, who recommended you follow up as outpatient.      SECONDARY DISCHARGE DIAGNOSES  Diagnosis: UTI (urinary tract infection)  Assessment and Plan of Treatment: You were treated with antibiotics for a urinary tract infection.    Diagnosis: Thyroid nodule  Assessment and Plan of Treatment: You were found to have a thyroid nodule. You had an ultrasound, which recommended you have a thryoid biopsy. You are scheduled for a thyroid biopsy [facility] Interventional Radiology team on 3/9/2021 at 10:00am.    Diagnosis: HTN (hypertension)  Assessment and Plan of Treatment: Low sodium and fat diet, continue anti-hypertensive medications, and follow up with primary care physician.      Diagnosis: Dementia  Assessment and Plan of Treatment: Continue with medications and follow up with PCP for further management.    Diagnosis: DM (diabetes mellitus)  Assessment and Plan of Treatment: Monitor finger sticks pre-meal and bedtime, low salt, fat and carbohydrate diet, minimize glucose intake.  Exercise daily for at least 30 minutes and weight loss.  Follow up with primary care physician and endocrinologist for routine Hemoglobin A1C checks and management.  Follow up with your ophthalmologist for routine yearly vision exams.       PRINCIPAL DISCHARGE DIAGNOSIS  Diagnosis: Syncope and collapse  Assessment and Plan of Treatment: You were admitted for fall. Your EKG was normal, your cardiac enzymes were normal and your CXR was also normal. You were seen and evalauted by cardiology in the hospital, who recommended you follow up as outpatient.      SECONDARY DISCHARGE DIAGNOSES  Diagnosis: UTI (urinary tract infection)  Assessment and Plan of Treatment: You were treated with antibiotics for a urinary tract infection.    Diagnosis: Thyroid nodule  Assessment and Plan of Treatment: You were found to have a thyroid nodule. You had an ultrasound, which recommended you have a thryoid biopsy. You had a thyroid biopsy with [facility] Interventional Radiology. You are to follow up with ENT to discuss the results of this biopsy.    Diagnosis: HTN (hypertension)  Assessment and Plan of Treatment: Low sodium and fat diet, continue anti-hypertensive medications, and follow up with primary care physician.      Diagnosis: Dementia  Assessment and Plan of Treatment: Continue with medications and follow up with PCP for further management.    Diagnosis: DM (diabetes mellitus)  Assessment and Plan of Treatment: Monitor finger sticks pre-meal and bedtime, low salt, fat and carbohydrate diet, minimize glucose intake.  Exercise daily for at least 30 minutes and weight loss.  Follow up with primary care physician and endocrinologist for routine Hemoglobin A1C checks and management.  Follow up with your ophthalmologist for routine yearly vision exams.

## 2021-03-01 NOTE — DISCHARGE NOTE PROVIDER - NSDCFUADDAPPT_GEN_ALL_CORE_FT
Follow up with your primary care physician for further monitoring in 1-2 weeks. Please call to arrange appointment. If you do not have a PCP you can establish care at the address above.    Follow up with cardiology within 1-2 weeks of discharge.   Follow up with your primary care physician for further monitoring in 1-2 weeks. Please call to arrange appointment. If you do not have a PCP you can establish care at the address above.    Follow up with cardiology within 1-2 weeks of discharge.  Follow up with Interventional Radiology team. You have a thyroid biopsy scheduled on 3/9/2021 at 10:00am in room [facility] at [facility].    Follow up with your primary care physician for further monitoring in 1-2 weeks. Please call to arrange appointment. If you do not have a PCP you can establish care at the address above.    Follow up with cardiology within 1-2 weeks of discharge.  Follow up with ENT to discuss the results of your thyroid biopsy.

## 2021-03-01 NOTE — DISCHARGE NOTE PROVIDER - NSDCMRMEDTOKEN_GEN_ALL_CORE_FT
acetaminophen 325 mg oral tablet: 2 tab(s) orally every 6 hours, As needed, mild and moderate pain  amLODIPine 10 mg oral tablet: 1 tab(s) orally once a day  atorvastatin 40 mg oral tablet: 1 tab(s) orally once a day (at bedtime)  donepezil 5 mg oral tablet: 1 tab(s) orally once a day (at bedtime)  gabapentin 300 mg oral capsule: 1 cap(s) orally 2 times a day  Januvia 100 mg oral tablet: 1 tab(s) orally once a day  lisinopril 20 mg oral tablet: 1 tab(s) orally once a day  repaglinide 0.5 mg oral tablet: 1 tab(s) orally 2 times a day (with meals)  Rolling Walker Dx: altered gait: Apply topically to affected area once a day   Tessalon Perles 100 mg oral capsule: 1 cap(s) orally 3 times a day, As Needed -for cough    acetaminophen 325 mg oral tablet: 2 tab(s) orally every 6 hours, As needed, mild and moderate pain  amLODIPine 10 mg oral tablet: 1 tab(s) orally once a day  atorvastatin 40 mg oral tablet: 1 tab(s) orally once a day (at bedtime)  donepezil 5 mg oral tablet: 1 tab(s) orally once a day (at bedtime)  gabapentin 300 mg oral capsule: 1 cap(s) orally 2 times a day  Januvia 100 mg oral tablet: 1 tab(s) orally once a day  lisinopril 20 mg oral tablet: 1 tab(s) orally once a day  repaglinide 0.5 mg oral tablet: 1 tab(s) orally 2 times a day (with meals)  Rolling Walker Dx: altered gait: Apply topically to affected area once a day

## 2021-03-01 NOTE — PHYSICAL THERAPY INITIAL EVALUATION ADULT - GENERAL OBSERVATIONS, REHAB EVAL
Patient was received supine and left sitting up (RN made aware) at the side of the bed with call bell in reach. SpO2 = 97% & HR = 87

## 2021-03-01 NOTE — DISCHARGE NOTE PROVIDER - CARE PROVIDER_API CALL
David Mackay)  Internal Medicine  [address] [place] [address]  Phone: [phone]  Fax: [phone]  Follow Up Time:    David Mackay)  Internal Medicine  [address] [place] [address]  Phone: [phone]  Fax: [phone]  Follow Up Time:     Severino Camargo)  Otolaryngology  [address] [place] [address]  Phone: [phone]  Fax: [phone]  Follow Up Time:

## 2021-03-02 LAB
ANION GAP SERPL CALC-SCNC: 12 MMOL/L — SIGNIFICANT CHANGE UP (ref 7–14)
BUN SERPL-MCNC: 16 MG/DL — SIGNIFICANT CHANGE UP (ref 7–23)
CALCIUM SERPL-MCNC: 8.8 MG/DL — SIGNIFICANT CHANGE UP (ref 8.4–10.5)
CHLORIDE SERPL-SCNC: 105 MMOL/L — SIGNIFICANT CHANGE UP (ref 98–107)
CO2 SERPL-SCNC: 24 MMOL/L — SIGNIFICANT CHANGE UP (ref 22–31)
CREAT SERPL-MCNC: 0.84 MG/DL — SIGNIFICANT CHANGE UP (ref 0.5–1.3)
GLUCOSE SERPL-MCNC: 162 MG/DL — HIGH (ref 70–99)
HCT VFR BLD CALC: 40.7 % — SIGNIFICANT CHANGE UP (ref 34.5–45)
HGB BLD-MCNC: 13 G/DL — SIGNIFICANT CHANGE UP (ref 11.5–15.5)
MAGNESIUM SERPL-MCNC: 2 MG/DL — SIGNIFICANT CHANGE UP (ref 1.6–2.6)
MCHC RBC-ENTMCNC: 29.6 PG — SIGNIFICANT CHANGE UP (ref 27–34)
MCHC RBC-ENTMCNC: 31.9 GM/DL — LOW (ref 32–36)
MCV RBC AUTO: 92.7 FL — SIGNIFICANT CHANGE UP (ref 80–100)
NRBC # BLD: 0 /100 WBCS — SIGNIFICANT CHANGE UP
NRBC # FLD: 0 K/UL — SIGNIFICANT CHANGE UP
PHOSPHATE SERPL-MCNC: 3.4 MG/DL — SIGNIFICANT CHANGE UP (ref 2.5–4.5)
PLATELET # BLD AUTO: 216 K/UL — SIGNIFICANT CHANGE UP (ref 150–400)
POTASSIUM SERPL-MCNC: 3.6 MMOL/L — SIGNIFICANT CHANGE UP (ref 3.5–5.3)
POTASSIUM SERPL-SCNC: 3.6 MMOL/L — SIGNIFICANT CHANGE UP (ref 3.5–5.3)
RBC # BLD: 4.39 M/UL — SIGNIFICANT CHANGE UP (ref 3.8–5.2)
RBC # FLD: 13.5 % — SIGNIFICANT CHANGE UP (ref 10.3–14.5)
SODIUM SERPL-SCNC: 141 MMOL/L — SIGNIFICANT CHANGE UP (ref 135–145)
WBC # BLD: 6.84 K/UL — SIGNIFICANT CHANGE UP (ref 3.8–10.5)
WBC # FLD AUTO: 6.84 K/UL — SIGNIFICANT CHANGE UP (ref 3.8–10.5)

## 2021-03-02 PROCEDURE — 70490 CT SOFT TISSUE NECK W/O DYE: CPT | Mod: 26

## 2021-03-02 RX ADMIN — ATORVASTATIN CALCIUM 40 MILLIGRAM(S): 80 TABLET, FILM COATED ORAL at 21:27

## 2021-03-02 RX ADMIN — AMLODIPINE BESYLATE 10 MILLIGRAM(S): 2.5 TABLET ORAL at 05:11

## 2021-03-02 RX ADMIN — Medication 975 MILLIGRAM(S): at 17:17

## 2021-03-02 RX ADMIN — DONEPEZIL HYDROCHLORIDE 5 MILLIGRAM(S): 10 TABLET, FILM COATED ORAL at 21:27

## 2021-03-02 RX ADMIN — Medication 1: at 12:07

## 2021-03-02 RX ADMIN — ENOXAPARIN SODIUM 40 MILLIGRAM(S): 100 INJECTION SUBCUTANEOUS at 12:07

## 2021-03-02 RX ADMIN — LISINOPRIL 20 MILLIGRAM(S): 2.5 TABLET ORAL at 05:11

## 2021-03-02 RX ADMIN — Medication 975 MILLIGRAM(S): at 10:09

## 2021-03-02 NOTE — CONSULT NOTE ADULT - ASSESSMENT
86 year old F with PMH significant for but not limited to HTN, DM2, HDL, dementia, who is presenting to the hospital after fall with right shoulder pain, now with thyroid nodule.  - Will plan for thyroid nodule biopsy in IR as an outpatient. Will be performed under local anesthesia only.  - Scheduled for 3/9/21 at 10:00am. Pt aware to arrive by 9:00am.    [id number]
 86 year old F with PMH significant for but not limited to HTN, DM2, HDL, dementia, who is presenting to the hospital after fall. Patient states that she has been having suprapubic discomfort and dysuria for 4 days, but did not think much of it. Yesterday states that she was lowering herself into bed and lost consciousness and the next thing that she remembers was that she was on the floor. She did report some right upper chest/ shoulder pain after the fall.  She states that pain was aching sensation, non-radiating, moderate in intensity and was worsened which movement of the right arm.

## 2021-03-02 NOTE — PROGRESS NOTE ADULT - SUBJECTIVE AND OBJECTIVE BOX
Cardiovascular Disease Progress Note    Overnight events: No acute events overnight. Patient denies chest pain or SOB.   Otherwise review of systems negative    Objective Findings:  T(C): 36.7 (03-02-21 @ 05:07), Max: 36.8 (03-01-21 @ 16:20)  HR: 62 (03-02-21 @ 05:07) (62 - 81)  BP: 136/64 (03-02-21 @ 05:07) (109/60 - 136/64)  RR: 17 (03-02-21 @ 05:07) (16 - 18)  SpO2: 99% (03-02-21 @ 05:07) (99% - 100%)  Wt(kg): --  Daily     Daily       Physical Exam:  Gen: NAD; Patient resting comfortably  HEENT: EOMI, Normocephalic/ atraumatic  CV: RRR, normal S1 + S2, no m/r/g  Lungs:  Normal respiratory effort; clear to auscultation bilaterally  Abd: soft, non-tender; bowel sounds present  Ext: No edema; warm and well perfused    Telemetry: Sinus    Laboratory Data:                        13.0   6.84  )-----------( 216      ( 02 Mar 2021 06:35 )             40.7     03-02    141  |  105  |  16  ----------------------------<  162<H>  3.6   |  24  |  0.84    Ca    8.8      02 Mar 2021 06:35  Phos  3.4     03-02  Mg     2.0     03-02                Inpatient Medications:  MEDICATIONS  (STANDING):  amLODIPine   Tablet 10 milliGRAM(s) Oral daily  atorvastatin 40 milliGRAM(s) Oral at bedtime  dextrose 40% Gel 15 Gram(s) Oral once  dextrose 5%. 1000 milliLiter(s) (50 mL/Hr) IV Continuous <Continuous>  dextrose 5%. 1000 milliLiter(s) (100 mL/Hr) IV Continuous <Continuous>  dextrose 50% Injectable 25 Gram(s) IV Push once  dextrose 50% Injectable 12.5 Gram(s) IV Push once  dextrose 50% Injectable 25 Gram(s) IV Push once  donepezil 5 milliGRAM(s) Oral at bedtime  enoxaparin Injectable 40 milliGRAM(s) SubCutaneous daily  glucagon  Injectable 1 milliGRAM(s) IntraMuscular once  insulin lispro (ADMELOG) corrective regimen sliding scale   SubCutaneous three times a day before meals  insulin lispro (ADMELOG) corrective regimen sliding scale   SubCutaneous at bedtime  levoFLOXacin  Tablet 250 milliGRAM(s) Oral every 24 hours  lisinopril 20 milliGRAM(s) Oral daily  sodium chloride 0.9%. 1000 milliLiter(s) (100 mL/Hr) IV Continuous <Continuous>      Assessment: 86 year old woman with HTN, NIDDM, HLD and dementia presents with syncope found to have UTI.     Plan of Care:    #Syncope-  Secondary to UTI.  EKG is sinus with a pre-existing RBBB.   TTE and nuclear stress WNL.  ABx as ordered for UTI.  Awaiting urine sensitivities.     #HTN-  BP acceptable on current regimen.    #Questionable thyroid mass-  Thyroid US noted.  Care discussed with patient's daughter, Mac Rangel.  She agrees to outpatient biopsy.    Discharge pending sensitivities.  PT evaluation noted.         Over 25 minutes spent on total encounter; more than 50% of the visit was spent counseling and/or coordinating care by the attending physician.      David Mackay MD [facility]  Cardiovascular Disease  [phone] Cardiovascular Disease Progress Note    Overnight events: No acute events overnight. Patient denies chest pain or SOB.   Otherwise review of systems negative    Objective Findings:  T(C): 36.7 (03-02-21 @ 05:07), Max: 36.8 (03-01-21 @ 16:20)  HR: 62 (03-02-21 @ 05:07) (62 - 81)  BP: 136/64 (03-02-21 @ 05:07) (109/60 - 136/64)  RR: 17 (03-02-21 @ 05:07) (16 - 18)  SpO2: 99% (03-02-21 @ 05:07) (99% - 100%)  Wt(kg): --  Daily     Daily       Physical Exam:  Gen: NAD; Patient resting comfortably  HEENT: EOMI, Normocephalic/ atraumatic  CV: RRR, normal S1 + S2, no m/r/g  Lungs:  Normal respiratory effort; clear to auscultation bilaterally  Abd: soft, non-tender; bowel sounds present  Ext: No edema; warm and well perfused    Telemetry: Sinus    Laboratory Data:                        13.0   6.84  )-----------( 216      ( 02 Mar 2021 06:35 )             40.7     03-02    141  |  105  |  16  ----------------------------<  162<H>  3.6   |  24  |  0.84    Ca    8.8      02 Mar 2021 06:35  Phos  3.4     03-02  Mg     2.0     03-02                Inpatient Medications:  MEDICATIONS  (STANDING):  amLODIPine   Tablet 10 milliGRAM(s) Oral daily  atorvastatin 40 milliGRAM(s) Oral at bedtime  dextrose 40% Gel 15 Gram(s) Oral once  dextrose 5%. 1000 milliLiter(s) (50 mL/Hr) IV Continuous <Continuous>  dextrose 5%. 1000 milliLiter(s) (100 mL/Hr) IV Continuous <Continuous>  dextrose 50% Injectable 25 Gram(s) IV Push once  dextrose 50% Injectable 12.5 Gram(s) IV Push once  dextrose 50% Injectable 25 Gram(s) IV Push once  donepezil 5 milliGRAM(s) Oral at bedtime  enoxaparin Injectable 40 milliGRAM(s) SubCutaneous daily  glucagon  Injectable 1 milliGRAM(s) IntraMuscular once  insulin lispro (ADMELOG) corrective regimen sliding scale   SubCutaneous three times a day before meals  insulin lispro (ADMELOG) corrective regimen sliding scale   SubCutaneous at bedtime  levoFLOXacin  Tablet 250 milliGRAM(s) Oral every 24 hours  lisinopril 20 milliGRAM(s) Oral daily  sodium chloride 0.9%. 1000 milliLiter(s) (100 mL/Hr) IV Continuous <Continuous>      Assessment: 86 year old woman with HTN, NIDDM, HLD and dementia presents with syncope found to have UTI.     Plan of Care:    #Syncope-  Secondary to UTI.  EKG is sinus with a pre-existing RBBB.   TTE and nuclear stress WNL.  ABx as ordered for UTI.  Awaiting urine sensitivities.     #HTN-  BP acceptable on current regimen.    #Questionable thyroid mass-  Thyroid US noted.  Care discussed with patient's daughter, Mac Rangel.  She agrees to outpatient biopsy (scheduled with IR at [facility]).    Discharge pending sensitivities.  PT evaluation noted.         Over 25 minutes spent on total encounter; more than 50% of the visit was spent counseling and/or coordinating care by the attending physician.      David Mackay MD [facility]  Cardiovascular Disease  [phone]

## 2021-03-02 NOTE — PROGRESS NOTE ADULT - ASSESSMENT
86 year old F with PMH significant for but not limited to HTN, DM2, HDL, dementia, who is presenting to the hospital after fall. Patient states that she has been having suprapubic discomfort and dysuria for 4 days, but did not think much of it. Yesterday states that she was lowering herself into bed and lost consciousness and the next thing that she remembers was that she was on the floor. She did report some right upper chest/ shoulder pain after the fall.  She states that pain was aching sensation, non-radiating, moderate in intensity and was worsened which movement of the right arm.      Problem/Recommendation - 1:  Problem: Syncope and collapse. Recommendation: CT scan Head and Cx Spine, TTE and NST noted.   Carotid doppler noted.   Cards helping.     Problem/Recommendation - 2:  ·  Problem: Chest pain.  Recommendation: TTE and NST noted.      Problem/Recommendation - 3:  ·  Problem: DM (diabetes mellitus).  Recommendation: Sugars in good range.      Problem/Recommendation - 4:  ·  Problem: HTN (hypertension).  Recommendation: BP meds with hold parameters.      Problem/Recommendation - 5:  ·  Problem: UTI (urinary tract infection).  Recommendation: Abxs. Culture pending.      Problem/Recommendation - 6:  Problem: Thyroid nodule. Recommendation: Needs Bx so will consult IR.     Problem/Recommendation - 7:  Problem: Right shoulder pain radiating down right arm . Recommendation: X Ray right shoulder and CT cervical spine noted. Pain meds .   86 year old F with PMH significant for but not limited to HTN, DM2, HDL, dementia, who is presenting to the hospital after fall. Patient states that she has been having suprapubic discomfort and dysuria for 4 days, but did not think much of it. Yesterday states that she was lowering herself into bed and lost consciousness and the next thing that she remembers was that she was on the floor. She did report some right upper chest/ shoulder pain after the fall.  She states that pain was aching sensation, non-radiating, moderate in intensity and was worsened which movement of the right arm.      Problem/Recommendation - 1:  Problem: Syncope and collapse. Recommendation: CT scan Head and Cx Spine, TTE and NST noted.   Carotid doppler noted.   Cards helping.     Problem/Recommendation - 2:  ·  Problem: Chest pain.  Recommendation: TTE and NST noted.      Problem/Recommendation - 3:  ·  Problem: DM (diabetes mellitus).  Recommendation: Sugars in good range.      Problem/Recommendation - 4:  ·  Problem: HTN (hypertension).  Recommendation: BP meds with hold parameters.      Problem/Recommendation - 5:  ·  Problem: UTI (urinary tract infection).  Recommendation: Abxs. Culture pending.      Problem/Recommendation - 6:  Problem: Thyroid nodule. Recommendation: Needs Bx so will consult IR.     Problem/Recommendation - 7:  Problem: Right shoulder pain radiating down right arm . Recommendation: X Ray right shoulder and CT cervical spine noted. Pain meds .   Will get CT scan of soft tissue of neck .

## 2021-03-02 NOTE — CHART NOTE - NSCHARTNOTEFT_GEN_A_CORE
PRE-INTERVENTIONAL RADIOLOGY PROCEDURE NOTE      Patient Age: 86    Patient Gender: female    Procedure: thyroid biopsy    Diagnosis/Indication: thryoid nodule    Interventional Radiology Attending Physician: Dr. Nathan    Ordering Attending Physician: Dr. Mackay    Pertinent Medical History: Ms Trang Barnett is a 86 year old F with PMH significant for but not limited to HTN, DM2, HDL, dementia, who is presenting to the hospital after fall with UTI.     Pertinent labs:                      13.0   6.84  )-----------( 216      ( 02 Mar 2021 06:35 )             40.7       03-02    141  |  105  |  16  ----------------------------<  162<H>  3.6   |  24  |  0.84    Ca    8.8      02 Mar 2021 06:35  Phos  3.4     03-02  Mg     2.0     03-02        Patient and Family Aware ? Yes    Malathi Gracia PA-C  Department of Medicine  Pager [phone]

## 2021-03-02 NOTE — PROGRESS NOTE ADULT - SUBJECTIVE AND OBJECTIVE BOX
Date of Service  : 03-02-21 @ 14:00    INTERVAL HPI/OVERNIGHT EVENTS: My right shoulder and arm is hurting.   Vital Signs Last 24 Hrs  T(C): 36.8 (02 Mar 2021 13:16), Max: 37.1 (02 Mar 2021 09:14)  T(F): 98.3 (02 Mar 2021 13:16), Max: 98.8 (02 Mar 2021 09:14)  HR: 89 (02 Mar 2021 13:16) (62 - 89)  BP: 145/62 (02 Mar 2021 13:16) (109/60 - 145/62)  BP(mean): --  RR: 18 (02 Mar 2021 13:16) (16 - 19)  SpO2: 99% (02 Mar 2021 13:16) (99% - 100%)  I&O's Summary    MEDICATIONS  (STANDING):  amLODIPine   Tablet 10 milliGRAM(s) Oral daily  atorvastatin 40 milliGRAM(s) Oral at bedtime  dextrose 40% Gel 15 Gram(s) Oral once  dextrose 5%. 1000 milliLiter(s) (50 mL/Hr) IV Continuous <Continuous>  dextrose 5%. 1000 milliLiter(s) (100 mL/Hr) IV Continuous <Continuous>  dextrose 50% Injectable 25 Gram(s) IV Push once  dextrose 50% Injectable 12.5 Gram(s) IV Push once  dextrose 50% Injectable 25 Gram(s) IV Push once  donepezil 5 milliGRAM(s) Oral at bedtime  enoxaparin Injectable 40 milliGRAM(s) SubCutaneous daily  glucagon  Injectable 1 milliGRAM(s) IntraMuscular once  insulin lispro (ADMELOG) corrective regimen sliding scale   SubCutaneous three times a day before meals  insulin lispro (ADMELOG) corrective regimen sliding scale   SubCutaneous at bedtime  lisinopril 20 milliGRAM(s) Oral daily  sodium chloride 0.9%. 1000 milliLiter(s) (100 mL/Hr) IV Continuous <Continuous>    MEDICATIONS  (PRN):  acetaminophen   Tablet .. 975 milliGRAM(s) Oral every 6 hours PRN Moderate Pain (4 - 6)    LABS:                        13.0   6.84  )-----------( 216      ( 02 Mar 2021 06:35 )             40.7     03-02    141  |  105  |  16  ----------------------------<  162<H>  3.6   |  24  |  0.84    Ca    8.8      02 Mar 2021 06:35  Phos  3.4     03-02  Mg     2.0     03-02          CAPILLARY BLOOD GLUCOSE      POCT Blood Glucose.: 182 mg/dL (02 Mar 2021 11:49)  POCT Blood Glucose.: 145 mg/dL (02 Mar 2021 07:30)  POCT Blood Glucose.: 158 mg/dL (01 Mar 2021 21:22)  POCT Blood Glucose.: 138 mg/dL (01 Mar 2021 16:16)          REVIEW OF SYSTEMS:  CONSTITUTIONAL: No fever, weight loss, or fatigue  EYES: No eye pain, visual disturbances, or discharge  ENMT:  No difficulty hearing, tinnitus, vertigo; No sinus or throat pain  NECK: No pain or stiffness  RESPIRATORY: No cough, wheezing, chills or hemoptysis; No shortness of breath  CARDIOVASCULAR: No chest pain, palpitations, dizziness, or leg swelling  GASTROINTESTINAL: No abdominal or epigastric pain. No nausea, vomiting, or hematemesis; No diarrhea or constipation. No melena or hematochezia.  GENITOURINARY: No dysuria, frequency, hematuria, or incontinence  NEUROLOGICAL: No headaches, memory loss, loss of strength, numbness, or tremors      Consultant(s) Notes Reviewed:  [x ] YES  [ ] NO    PHYSICAL EXAM:  GENERAL: NAD, well-groomed, well-developed, not in any distress ,  HEAD:  Atraumatic, Normocephalic  EYES: EOMI, PERRLA, conjunctiva and sclera clear  ENMT: No tonsillar erythema, exudates, or enlargement; Moist mucous membranes, Good dentition, No lesions  NECK: Supple, No JVD, Normal thyroid  NERVOUS SYSTEM:  Alert & Oriented X3, No focal deficit   CHEST/LUNG: Good air entry bilateral with no  rales, rhonchi, wheezing, or rubs  HEART: Regular rate and rhythm; No murmurs, rubs, or gallops  ABDOMEN: Soft, Nontender, Nondistended; Bowel sounds present  EXTREMITIES:  2+ Peripheral Pulses, No clubbing, cyanosis, or edema      Care Discussed with Consultants/Other Providers [ x] YES  [ ] NO

## 2021-03-02 NOTE — CONSULT NOTE ADULT - SUBJECTIVE AND OBJECTIVE BOX
Patient is a 86y old  Female who presents with a chief complaint of UTI (02 Mar 2021 08:19)      HPI:  Ms Trang Barnett is a 86 year old F with PMH significant for but not limited to HTN, DM2, HDL, dementia, who is presenting to the hospital after fall. Patient states that she has been having suprapubic discomfort and dysuria for 4 days, but did not think much of it. Yesterday states that she was lowering herself into bed and lost consciousness and the next thing that she remembers was that she was on the floor. She did report some right upper chest/ shoulder pain after the fall.  She states that pain was aching sensation, non-radiating, moderate in intensity and was worsened which movement of the right arm. (2021 18:37)      REVIEW OF SYSTEMS:    General:  No wt loss, fevers, chills, night sweats  Eyes:  Good vision, no reported pain  ENT:  No sore throat, pain, runny nose, dysphagia  CV:  No pain, palpitations, hypo/hypertension  Resp:  No dyspnea, cough, tachypnea, wheezing  GI:  No pain, nausea, vomiting, diarrhea, constipation  :  No pain, bleeding, incontinence, nocturia  Muscle:  No pain, weakness  Breast:  No pain, abscess, mass, discharge  Neuro:  No weakness, tingling, memory problems  Psych:  No fatigue, insomnia, mood problems, depression  Endocrine:  No polyuria, polydypsia, cold/heat intolerance  Heme:  No petechiae, ecchymosis, easy bruisability  Skin:  No rash, tattoos, scars, edema    PAST MEDICAL & SURGICAL HISTORY:  GERD (gastroesophageal reflux disease)    DM (diabetes mellitus)    HTN (hypertension)    Disorder of nasal sinus        Allergies    penicillin (Unknown)    Intolerances        MEDICATIONS  (STANDING):  amLODIPine   Tablet 10 milliGRAM(s) Oral daily  atorvastatin 40 milliGRAM(s) Oral at bedtime  dextrose 40% Gel 15 Gram(s) Oral once  dextrose 5%. 1000 milliLiter(s) (50 mL/Hr) IV Continuous <Continuous>  dextrose 5%. 1000 milliLiter(s) (100 mL/Hr) IV Continuous <Continuous>  dextrose 50% Injectable 25 Gram(s) IV Push once  dextrose 50% Injectable 12.5 Gram(s) IV Push once  dextrose 50% Injectable 25 Gram(s) IV Push once  donepezil 5 milliGRAM(s) Oral at bedtime  enoxaparin Injectable 40 milliGRAM(s) SubCutaneous daily  glucagon  Injectable 1 milliGRAM(s) IntraMuscular once  insulin lispro (ADMELOG) corrective regimen sliding scale   SubCutaneous three times a day before meals  insulin lispro (ADMELOG) corrective regimen sliding scale   SubCutaneous at bedtime  levoFLOXacin  Tablet 250 milliGRAM(s) Oral every 24 hours  lisinopril 20 milliGRAM(s) Oral daily  sodium chloride 0.9%. 1000 milliLiter(s) (100 mL/Hr) IV Continuous <Continuous>    MEDICATIONS  (PRN):  acetaminophen   Tablet .. 975 milliGRAM(s) Oral every 6 hours PRN Moderate Pain (4 - 6)        SOCIAL HISTORY:    FAMILY HISTORY:  Family history of heart disease (Mother)          PHYSICAL EXAM:    Vital Signs Last 24 Hrs  T(C): 37.1 (02 Mar 2021 09:14), Max: 37.1 (02 Mar 2021 09:14)  T(F): 98.8 (02 Mar 2021 09:14), Max: 98.8 (02 Mar 2021 09:14)  HR: 87 (02 Mar 2021 09:14) (62 - 87)  BP: 142/71 (02 Mar 2021 09:14) (109/60 - 142/71)  BP(mean): --  RR: 19 (02 Mar 2021 09:14) (16 - 19)  SpO2: 100% (02 Mar 2021 09:14) (99% - 100%)    General:  Appears stated age, well-groomed, well-nourished, no distress  HEENT:  NC/AT, patent nares w/ pink mucosa, OP clear w/o lesions, PERRL, EOMI, conjunctivae clear, no thyromegaly, nodules, adenopathy, no JVD  Chest:  Full & symmetric excursion, no increased effort, breath sounds clear  Cardiovascular:  Regular rhythm, S1, S2, no murmur/rub/S3/S4, no carotid/femoral/abdominal bruit, radial/pedal pulses 2+, no edema  Abdomen:  Soft, non-tender, non-distended, normoactive bowel sounds, no HSM  Extremities:  Gait & station:   Digits:   Nails:   Joints, Bones, Muscles:   ROM:   Stability:  Skin:  No rash/erythema/ecchymoses/petechiae/wounds/abscess/warm/dry  Musculoskeletal:  Full ROM in all joints w/o swelling/tenderness/effusion  Neuro/Psych:  Alert, oriented, normal and symmetric strength in UEs, LEs, normal gait, sensation intact, affect:   mood:   appearance:    LABS:                        13.0   6.84  )-----------( 216      ( 02 Mar 2021 06:35 )             40.7     03-02    141  |  105  |  16  ----------------------------<  162<H>  3.6   |  24  |  0.84    Ca    8.8      02 Mar 2021 06:35  Phos  3.4     03-02  Mg     2.0     03-02        Urinalysis Basic - ( 2021 13:01 )    Color: Yellow / Appearance: Slightly Turbid / S.018 / pH: x  Gluc: x / Ketone: Negative  / Bili: Negative / Urobili: <2 mg/dL   Blood: x / Protein: Trace / Nitrite: Positive   Leuk Esterase: Moderate / RBC: x / WBC x   Sq Epi: x / Non Sq Epi: x / Bacteria: x        RADIOLOGY & ADDITIONAL STUDIES:      ASSESSMENT:      PLAN:        Time spent with the patient:  _____ minutes Patient is a 86y old  Female who presents with a chief complaint of UTI (02 Mar 2021 08:19)      HPI:  Ms Trang Barnett is a 86 year old F with PMH significant for but not limited to HTN, DM2, HDL, dementia, who is presenting to the hospital after fall. Patient states that she has been having suprapubic discomfort and dysuria for 4 days, but did not think much of it. Yesterday states that she was lowering herself into bed and lost consciousness and the next thing that she remembers was that she was on the floor. She did report some right upper chest/ shoulder pain after the fall.  She states that pain was aching sensation, non-radiating, moderate in intensity and was worsened which movement of the right arm. (2021 18:37)    REVIEW OF SYSTEMS:  General:  No wt loss, fevers, chills, night sweats  Eyes:  Good vision, no reported pain  ENT:  No sore throat, pain, runny nose, dysphagia  CV:  No pain, palpitations, hypo/hypertension  Resp:  No dyspnea, cough, tachypnea, wheezing  GI:  No pain, nausea, vomiting, diarrhea, constipation  Muscle:  Right shoulder pain  Neuro:  No weakness, tingling, memory problems    PAST MEDICAL & SURGICAL HISTORY:  GERD (gastroesophageal reflux disease)  DM (diabetes mellitus)  HTN (hypertension)  Disorder of nasal sinus      Allergies  penicillin (Unknown)    MEDICATIONS  (STANDING):  amLODIPine   Tablet 10 milliGRAM(s) Oral daily  atorvastatin 40 milliGRAM(s) Oral at bedtime  dextrose 40% Gel 15 Gram(s) Oral once  dextrose 5%. 1000 milliLiter(s) (50 mL/Hr) IV Continuous <Continuous>  dextrose 5%. 1000 milliLiter(s) (100 mL/Hr) IV Continuous <Continuous>  dextrose 50% Injectable 25 Gram(s) IV Push once  dextrose 50% Injectable 12.5 Gram(s) IV Push once  dextrose 50% Injectable 25 Gram(s) IV Push once  donepezil 5 milliGRAM(s) Oral at bedtime  enoxaparin Injectable 40 milliGRAM(s) SubCutaneous daily  glucagon  Injectable 1 milliGRAM(s) IntraMuscular once  insulin lispro (ADMELOG) corrective regimen sliding scale   SubCutaneous three times a day before meals  insulin lispro (ADMELOG) corrective regimen sliding scale   SubCutaneous at bedtime  levoFLOXacin  Tablet 250 milliGRAM(s) Oral every 24 hours  lisinopril 20 milliGRAM(s) Oral daily  sodium chloride 0.9%. 1000 milliLiter(s) (100 mL/Hr) IV Continuous <Continuous>    MEDICATIONS  (PRN):  acetaminophen   Tablet .. 975 milliGRAM(s) Oral every 6 hours PRN Moderate Pain (4 - 6)    FAMILY HISTORY:  Family history of heart disease (Mother)    PHYSICAL EXAM:    Vital Signs Last 24 Hrs  T(C): 37.1 (02 Mar 2021 09:14), Max: 37.1 (02 Mar 2021 09:14)  T(F): 98.8 (02 Mar 2021 09:14), Max: 98.8 (02 Mar 2021 09:14)  HR: 87 (02 Mar 2021 09:14) (62 - 87)  BP: 142/71 (02 Mar 2021 09:14) (109/60 - 142/71)  BP(mean): --  RR: 19 (02 Mar 2021 09:14) (16 - 19)  SpO2: 100% (02 Mar 2021 09:14) (99% - 100%)    General:  Appears stated age, no distress  HEENT:  NC/AT, EOMI, conjunctivae clear  Chest:  Full & symmetric excursion, no increased effort  Cardiovascular:  Regular rhythm, S1, S2, radial/pedal pulses 2+, no edema  Abdomen:  Soft, non-tender, non-distended    LABS:                        13.0   6.84  )-----------( 216      ( 02 Mar 2021 06:35 )             40.7     03-02    141  |  105  |  16  ----------------------------<  162<H>  3.6   |  24  |  0.84    Ca    8.8      02 Mar 2021 06:35  Phos  3.4     03-02  Mg     2.0     03-02        Urinalysis Basic - ( 2021 13:01 )    Color: Yellow / Appearance: Slightly Turbid / S.018 / pH: x  Gluc: x / Ketone: Negative  / Bili: Negative / Urobili: <2 mg/dL   Blood: x / Protein: Trace / Nitrite: Positive   Leuk Esterase: Moderate / RBC: x / WBC x   Sq Epi: x / Non Sq Epi: x / Bacteria: x        RADIOLOGY & ADDITIONAL STUDIES:  < from: US Thyroid (21 @ 11:15) >    EXAM:  US THYROID ONLY        PROCEDURE DATE:  Mar  1 2021         INTERPRETATION:  CLINICAL INFORMATION: Thyroid nodules.    COMPARISON: None available.    TECHNIQUE: Sonography of the thyroid.    FINDINGS:  Right Lobe: 3.1 x  1.2 x 1.5 cm. Solid hypoechoic nodule in the posterior midpole, measuring 0.8 cm. Solid hypoechoic nodule in the posterior thyroid lobe, measuring 2.3 cm (TI-RAD 4). Solid hypoechoic nodule in the posterior lower pole, measuring 2.8 x 2.9 x 2.3 cm (TI-RAD 3)    Left Lobe: 3.4 x 1.2 x 1.3 cm. Solid hypoechoic nodule in the posterior midpole, measuring 1.0 x 1.0 x 0.9 cm. Cyst, measuring 0.6 x 0.4 x 0.5 cm..    Isthmus: 0.3 cm.    Cervical Lymph Nodes: No enlarged or abnormal morphology cervical nodes.    IMPRESSION:    Bilateral thyroid nodules, including a solid hypoechoic nodule in the right posterior thyroid lobe, measuring 2.3 cm (TI-RAD 4), for which further evaluation with tissue sampling is suggested.    TI-RAD 4: Moderately suspicious (FNA if > 1.5 cm, Follow if > 1 cm)  __________________________________  ACR Thyroid Imaging, Reporting and Data System (TI-RADS): White Paper of the ACR TI-RADS Committee. J Am Liz Radiol 2017;14:587-595.    TI-RAD 1: Benign (No FNA)  TI-RAD 2: Not suspicious (No FNA)  TI-RAD 3: Mildly suspicious (FNA if > 2.5 cm, Follow if >1.5 cm)  TI-RAD 4: Moderately suspicious (FNA if > 1.5 cm, Follow if > 1 cm)  TI-RAD 5: Highly suspicious (FNA if > 1 cm, Follow if > 0.5 cm)              ELIDIA ROBERTS MD; Attending Radiologist  This document has been electronically signed. Mar  1 2021 12:48PM    < end of copied text >

## 2021-03-03 VITALS
TEMPERATURE: 98 F | HEART RATE: 90 BPM | SYSTOLIC BLOOD PRESSURE: 142 MMHG | RESPIRATION RATE: 18 BRPM | OXYGEN SATURATION: 99 % | DIASTOLIC BLOOD PRESSURE: 84 MMHG

## 2021-03-03 LAB
-  AMIKACIN: SIGNIFICANT CHANGE UP
-  AMOXICILLIN/CLAVULANIC ACID: SIGNIFICANT CHANGE UP
-  AMPICILLIN/SULBACTAM: SIGNIFICANT CHANGE UP
-  AMPICILLIN: SIGNIFICANT CHANGE UP
-  AZTREONAM: SIGNIFICANT CHANGE UP
-  CEFAZOLIN: SIGNIFICANT CHANGE UP
-  CEFEPIME: SIGNIFICANT CHANGE UP
-  CEFOXITIN: SIGNIFICANT CHANGE UP
-  CEFTRIAXONE: SIGNIFICANT CHANGE UP
-  CIPROFLOXACIN: SIGNIFICANT CHANGE UP
-  ERTAPENEM: SIGNIFICANT CHANGE UP
-  GENTAMICIN: SIGNIFICANT CHANGE UP
-  IMIPENEM: SIGNIFICANT CHANGE UP
-  LEVOFLOXACIN: SIGNIFICANT CHANGE UP
-  MEROPENEM: SIGNIFICANT CHANGE UP
-  NITROFURANTOIN: SIGNIFICANT CHANGE UP
-  PIPERACILLIN/TAZOBACTAM: SIGNIFICANT CHANGE UP
-  TIGECYCLINE: SIGNIFICANT CHANGE UP
-  TOBRAMYCIN: SIGNIFICANT CHANGE UP
-  TRIMETHOPRIM/SULFAMETHOXAZOLE: SIGNIFICANT CHANGE UP
ANION GAP SERPL CALC-SCNC: 11 MMOL/L — SIGNIFICANT CHANGE UP (ref 7–14)
BUN SERPL-MCNC: 10 MG/DL — SIGNIFICANT CHANGE UP (ref 7–23)
CALCIUM SERPL-MCNC: 9.7 MG/DL — SIGNIFICANT CHANGE UP (ref 8.4–10.5)
CHLORIDE SERPL-SCNC: 103 MMOL/L — SIGNIFICANT CHANGE UP (ref 98–107)
CO2 SERPL-SCNC: 25 MMOL/L — SIGNIFICANT CHANGE UP (ref 22–31)
CREAT SERPL-MCNC: 0.84 MG/DL — SIGNIFICANT CHANGE UP (ref 0.5–1.3)
CULTURE RESULTS: SIGNIFICANT CHANGE UP
GLUCOSE SERPL-MCNC: 173 MG/DL — HIGH (ref 70–99)
HCT VFR BLD CALC: 47.7 % — HIGH (ref 34.5–45)
HGB BLD-MCNC: 15.4 G/DL — SIGNIFICANT CHANGE UP (ref 11.5–15.5)
MAGNESIUM SERPL-MCNC: 2 MG/DL — SIGNIFICANT CHANGE UP (ref 1.6–2.6)
MCHC RBC-ENTMCNC: 29.7 PG — SIGNIFICANT CHANGE UP (ref 27–34)
MCHC RBC-ENTMCNC: 32.3 GM/DL — SIGNIFICANT CHANGE UP (ref 32–36)
MCV RBC AUTO: 91.9 FL — SIGNIFICANT CHANGE UP (ref 80–100)
METHOD TYPE: SIGNIFICANT CHANGE UP
NRBC # BLD: 0 /100 WBCS — SIGNIFICANT CHANGE UP
NRBC # FLD: 0 K/UL — SIGNIFICANT CHANGE UP
ORGANISM # SPEC MICROSCOPIC CNT: SIGNIFICANT CHANGE UP
ORGANISM # SPEC MICROSCOPIC CNT: SIGNIFICANT CHANGE UP
PHOSPHATE SERPL-MCNC: 2.8 MG/DL — SIGNIFICANT CHANGE UP (ref 2.5–4.5)
PLATELET # BLD AUTO: 267 K/UL — SIGNIFICANT CHANGE UP (ref 150–400)
POTASSIUM SERPL-MCNC: 4.2 MMOL/L — SIGNIFICANT CHANGE UP (ref 3.5–5.3)
POTASSIUM SERPL-SCNC: 4.2 MMOL/L — SIGNIFICANT CHANGE UP (ref 3.5–5.3)
RBC # BLD: 5.19 M/UL — SIGNIFICANT CHANGE UP (ref 3.8–5.2)
RBC # FLD: 13.6 % — SIGNIFICANT CHANGE UP (ref 10.3–14.5)
SODIUM SERPL-SCNC: 139 MMOL/L — SIGNIFICANT CHANGE UP (ref 135–145)
SPECIMEN SOURCE: SIGNIFICANT CHANGE UP
WBC # BLD: 9.33 K/UL — SIGNIFICANT CHANGE UP (ref 3.8–10.5)
WBC # FLD AUTO: 9.33 K/UL — SIGNIFICANT CHANGE UP (ref 3.8–10.5)

## 2021-03-03 PROCEDURE — 10005 FNA BX W/US GDN 1ST LES: CPT

## 2021-03-03 PROCEDURE — 88173 CYTOPATH EVAL FNA REPORT: CPT | Mod: 26

## 2021-03-03 PROCEDURE — 99222 1ST HOSP IP/OBS MODERATE 55: CPT

## 2021-03-03 RX ADMIN — Medication 1: at 11:43

## 2021-03-03 RX ADMIN — Medication 1: at 08:11

## 2021-03-03 RX ADMIN — ENOXAPARIN SODIUM 40 MILLIGRAM(S): 100 INJECTION SUBCUTANEOUS at 11:43

## 2021-03-03 NOTE — CONSULT NOTE ADULT - PROBLEM SELECTOR RECOMMENDATION 9
Medicare Wellness Visit  Plan for Preventive Care    A good way for you to stay healthy is to use preventive care. Medicare covers many services that can help you stay healthy. * The goal of these services is to find any health problems as quickly as possible. Finding problems early can help make them easier to treat. Your personal plan below lists the services you may need and when they are due. Health Maintenance Summary     Hepatitis B Vaccine (1 of 3 - Risk 3-dose series)  Overdue since 12/17/1958    Shingles Vaccine (2 of 2)  Overdue since 12/31/2019    Diabetes Foot Exam (Yearly)  Overdue since 7/11/2020    Medicare Wellness Visit (Yearly)  Overdue since 7/11/2020    Influenza Vaccine (1)  Overdue since 9/1/2020    Diabetes A1C (Every 3 Months)  Ordered on 9/1/2020    Diabetes Eye Exam (Yearly)  Next due on 7/14/2021    DM/CKD GFR (Yearly)  Ordered on 9/1/2020    DTaP/Tdap/Td Vaccine (2 - Td)  Next due on 8/15/2023    Pneumococcal Vaccine 65+   Completed    Meningococcal Vaccine   Aged Out    HPV Vaccine   Aged Out           Preventive Care for Women and Men    Heart Screenings (Cardiovascular):  Â· Blood tests are used to check your cholesterol, lipid and triglyceride levels. High levels can increase your risk for heart disease and stroke. High levels can be treated with medications, diet and exercise. Lowering your levels can help keep your heart and blood vessels healthy. Your provider will order these tests if they are needed. Â· An ultrasound is done to see if you have an abdominal aortic aneurysm (AAA). This is an enlargement of one of the main blood vessels that delivers blood to the body. In the [facility], 9,000 deaths are caused by AAA. You may not even know you have this problem and as many as 1 in 3 people will have a serious problem if it is not treated. Early diagnosis allows for more effective treatment and cure.   If you have a family history of AAA or are a male age 70-76 who has
smoked, you are at higher risk of an AAA. Your provider can order this test, if needed. Colorectal Screening:  Â· There are many tests that are used to check for cancer of your colon and rectum. You and your provider should discuss what test is best for you and when to have it done. Options include:  Â· Screening Colonoscopy: exam of the entire colon, seen through a flexible lighted tube. Â· Flexible Sigmoidoscopy: exam of the last third (sigmoid portion) of the colon and rectum, seen through a flexible lighted tube. Â· Cologuard DNA stool test: a sample of your stool is used to screen for cancer and unseen blood in your stool. Â· Fecal Occult Blood Test: a sample of your stool is studied to find any unseen blood    Flu Shot:  Â· An immunization that helps to prevent influenza (the flu). You should get this every year. The best time to get the shot is in the fall. Pneumococcal Shot:  â¢ Vaccines are available that can help prevent pneumococcal disease, which is any type of infection caused by Streptococcus pneumoniae bacteria. Their use can prevent some cases of pneumonia, meningitis, and sepsis. There are two types of pneumococcal vaccines:   o Conjugate vaccines (PCV-13 or Prevnar 13Â®) - helps protect against the 13 types of pneumococcal bacteria that are the most common causes of serious infections in children and adults. o Polysaccharide vaccine (PPSV23 or Xyywqyhmm64Â®) - helps protect against 23 types of pneumococcal bacteria for patients who are recommended to get it. These vaccines should be given at least 12 months apart. A booster is usually not needed. Hepatitis B Shot:  Â· An immunization that helps to protect people from getting Hepatitis B. Hepatitis B is a virus that spreads through contact with infected blood or body fluids. Many people with the virus do not have symptoms. The virus can lead to serious problems, such as liver disease. Some people are at higher risk than others.  Your
doctor will tell you if you need this shot. Diabetes Screening:  Â· A test to measure sugar (glucose) in your blood is called a fasting blood sugar. Fasting means you cannot have food or drink for at least 8 hours before the test. This test can detect diabetes long before you may notice symptoms. Glaucoma Screening:  Â· Glaucoma screening is performed by your eye doctor. The test measures the fluid pressure inside your eyes to determine if you have glaucoma. Hepatitis C Screening:  Â· A blood test to see if you have the hepatitis C virus. Hepatitis C attacks the liver and is a major cause of chronic liver disease. Medicare will cover a single screening for all adults born between [facility], or high risk patients (people who have injected illegal drugs or people who have had blood transfusions). High risk patients who continue to inject illegal drugs can be screened for Hepatitis C every year. Smoking and Tobacco-Use Cessation Counseling:  Â· Tobacco is the single greatest cause of disease and early death in our country today. Medication and counseling together can increase a personâs chance of quitting for good. Â· Medicare covers two quitting attempts per year, with four counseling sessions per attempt (eight sessions in a 12 month period)    Preventive Screening tests for Women    Screening Mammograms and Breast Exams:  Â· An x-ray of your breasts to check for breast cancer before you or your doctor may be able to feel it. If breast cancer is found early it can usually be treated with success. Pelvic Exams and Pap Tests:  Â· An exam to check for cervical and vaginal cancer. A Pap test is a lab test in which cells are taken from your cervix and sent to the lab to look for signs of cervical cancer. If cancer of the cervix is found early, chances for a cure are good. Testing can generally end at age 72, or if a woman has a hysterectomy for a benign condition.  Your provider may recommend more
frequent testing if certain abnormal results are found. Bone Mass Measurements:  Â· A painless x-ray of your bone density to see if you are at risk for a broken bone. Bone density refers to the thickness of bones or how tightly the bone tissue is packed. Preventive Screening tests for Men    Prostate Screening:  Â· Should you have a prostate cancer test (PSA)? It is up to you to decide if you want a prostate cancer test. Talk to your clinician to find out if the test is right for you. Things for you to consider and talk about should include:  Â· Benefits and harms of the test  Â· Your family history  Â· How your race/ethnicity may influence the test  Â· If the test may impact other medical conditions you have  Â· Your values on screenings and treatments    *Medicare pays for many preventive services to keep you healthy. For some of these services, you might have to pay a deductible, coinsurance, and / or copayment. The amounts vary depending on the type of services you need and the kind of Medicare health plan you have.
Ct images were reviewed and are most suspicious for a thyroid mass.  There does not appear to be significant tracheal or esophageal compression.  Interventional Radiology has already done a guided FNA.  Will await these results before formulating a treatment plan.  Since the lesion is nonobstructive she can f/u with me as an outpatient, after D/C.
CT scan Head and Cx Spine, TTE and NST noted. carotid doppler pending. cards helping.

## 2021-03-03 NOTE — PROGRESS NOTE ADULT - SUBJECTIVE AND OBJECTIVE BOX
Cardiovascular Disease Progress Note    Overnight events: No acute events overnight. Ms. Barnett is on her way to IR biopsy. She denies chest pain or SOB.    Otherwise review of systems negative    Objective Findings:  T(C): 36.6 (03-03-21 @ 08:13), Max: 37.2 (03-02-21 @ 17:14)  HR: 88 (03-03-21 @ 08:13) (58 - 90)  BP: 158/88 (03-03-21 @ 08:13) (119/52 - 158/88)  RR: 17 (03-03-21 @ 08:13) (17 - 19)  SpO2: 98% (03-03-21 @ 08:13) (98% - 100%)  Wt(kg): --  Daily     Daily       Physical Exam:  Gen: NAD; Patient resting comfortably  HEENT: EOMI, Normocephalic/ atraumatic  CV: RRR, normal S1 + S2, no m/r/g  Lungs:  Normal respiratory effort; clear to auscultation bilaterally  Abd: soft, non-tender; bowel sounds present  Ext: No edema; warm and well perfused    Telemetry: Sinus; PACs    Laboratory Data:                        15.4   9.33  )-----------( 267      ( 03 Mar 2021 07:22 )             47.7     03-03    139  |  103  |  10  ----------------------------<  173<H>  4.2   |  25  |  0.84    Ca    9.7      03 Mar 2021 07:22  Phos  2.8     03-03  Mg     2.0     03-03                Inpatient Medications:  MEDICATIONS  (STANDING):  amLODIPine   Tablet 10 milliGRAM(s) Oral daily  atorvastatin 40 milliGRAM(s) Oral at bedtime  dextrose 40% Gel 15 Gram(s) Oral once  dextrose 5%. 1000 milliLiter(s) (50 mL/Hr) IV Continuous <Continuous>  dextrose 5%. 1000 milliLiter(s) (100 mL/Hr) IV Continuous <Continuous>  dextrose 50% Injectable 25 Gram(s) IV Push once  dextrose 50% Injectable 12.5 Gram(s) IV Push once  dextrose 50% Injectable 25 Gram(s) IV Push once  donepezil 5 milliGRAM(s) Oral at bedtime  enoxaparin Injectable 40 milliGRAM(s) SubCutaneous daily  glucagon  Injectable 1 milliGRAM(s) IntraMuscular once  insulin lispro (ADMELOG) corrective regimen sliding scale   SubCutaneous three times a day before meals  insulin lispro (ADMELOG) corrective regimen sliding scale   SubCutaneous at bedtime  lisinopril 20 milliGRAM(s) Oral daily  sodium chloride 0.9%. 1000 milliLiter(s) (100 mL/Hr) IV Continuous <Continuous>      Assessment: 86 year old woman with HTN, NIDDM, HLD and dementia presents with syncope found to have UTI.     Plan of Care:    #Syncope-  Secondary to UTI.  EKG is sinus with a pre-existing RBBB.   TTE and nuclear stress WNL.    #Sinus with premature atrial complexes-  Telemetry reviewed- no evidence of AV block.  Continue current cardiac management.     #HTN-  BP acceptable on current regimen.    #Thyroid mass-  Thyroid US noted.  Will plan for inpatient biopsy and surgical evaluation given size and associated neck discomfort.           Over 25 minutes spent on total encounter; more than 50% of the visit was spent counseling and/or coordinating care by the attending physician.      David Mackay MD [facility]  Cardiovascular Disease  [phone]

## 2021-03-03 NOTE — PROGRESS NOTE ADULT - ASSESSMENT
86 year old F with PMH significant for but not limited to HTN, DM2, HDL, dementia, who is presenting to the hospital after fall. Patient states that she has been having suprapubic discomfort and dysuria for 4 days, but did not think much of it. Yesterday states that she was lowering herself into bed and lost consciousness and the next thing that she remembers was that she was on the floor. She did report some right upper chest/ shoulder pain after the fall.  She states that pain was aching sensation, non-radiating, moderate in intensity and was worsened which movement of the right arm.      Problem/Recommendation - 1:  Problem: Syncope and collapse. Recommendation: CT scan Head and Cx Spine, TTE and NST noted.   Carotid doppler noted.   Cards helping.     Problem/Recommendation - 2:  ·  Problem: Chest pain.  Recommendation: TTE and NST noted.      Problem/Recommendation - 3:  ·  Problem: DM (diabetes mellitus).  Recommendation: Sugars in good range.      Problem/Recommendation - 4:  ·  Problem: HTN (hypertension).  Recommendation: BP meds with hold parameters.      Problem/Recommendation - 5:  ·  Problem: UTI (urinary tract infection).  Recommendation: Abxs. Culture noted.      Problem/Recommendation - 6:  Problem: Thyroid nodule. Recommendation: Needs Bx so will consult IR.     Problem/Recommendation - 7:  Problem: Right shoulder pain radiating down right arm . Recommendation: X Ray right shoulder and CT cervical spine noted. Pain meds .    CT scan of soft tissue of neck noted.   D/W ENT attending and will follow pt outpt with Bx results.

## 2021-03-03 NOTE — PRE PROCEDURE NOTE - PRE PROCEDURE EVALUATION
------------------------------------------------------------  Interventional Radiology Pre-Procedure Note  ------------------------------------------------------------    Indication: 86y Female with right thyroid nodule suspicious for malignancy    Past Medical History:  GERD (gastroesophageal reflux disease)    DM (diabetes mellitus)    HTN (hypertension)    No pertinent past medical history        Allergies: penicillin (Unknown)      Medications:  amLODIPine   Tablet: 10 milliGRAM(s) Oral (03-02-21 @ 05:11)  enoxaparin Injectable: 40 milliGRAM(s) SubCutaneous (03-02-21 @ 12:07)  levoFLOXacin  Tablet: 250 milliGRAM(s) Oral (03-02-21 @ 12:08)  lisinopril: 20 milliGRAM(s) Oral (03-02-21 @ 05:11)      Vital Signs:   T(F): 97.9 (08:13), Max: 99 (17:14)  HR: 88 (08:13)  BP: 158/88 (08:13)  RR: 17 (08:13)  SpO2: 98% (08:13)    Labs:           15.4  9.33)-----(267     (03-03-21 @ 07:22)         47.7     139 | 103 | 10  --------------------< 173     (03-03-21 @ 07:22)  4.2 | 25 | 0.84       Imaging:  < from: US Thyroid (03.01.21 @ 11:15) >  XAM:  US THYROID ONLY        PROCEDURE DATE:  Mar  1 2021         INTERPRETATION:  CLINICAL INFORMATION: Thyroid nodules.    COMPARISON: None available.    TECHNIQUE: Sonography of the thyroid.    FINDINGS:  Right Lobe: 3.1 x  1.2 x 1.5 cm. Solid hypoechoic nodule in the posterior midpole, measuring 0.8 cm. Solid hypoechoic nodule in the posterior thyroid lobe, measuring 2.3 cm (TI-RAD 4). Solid hypoechoic nodule in the posterior lower pole, measuring 2.8 x 2.9 x 2.3 cm (TI-RAD 3)    Left Lobe: 3.4 x 1.2 x 1.3 cm. Solid hypoechoic nodule in the posterior midpole, measuring 1.0 x 1.0 x 0.9 cm. Cyst, measuring 0.6 x 0.4 x 0.5 cm..    Isthmus: 0.3 cm.    Cervical Lymph Nodes: No enlarged or abnormal morphology cervical nodes.    IMPRESSION:    Bilateral thyroid nodules, including a solid hypoechoic nodule in the right posterior thyroid lobe, measuring 2.3 cm (TI-RAD 4), for which further evaluation with tissue sampling is suggested.    TI-RAD 4: Moderately suspicious (FNA if > 1.5 cm, Follow if > 1 cm)  __________________________________  ACR Thyroid Imaging, Reporting and Data System (TI-RADS): White Paper of the ACR TI-RADS Committee. J Am Liz Radiol 2017;14:587-595.    TI-RAD 1: Benign (No FNA)  TI-RAD 2: Not suspicious (No FNA)  TI-RAD 3: Mildly suspicious (FNA if > 2.5 cm, Follow if >1.5 cm)  TI-RAD 4: Moderately suspicious (FNA if > 1.5 cm, Follow if > 1 cm)  TI-RAD 5: Highly suspicious (FNA if > 1 cm, Follow if > 0.5 cm)              ELIDIA ROBERTS MD; Attending Radiologist  This document has been electronically signed. Mar  1 2021 12:48PM    < end of copied text >      Consent: Risk, benefits, and alternatives to proc discussed with patient and informed consent obtained    Procedure Plan: Thyroid biopsy

## 2021-03-03 NOTE — PROGRESS NOTE ADULT - SUBJECTIVE AND OBJECTIVE BOX
Date of Service  : 03-03-21 @ 13:34    INTERVAL HPI/OVERNIGHT EVENTS: i feel fine.   Vital Signs Last 24 Hrs  T(C): 36.7 (03 Mar 2021 12:26), Max: 37.2 (02 Mar 2021 17:14)  T(F): 98 (03 Mar 2021 12:26), Max: 99 (02 Mar 2021 17:14)  HR: 90 (03 Mar 2021 12:26) (58 - 90)  BP: 142/84 (03 Mar 2021 12:26) (119/52 - 158/88)  BP(mean): --  RR: 18 (03 Mar 2021 12:26) (17 - 19)  SpO2: 99% (03 Mar 2021 12:26) (98% - 100%)  I&O's Summary    MEDICATIONS  (STANDING):  amLODIPine   Tablet 10 milliGRAM(s) Oral daily  atorvastatin 40 milliGRAM(s) Oral at bedtime  dextrose 40% Gel 15 Gram(s) Oral once  dextrose 5%. 1000 milliLiter(s) (50 mL/Hr) IV Continuous <Continuous>  dextrose 5%. 1000 milliLiter(s) (100 mL/Hr) IV Continuous <Continuous>  dextrose 50% Injectable 25 Gram(s) IV Push once  dextrose 50% Injectable 12.5 Gram(s) IV Push once  dextrose 50% Injectable 25 Gram(s) IV Push once  donepezil 5 milliGRAM(s) Oral at bedtime  enoxaparin Injectable 40 milliGRAM(s) SubCutaneous daily  glucagon  Injectable 1 milliGRAM(s) IntraMuscular once  insulin lispro (ADMELOG) corrective regimen sliding scale   SubCutaneous three times a day before meals  insulin lispro (ADMELOG) corrective regimen sliding scale   SubCutaneous at bedtime  lisinopril 20 milliGRAM(s) Oral daily  sodium chloride 0.9%. 1000 milliLiter(s) (100 mL/Hr) IV Continuous <Continuous>    MEDICATIONS  (PRN):  acetaminophen   Tablet .. 975 milliGRAM(s) Oral every 6 hours PRN Moderate Pain (4 - 6)    LABS:                        15.4   9.33  )-----------( 267      ( 03 Mar 2021 07:22 )             47.7     03-03    139  |  103  |  10  ----------------------------<  173<H>  4.2   |  25  |  0.84    Ca    9.7      03 Mar 2021 07:22  Phos  2.8     03-03  Mg     2.0     03-03          CAPILLARY BLOOD GLUCOSE      POCT Blood Glucose.: 198 mg/dL (03 Mar 2021 11:40)  POCT Blood Glucose.: 162 mg/dL (03 Mar 2021 07:45)  POCT Blood Glucose.: 131 mg/dL (02 Mar 2021 22:57)  POCT Blood Glucose.: 148 mg/dL (02 Mar 2021 16:50)          REVIEW OF SYSTEMS:  CONSTITUTIONAL: No fever, weight loss, or fatigue  EYES: No eye pain, visual disturbances, or discharge  ENMT:  No difficulty hearing, tinnitus, vertigo; No sinus or throat pain  NECK: No pain or stiffness  RESPIRATORY: No cough, wheezing, chills or hemoptysis; No shortness of breath  CARDIOVASCULAR: No chest pain, palpitations, dizziness, or leg swelling  GASTROINTESTINAL: No abdominal or epigastric pain. No nausea, vomiting, or hematemesis; No diarrhea or constipation. No melena or hematochezia.  GENITOURINARY: No dysuria, frequency, hematuria, or incontinence  NEUROLOGICAL: No headaches, memory loss, loss of strength, numbness, or tremors      Consultant(s) Notes Reviewed:  [x ] YES  [ ] NO    PHYSICAL EXAM:  GENERAL: NAD, well-groomed, well-developed ,not in any distress ,  HEAD:  Atraumatic, Normocephalic  EYES: EOMI, PERRLA, conjunctiva and sclera clear  ENMT: No tonsillar erythema, exudates, or enlargement; Moist mucous membranes, Good dentition, No lesions  NECK: Supple, No JVD, Normal thyroid  NERVOUS SYSTEM:  Alert & Oriented X3, No focal deficit   CHEST/LUNG: Good air entry bilateral with no  rales, rhonchi, wheezing, or rubs  HEART: Regular rate and rhythm; No murmurs, rubs, or gallops  ABDOMEN: Soft, Nontender, Nondistended; Bowel sounds present  EXTREMITIES:  2+ Peripheral Pulses, No clubbing, cyanosis, or edema  SKIN: No rashes or lesions    Care Discussed with Consultants/Other Providers [ x] YES  [ ] NO

## 2021-03-03 NOTE — DISCHARGE NOTE NURSING/CASE MANAGEMENT/SOCIAL WORK - PATIENT PORTAL LINK FT
You can access the FollowMyHealth Patient Portal offered by [facility] by registering at the following website: [email or web address]. By joining [employer]’s FollowMyHealth portal, you will also be able to view your health information using other applications (apps) compatible with our system.

## 2021-03-03 NOTE — PROVIDER CONTACT NOTE (OTHER) - ACTION/TREATMENT ORDERED:
ACP notified. ACP notified. ACP reviewed strips with tele tech and RN. Patient is NSR with PAC activity.

## 2021-03-03 NOTE — PROVIDER CONTACT NOTE (OTHER) - SITUATION
RN notified by tele tech that patient had brief non-sustaining 2nd degree AV block RN notified by tele tech that patient had brief intermittent non-sustaining 2nd degree AV block type 2.

## 2021-03-03 NOTE — CONSULT NOTE ADULT - SUBJECTIVE AND OBJECTIVE BOX
Patient is a 86y old  Female who presents with a chief complaint of UTI (03 Mar 2021 09:01)      HPI:  Ms Trang Barnett is a 86 year old F with PMH significant for but not limited to HTN, DM2, HDL, dementia, who is presenting to the hospital after fall. Patient states that she has been having suprapubic discomfort and dysuria for 4 days, but did not think much of it. Yesterday states that she was lowering herself into bed and lost consciousness and the next thing that she remembers was that she was on the floor. She did report some right upper chest/ shoulder pain after the fall.  She states that pain was aching sensation, non-radiating, moderate in intensity and was worsened which movement of the right arm. (28 Feb 2021 18:37)    Interval Events: Due to fall pt had a neck CT that revealed an incidental finding of a R tracheoesophageal groove mass, possibly coming off the thyroid or the esophagus.  Head and Neck surgery evaluation requested.    REVIEW OF SYSTEMS:    CONSTITUTIONAL: No weakness, fevers or chills  EYES/ENT: No visual changes;  No vertigo or throat pain   NECK: No pain or stiffness  RESPIRATORY: No cough, wheezing, hemoptysis; No shortness of breath  CARDIOVASCULAR: No chest pain or palpitations  GASTROINTESTINAL: No abdominal or epigastric pain. No nausea, vomiting, or hematemesis; No diarrhea or constipation. No melena or hematochezia.  GENITOURINARY: No dysuria, frequency or hematuria  NEUROLOGICAL: No numbness or weakness  SKIN: No itching, burning, rashes, or lesions   All other review of systems is negative unless indicated above.    MEDICATIONS  (STANDING):  amLODIPine   Tablet 10 milliGRAM(s) Oral daily  atorvastatin 40 milliGRAM(s) Oral at bedtime  dextrose 40% Gel 15 Gram(s) Oral once  dextrose 5%. 1000 milliLiter(s) (50 mL/Hr) IV Continuous <Continuous>  dextrose 5%. 1000 milliLiter(s) (100 mL/Hr) IV Continuous <Continuous>  dextrose 50% Injectable 25 Gram(s) IV Push once  dextrose 50% Injectable 12.5 Gram(s) IV Push once  dextrose 50% Injectable 25 Gram(s) IV Push once  donepezil 5 milliGRAM(s) Oral at bedtime  enoxaparin Injectable 40 milliGRAM(s) SubCutaneous daily  glucagon  Injectable 1 milliGRAM(s) IntraMuscular once  insulin lispro (ADMELOG) corrective regimen sliding scale   SubCutaneous three times a day before meals  insulin lispro (ADMELOG) corrective regimen sliding scale   SubCutaneous at bedtime  lisinopril 20 milliGRAM(s) Oral daily  sodium chloride 0.9%. 1000 milliLiter(s) (100 mL/Hr) IV Continuous <Continuous>    MEDICATIONS  (PRN):  acetaminophen   Tablet .. 975 milliGRAM(s) Oral every 6 hours PRN Moderate Pain (4 - 6)                            15.4   9.33  )-----------( 267      ( 03 Mar 2021 07:22 )             47.7     03-03    139  |  103  |  10  ----------------------------<  173<H>  4.2   |  25  |  0.84    Ca    9.7      03 Mar 2021 07:22  Phos  2.8     03-03  Mg     2.0     03-03      I&O's Detail    Vital Signs Last 24 Hrs  T(C): 36.6 (03 Mar 2021 08:13), Max: 37.2 (02 Mar 2021 17:14)  T(F): 97.9 (03 Mar 2021 08:13), Max: 99 (02 Mar 2021 17:14)  HR: 88 (03 Mar 2021 08:13) (58 - 90)  BP: 158/88 (03 Mar 2021 08:13) (119/52 - 158/88)  BP(mean): --  RR: 17 (03 Mar 2021 08:13) (17 - 19)  SpO2: 98% (03 Mar 2021 08:13) (98% - 100%)    CBC Full  -  ( 03 Mar 2021 07:22 )  WBC Count : 9.33 K/uL  RBC Count : 5.19 M/uL  Hemoglobin : 15.4 g/dL  Hematocrit : 47.7 %  Platelet Count - Automated : 267 K/uL  Mean Cell Volume : 91.9 fL  Mean Cell Hemoglobin : 29.7 pg  Mean Cell Hemoglobin Concentration : 32.3 gm/dL  Auto Neutrophil # : x  Auto Lymphocyte # : x  Auto Monocyte # : x  Auto Eosinophil # : x  Auto Basophil # : x  Auto Neutrophil % : x  Auto Lymphocyte % : x  Auto Monocyte % : x  Auto Eosinophil % : x  Auto Basophil % : x      EXAM: CT NECK SOFT TISSUE    PROCEDURE DATE: Mar 2 2021    INTERPRETATION: INDICATIONS: Status post fall with neck pain    TECHNIQUE: Axial images were obtained without the use of intravenous contrast material. Sagittal and coronal reformatted images were obtained.    COMPARISON EXAMINATION: Cervical spine CT 2/27/2020 and neck ultrasound 3/1/2021    FINDINGS:    There is a soft tissue mass inseparable from the posterior margin of the right thyroid lower pole anteriorly and the esophagus medially. The mass is mildly hypodense to the thyroid gland and measures approximately 3.1 x 2.8 x 4.3 cm. there is associated leftward displacement of the trachea with mild tracheal compression. The nodule extends approximately 1 cm below the sternum.    AERODIGESTIVE TRACT: Otherwise intrinsically normal noncontrast appearance.  LYMPH NODES: No adenopathy is seen although detection is hampered by lack of contrast.  PAROTID GLANDS: Normal non-contrast appearance.  SUBMANDIBULAR GLANDS: Normal non-contrast appearance.  THYROID GLAND: Otherwise unremarkable noncontrast appearance  VISUALIZED PARANASAL SINUSES: Mild left maxillary mucosal thickening with evidence of prior San-Axel procedure.  VISUALIZED TYMPANOMASTOID CAVITIES: Clear.  BONES: Cervical spondylosis. The patient is edentulous.  MISCELLANEOUS: Intraocular lens implants    IMPRESSION: Soft tissue mass inseparable from the posterior margin of the right thyroid lower pole which may be of thyroid, esophageal or parathyroid origin. The possibility of an enlarged lymph node is not entirely excluded. Tissue sampling advised for further evaluation.    No adenopathy otherwise identified on this noncontrast exam.    FRANCOISE BARLOW MD; Attending Radiologist  This document has been electronically signed. Mar 2 2021 9:06PM        PHYSICAL EXAM:  Constitutional: Alert and oriented.      Psychiatric: age appropriate behavior, cooperative    Skin: no obvious skin lesions    Lymphatic: no cervical lymphadenopathy    Ears: WNL externally		    External Nose:  Normal, no structural deformities  		  Anterior Nasal Cavity:	Normal mucosa, no turbinate hypertrophy, straight septum    Neck: No palpable lymphadenopathy    Pulmonary: No Acute Distress.     Neurologic: awake and alert

## 2021-08-14 ENCOUNTER — EMERGENCY (EMERGENCY)
Facility: HOSPITAL | Age: 87
LOS: 1 days | Discharge: HOME | End: 2021-08-14
Attending: EMERGENCY MEDICINE | Admitting: EMERGENCY MEDICINE
Payer: MEDICARE

## 2021-08-14 VITALS
OXYGEN SATURATION: 100 % | TEMPERATURE: 98 F | SYSTOLIC BLOOD PRESSURE: 141 MMHG | HEIGHT: 60 IN | RESPIRATION RATE: 17 BRPM | HEART RATE: 96 BPM | DIASTOLIC BLOOD PRESSURE: 73 MMHG

## 2021-08-14 VITALS
OXYGEN SATURATION: 100 % | SYSTOLIC BLOOD PRESSURE: 137 MMHG | DIASTOLIC BLOOD PRESSURE: 74 MMHG | HEART RATE: 88 BPM | RESPIRATION RATE: 16 BRPM

## 2021-08-14 DIAGNOSIS — J34.9 UNSPECIFIED DISORDER OF NOSE AND NASAL SINUSES: Chronic | ICD-10-CM

## 2021-08-14 LAB
ANION GAP SERPL CALC-SCNC: 12 MMOL/L — SIGNIFICANT CHANGE UP (ref 7–14)
BUN SERPL-MCNC: 12 MG/DL — SIGNIFICANT CHANGE UP (ref 7–23)
CALCIUM SERPL-MCNC: 9.5 MG/DL — SIGNIFICANT CHANGE UP (ref 8.4–10.5)
CHLORIDE SERPL-SCNC: 102 MMOL/L — SIGNIFICANT CHANGE UP (ref 98–107)
CO2 SERPL-SCNC: 27 MMOL/L — SIGNIFICANT CHANGE UP (ref 22–31)
CREAT SERPL-MCNC: 0.85 MG/DL — SIGNIFICANT CHANGE UP (ref 0.5–1.3)
GLUCOSE SERPL-MCNC: 213 MG/DL — HIGH (ref 70–99)
POTASSIUM SERPL-MCNC: 3.4 MMOL/L — LOW (ref 3.5–5.3)
POTASSIUM SERPL-SCNC: 3.4 MMOL/L — LOW (ref 3.5–5.3)
SODIUM SERPL-SCNC: 141 MMOL/L — SIGNIFICANT CHANGE UP (ref 135–145)

## 2021-08-14 PROCEDURE — 99284 EMERGENCY DEPT VISIT MOD MDM: CPT

## 2021-08-14 NOTE — ED PROVIDER NOTE - NS ED ROS FT
CONST: no fevers, no chills  ENT: no sore throat  CV: no chest pain, no leg swelling  RESP: no shortness of breath, no cough  ABD: no abdominal pain, no nausea, no vomiting, no diarrhea  : no dysuria, no flank pain, no hematuria  MSK: no back pain, no extremity pain  NEURO: no headache or additional neurologic complaints  SKIN:  no rash

## 2021-08-14 NOTE — ED PROVIDER NOTE - ATTENDING CONTRIBUTION TO CARE
DR. BLOCH, ATTENDING MD-  I performed a face to face bedside interview with patient regarding history of present illness, review of symptoms and past medical history. I completed an independent physical exam.  I have discussed patient's plan of care with the resident.  Patient alert oriented, denies symptoms, heent nml heart s1s2, lungs clear, abd soft nontender, neuro motor and sensory intact, no edema, pulses present bilaterally.

## 2021-08-14 NOTE — ED PROVIDER NOTE - NSFOLLOWUPINSTRUCTIONS_ED_ALL_ED_FT
Follow up with your primary care doctor within the next 48-72 hours. Continue to take your medications as prescribed by your doctor previously    Return to the Emergency Department if there are any new or worsening symptoms

## 2021-08-14 NOTE — ED ADULT NURSE NOTE - OBJECTIVE STATEMENT
86 y/o female with hx of dementia presents to ED with hyperglycemia.  Per family they checked her blood sugar at home and thought that it was high.  Pt c/o chronic right shoulder pain, offers no other complaints.  Labs drawn as per order.  Providers evaluating

## 2021-08-14 NOTE — ED PROVIDER NOTE - PHYSICAL EXAMINATION
Physical Exam:  Gen: NAD, non-toxic appearing, awake alert   HEENT: EOMI, PEERL, normal conjunctiva, oral mucosa moist  Lung: CTAB, no respiratory distress, no wheezes/rhonchi/rales B/L, speaking in full sentences  CV: RRR, no murmurs, rubs or gallops  Abd: soft, NT, ND, no guarding, no rigidity, no rebound tenderness  MSK: no visible deformities  Neuro: No focal sensory or motor deficits  Skin: Warm, well perfused, no rash, no leg swelling  Psych: normal affect, calm  ~Sai Barriga MD (PGY-2)

## 2021-08-14 NOTE — ED PROVIDER NOTE - OBJECTIVE STATEMENT
87F PMH HTN, T2DM, HLD< dementia presenting by EMS for presumed hyperglycemia at home. Pt was at home with granddaughter and checked f/s per usual. She states she was not feeling 87F PMH HTN, T2DM, HLD< dementia presenting by EMS for presumed hyperglycemia at home. Pt was at home with granddaughter and checked f/s per usual. She states she was not feeling any abnormal symptoms but her f/s read 600. Spoke with pt's daughter who told her to check it again but pt got anxious and called EMS. EMS's F/S was 220. In ED, pt's fingerstick was 219. She denied every feeling dizzy or lightheaded, and pt's daughter confirmed this. No fevers/chills, chest pain, SOB, abdominal pain, urinary symptoms, leg swelling, head trauma. Lives at home with daughter. Ambulatory with cane. On phone, daughter states pt is non-compliant with her metformin; however she regularly takes her januvia

## 2021-08-14 NOTE — ED PROVIDER NOTE - PATIENT PORTAL LINK FT
You can access the FollowMyHealth Patient Portal offered by [facility] by registering at the following website: [email or web address]. By joining [employer]’s FollowMyHealth portal, you will also be able to view your health information using other applications (apps) compatible with our system.

## 2021-08-14 NOTE — ED PROVIDER NOTE - CLINICAL SUMMARY MEDICAL DECISION MAKING FREE TEXT BOX
87F presenting after thinking she had a hyperglycemia reading at home, but remained asymptomatic. F/S at ED and by EMS was low 200's. VSS in ED. No grossly abnormal findings on exam  Will check bmp to evaluate glc level and likely d/c to home

## 2021-08-14 NOTE — ED ADULT NURSE NOTE - NSIMPLEMENTINTERV_GEN_ALL_ED
Implemented All Fall Risk Interventions:  [place] to call system. Call bell, personal items and telephone within reach. Instruct patient to call for assistance. Room bathroom lighting operational. Non-slip footwear when patient is off stretcher. Physically safe environment: no spills, clutter or unnecessary equipment. Stretcher in lowest position, wheels locked, appropriate side rails in place. Provide visual cue, wrist band, yellow gown, etc. Monitor gait and stability. Monitor for mental status changes and reorient to person, place, and time. Review medications for side effects contributing to fall risk. Reinforce activity limits and safety measures with patient and family.

## 2021-08-14 NOTE — ED PROVIDER NOTE - PROGRESS NOTE DETAILS
Linus HELMS (PGY-2)   Pt reevaluated and found to have no new or worsening symptoms. Explained results of w/u to pt. They understand the need to f/u with their PCP and/or specialist for further evaluation. They also understand return precautions for returning to the ED. Will d/c to home  Pt called her daughter who will  pt from ED

## 2022-06-22 NOTE — ED PROVIDER NOTE - PMH
PRE-OP DIAGNOSIS:  Osteoarthritis of right knee 22-Jun-2022 14:46:23  Jose Manuel Davis  
DM (diabetes mellitus)    GERD (gastroesophageal reflux disease)    HTN (hypertension)

## 2022-08-10 NOTE — DISCHARGE NOTE NURSING/CASE MANAGEMENT/SOCIAL WORK - NSFLUVACAGEDISCH_IMM_ALL_CORE

## 2022-08-23 NOTE — ED PROVIDER NOTE - CPE EDP CARDIAC NORM
Abdomen, soft, nontender, nondistended, no guarding or rigidity, no masses palpable, normal bowel sounds
normal...

## 2022-10-18 NOTE — H&P ADULT - PROBLEM SELECTOR PROBLEM 6
Patient developed rash on face and mother took patient to PCP's office, negative for strep, PCP took stool samples and mother will call with results, mother inquiring if OK to give patient pepcid twice daily, confirmed OK to give pepcid twice daily. Hepatic steatosis Chronic bilateral low back pain without sciatica

## 2022-11-05 ENCOUNTER — EMERGENCY (EMERGENCY)
Facility: HOSPITAL | Age: 88
LOS: 1 days | Discharge: HOME | End: 2022-11-05
Attending: STUDENT IN AN ORGANIZED HEALTH CARE EDUCATION/TRAINING PROGRAM | Admitting: STUDENT IN AN ORGANIZED HEALTH CARE EDUCATION/TRAINING PROGRAM

## 2022-11-05 VITALS
TEMPERATURE: 102 F | SYSTOLIC BLOOD PRESSURE: 122 MMHG | DIASTOLIC BLOOD PRESSURE: 63 MMHG | RESPIRATION RATE: 16 BRPM | OXYGEN SATURATION: 97 % | HEART RATE: 100 BPM

## 2022-11-05 VITALS
SYSTOLIC BLOOD PRESSURE: 125 MMHG | TEMPERATURE: 99 F | OXYGEN SATURATION: 100 % | HEART RATE: 75 BPM | RESPIRATION RATE: 18 BRPM

## 2022-11-05 DIAGNOSIS — J34.9 UNSPECIFIED DISORDER OF NOSE AND NASAL SINUSES: Chronic | ICD-10-CM

## 2022-11-05 LAB
ALBUMIN SERPL ELPH-MCNC: 4 G/DL — SIGNIFICANT CHANGE UP (ref 3.3–5)
ALP SERPL-CCNC: 91 U/L — SIGNIFICANT CHANGE UP (ref 40–120)
ALT FLD-CCNC: 16 U/L — SIGNIFICANT CHANGE UP (ref 4–33)
ANION GAP SERPL CALC-SCNC: 10 MMOL/L — SIGNIFICANT CHANGE UP (ref 7–14)
APPEARANCE UR: CLEAR — SIGNIFICANT CHANGE UP
AST SERPL-CCNC: 21 U/L — SIGNIFICANT CHANGE UP (ref 4–32)
B PERT DNA SPEC QL NAA+PROBE: SIGNIFICANT CHANGE UP
B PERT+PARAPERT DNA PNL SPEC NAA+PROBE: SIGNIFICANT CHANGE UP
BACTERIA # UR AUTO: ABNORMAL
BASE EXCESS BLDV CALC-SCNC: 5 MMOL/L — HIGH (ref -2–3)
BASOPHILS # BLD AUTO: 0.03 K/UL — SIGNIFICANT CHANGE UP (ref 0–0.2)
BASOPHILS NFR BLD AUTO: 0.4 % — SIGNIFICANT CHANGE UP (ref 0–2)
BILIRUB SERPL-MCNC: 0.4 MG/DL — SIGNIFICANT CHANGE UP (ref 0.2–1.2)
BILIRUB UR-MCNC: NEGATIVE — SIGNIFICANT CHANGE UP
BLOOD GAS VENOUS COMPREHENSIVE RESULT: SIGNIFICANT CHANGE UP
BORDETELLA PARAPERTUSSIS (RAPRVP): SIGNIFICANT CHANGE UP
BUN SERPL-MCNC: 14 MG/DL — SIGNIFICANT CHANGE UP (ref 7–23)
C PNEUM DNA SPEC QL NAA+PROBE: SIGNIFICANT CHANGE UP
CALCIUM SERPL-MCNC: 9.1 MG/DL — SIGNIFICANT CHANGE UP (ref 8.4–10.5)
CHLORIDE BLDV-SCNC: 102 MMOL/L — SIGNIFICANT CHANGE UP (ref 96–108)
CHLORIDE SERPL-SCNC: 100 MMOL/L — SIGNIFICANT CHANGE UP (ref 98–107)
CO2 BLDV-SCNC: 32.5 MMOL/L — HIGH (ref 22–26)
CO2 SERPL-SCNC: 27 MMOL/L — SIGNIFICANT CHANGE UP (ref 22–31)
COLOR SPEC: COLORLESS — SIGNIFICANT CHANGE UP
CREAT SERPL-MCNC: 1.02 MG/DL — SIGNIFICANT CHANGE UP (ref 0.5–1.3)
DIFF PNL FLD: NEGATIVE — SIGNIFICANT CHANGE UP
EGFR: 53 ML/MIN/1.73M2 — LOW
EOSINOPHIL # BLD AUTO: 0.08 K/UL — SIGNIFICANT CHANGE UP (ref 0–0.5)
EOSINOPHIL NFR BLD AUTO: 1.1 % — SIGNIFICANT CHANGE UP (ref 0–6)
EPI CELLS # UR: 1 /HPF — SIGNIFICANT CHANGE UP (ref 0–5)
FLUAV H1 2009 PAND RNA SPEC QL NAA+PROBE: DETECTED
FLUBV RNA SPEC QL NAA+PROBE: SIGNIFICANT CHANGE UP
GAS PNL BLDV: 134 MMOL/L — LOW (ref 136–145)
GAS PNL BLDV: SIGNIFICANT CHANGE UP
GLUCOSE BLDV-MCNC: 171 MG/DL — HIGH (ref 70–99)
GLUCOSE SERPL-MCNC: 157 MG/DL — HIGH (ref 70–99)
GLUCOSE UR QL: NEGATIVE — SIGNIFICANT CHANGE UP
HADV DNA SPEC QL NAA+PROBE: SIGNIFICANT CHANGE UP
HCO3 BLDV-SCNC: 31 MMOL/L — HIGH (ref 22–29)
HCOV 229E RNA SPEC QL NAA+PROBE: SIGNIFICANT CHANGE UP
HCOV HKU1 RNA SPEC QL NAA+PROBE: SIGNIFICANT CHANGE UP
HCOV NL63 RNA SPEC QL NAA+PROBE: SIGNIFICANT CHANGE UP
HCOV OC43 RNA SPEC QL NAA+PROBE: SIGNIFICANT CHANGE UP
HCT VFR BLD CALC: 41.4 % — SIGNIFICANT CHANGE UP (ref 34.5–45)
HCT VFR BLDA CALC: 42 % — SIGNIFICANT CHANGE UP (ref 34.5–46.5)
HGB BLD CALC-MCNC: 13.9 G/DL — SIGNIFICANT CHANGE UP (ref 11.5–15.5)
HGB BLD-MCNC: 13.3 G/DL — SIGNIFICANT CHANGE UP (ref 11.5–15.5)
HMPV RNA SPEC QL NAA+PROBE: SIGNIFICANT CHANGE UP
HPIV1 RNA SPEC QL NAA+PROBE: SIGNIFICANT CHANGE UP
HPIV2 RNA SPEC QL NAA+PROBE: SIGNIFICANT CHANGE UP
HPIV3 RNA SPEC QL NAA+PROBE: SIGNIFICANT CHANGE UP
HPIV4 RNA SPEC QL NAA+PROBE: SIGNIFICANT CHANGE UP
IANC: 5.75 K/UL — SIGNIFICANT CHANGE UP (ref 1.8–7.4)
IMM GRANULOCYTES NFR BLD AUTO: 0.5 % — SIGNIFICANT CHANGE UP (ref 0–0.9)
KETONES UR-MCNC: NEGATIVE — SIGNIFICANT CHANGE UP
LACTATE BLDV-MCNC: 1.9 MMOL/L — SIGNIFICANT CHANGE UP (ref 0.5–2)
LEUKOCYTE ESTERASE UR-ACNC: NEGATIVE — SIGNIFICANT CHANGE UP
LYMPHOCYTES # BLD AUTO: 0.94 K/UL — LOW (ref 1–3.3)
LYMPHOCYTES # BLD AUTO: 12.5 % — LOW (ref 13–44)
M PNEUMO DNA SPEC QL NAA+PROBE: SIGNIFICANT CHANGE UP
MCHC RBC-ENTMCNC: 29.8 PG — SIGNIFICANT CHANGE UP (ref 27–34)
MCHC RBC-ENTMCNC: 32.1 GM/DL — SIGNIFICANT CHANGE UP (ref 32–36)
MCV RBC AUTO: 92.6 FL — SIGNIFICANT CHANGE UP (ref 80–100)
MONOCYTES # BLD AUTO: 0.69 K/UL — SIGNIFICANT CHANGE UP (ref 0–0.9)
MONOCYTES NFR BLD AUTO: 9.2 % — SIGNIFICANT CHANGE UP (ref 2–14)
NEUTROPHILS # BLD AUTO: 5.75 K/UL — SIGNIFICANT CHANGE UP (ref 1.8–7.4)
NEUTROPHILS NFR BLD AUTO: 76.3 % — SIGNIFICANT CHANGE UP (ref 43–77)
NITRITE UR-MCNC: POSITIVE
NRBC # BLD: 0 /100 WBCS — SIGNIFICANT CHANGE UP (ref 0–0)
NRBC # FLD: 0 K/UL — SIGNIFICANT CHANGE UP (ref 0–0)
PCO2 BLDV: 50 MMHG — HIGH (ref 39–42)
PH BLDV: 7.4 — SIGNIFICANT CHANGE UP (ref 7.32–7.43)
PH UR: 5.5 — SIGNIFICANT CHANGE UP (ref 5–8)
PLATELET # BLD AUTO: 261 K/UL — SIGNIFICANT CHANGE UP (ref 150–400)
PO2 BLDV: 33 MMHG — SIGNIFICANT CHANGE UP
POTASSIUM BLDV-SCNC: 4.9 MMOL/L — SIGNIFICANT CHANGE UP (ref 3.5–5.1)
POTASSIUM SERPL-MCNC: 4 MMOL/L — SIGNIFICANT CHANGE UP (ref 3.5–5.3)
POTASSIUM SERPL-SCNC: 4 MMOL/L — SIGNIFICANT CHANGE UP (ref 3.5–5.3)
PROT SERPL-MCNC: 8 G/DL — SIGNIFICANT CHANGE UP (ref 6–8.3)
PROT UR-MCNC: NEGATIVE — SIGNIFICANT CHANGE UP
RAPID RVP RESULT: DETECTED
RBC # BLD: 4.47 M/UL — SIGNIFICANT CHANGE UP (ref 3.8–5.2)
RBC # FLD: 13.7 % — SIGNIFICANT CHANGE UP (ref 10.3–14.5)
RBC CASTS # UR COMP ASSIST: 0 /HPF — SIGNIFICANT CHANGE UP (ref 0–4)
RSV RNA SPEC QL NAA+PROBE: SIGNIFICANT CHANGE UP
RV+EV RNA SPEC QL NAA+PROBE: SIGNIFICANT CHANGE UP
SAO2 % BLDV: 51.8 % — SIGNIFICANT CHANGE UP
SARS-COV-2 RNA SPEC QL NAA+PROBE: SIGNIFICANT CHANGE UP
SODIUM SERPL-SCNC: 137 MMOL/L — SIGNIFICANT CHANGE UP (ref 135–145)
SP GR SPEC: 1.01 — LOW (ref 1.01–1.05)
TROPONIN T, HIGH SENSITIVITY RESULT: 17 NG/L — SIGNIFICANT CHANGE UP
TROPONIN T, HIGH SENSITIVITY RESULT: 18 NG/L — SIGNIFICANT CHANGE UP
UROBILINOGEN FLD QL: SIGNIFICANT CHANGE UP
WBC # BLD: 7.53 K/UL — SIGNIFICANT CHANGE UP (ref 3.8–10.5)
WBC # FLD AUTO: 7.53 K/UL — SIGNIFICANT CHANGE UP (ref 3.8–10.5)
WBC UR QL: 1 /HPF — SIGNIFICANT CHANGE UP (ref 0–5)

## 2022-11-05 PROCEDURE — 71045 X-RAY EXAM CHEST 1 VIEW: CPT | Mod: 26

## 2022-11-05 PROCEDURE — 99284 EMERGENCY DEPT VISIT MOD MDM: CPT | Mod: CS,GC

## 2022-11-05 RX ORDER — SODIUM CHLORIDE 9 MG/ML
1000 INJECTION INTRAMUSCULAR; INTRAVENOUS; SUBCUTANEOUS ONCE
Refills: 0 | Status: COMPLETED | OUTPATIENT
Start: 2022-11-05 | End: 2022-11-05

## 2022-11-05 RX ORDER — ACETAMINOPHEN 500 MG
1000 TABLET ORAL ONCE
Refills: 0 | Status: COMPLETED | OUTPATIENT
Start: 2022-11-05 | End: 2022-11-05

## 2022-11-05 RX ADMIN — Medication 400 MILLIGRAM(S): at 08:59

## 2022-11-05 RX ADMIN — SODIUM CHLORIDE 1000 MILLILITER(S): 9 INJECTION INTRAMUSCULAR; INTRAVENOUS; SUBCUTANEOUS at 08:59

## 2022-11-05 NOTE — ED PROVIDER NOTE - ATTENDING CONTRIBUTION TO CARE
I have personally seen and examined this patient.  I have fully participated in the care of this patient. I performed a substantive portion of the visit including all aspects of the medical decision making. I have reviewed all pertinent clinical information, including history, physical exam, plan and the Resident’s note and agree except as noted. - MD Nav.    87 yo F, dm, with 2d viral symptoms, sick contact, family member feels sick as home, likely viral but given her age, if any signs of pna, will admit, otherwise can be dc, if the family is reliable and comfortable with continuation of the care at home, reasess.

## 2022-11-05 NOTE — ED ADULT TRIAGE NOTE - CHIEF COMPLAINT QUOTE
Pt c/o cough, headache, generalized weakness, sore throat, body aches x 2 days. Pt noted to have sick contact with family that had similar symptoms. pt home covid was negative. Pt noted to be febrile.  No complaints of chest pain,  nausea, dizziness, vomiting  SOB, fever verbalized..

## 2022-11-05 NOTE — ED PROVIDER NOTE - CLINICAL SUMMARY MEDICAL DECISION MAKING FREE TEXT BOX
87 yo F pmhx htn hld dm p/w 2 days of fevers body aches HA non productive cough and sore throat a/w cp. + sick contact at home. 102F other VSS. 100 on RA lungs ctab. c/f uri/flu/covid vs pna. lower c/f acs pe. plan labs ekg cxr ua ivf tylenol and r/a

## 2022-11-05 NOTE — ED PROVIDER NOTE - PROGRESS NOTE DETAILS
Attending/MD Nav. fever, bodily ache, flu positive, normal SpO2, not hypoxic, xray without signs of pna, share decision making with the family, out pt management and strict retrun precaution for sustained fever or ams or cough, or signs of superimposed bacterial infection.

## 2022-11-05 NOTE — ED ADULT NURSE NOTE - OBJECTIVE STATEMENT
89y/o female c/o cough and fever x 1 day. pt aox4, resp even unlabored, o2 100%. pt denies chest pain or SOB. abd soft non tender, non distended to all quadrants. pt states pt is sore while coughing. denies numbness or tingling, +ROM to all extremities with purposeful movement. Pt denies any painful or frequent urination. Daughter at bedside, states to have sick contact with flu + person.

## 2022-11-05 NOTE — ED PROVIDER NOTE - OBJECTIVE STATEMENT
87 yo F pmhx htn hld dm2 p/w 2 days of fevers body aches HA non productive cough and sore throat. subjective fevers at home. pt had home contact flu+. diffuse body aches. able to tolerate po. pt having chest discomfort after coughing. no sob. no abd pain n/v diarrhea dysuria. Pt had flu vaccine last week. covhugo arreguin.

## 2022-11-05 NOTE — ED PROVIDER NOTE - PHYSICAL EXAMINATION
Gen: NAD, non-toxic appearing febrile 102   Head: normal appearing  HEENT: normal conjunctiva, oral mucosa moist  Lung: no respiratory distress, speaking in full sentences, CTA b/l  100% on ra   CV: regular rate and rhythm, no murmurs  Abd: soft, non distended, non tender   MSK: no visible deformities  Neuro: No focal deficits, AAOx3  Skin: Warm  Psych: normal affect

## 2022-11-05 NOTE — ED PROVIDER NOTE - PATIENT PORTAL LINK FT
You can access the FollowMyHealth Patient Portal offered by [facility] by registering at the following website: [email or web address]. By joining [employer]’s FollowMyHealth portal, you will also be able to view your health information using other applications (apps) compatible with our system.

## 2022-11-08 LAB
-  AMIKACIN: SIGNIFICANT CHANGE UP
-  AMPICILLIN/SULBACTAM: SIGNIFICANT CHANGE UP
-  AMPICILLIN: SIGNIFICANT CHANGE UP
-  AZTREONAM: SIGNIFICANT CHANGE UP
-  CEFAZOLIN: SIGNIFICANT CHANGE UP
-  CEFEPIME: SIGNIFICANT CHANGE UP
-  CEFOXITIN: SIGNIFICANT CHANGE UP
-  CEFTRIAXONE: SIGNIFICANT CHANGE UP
-  CIPROFLOXACIN: SIGNIFICANT CHANGE UP
-  IMIPENEM: SIGNIFICANT CHANGE UP
-  LEVOFLOXACIN: SIGNIFICANT CHANGE UP
-  MEROPENEM: SIGNIFICANT CHANGE UP
ORGANISM # SPEC MICROSCOPIC CNT: SIGNIFICANT CHANGE UP
ORGANISM # SPEC MICROSCOPIC CNT: SIGNIFICANT CHANGE UP
SPECIMEN SOURCE: SIGNIFICANT CHANGE UP

## 2022-11-09 NOTE — ED POST DISCHARGE NOTE - RESULT SUMMARY
10-49,000  E coli on urine [facility].  patients daugther patient is asx . Will repeat a urine with hre pcp to make sure clear.

## 2022-12-03 NOTE — PHYSICAL THERAPY INITIAL EVALUATION ADULT - ASR WT BEARING STATUS EVAL
Report from Negra MARCELO. Pt feeling uncomfortable. Declines offers of pain meds at this time.  at bedside and supportive   2 no weight-bearing restrictions

## 2022-12-06 ENCOUNTER — EMERGENCY (EMERGENCY)
Facility: HOSPITAL | Age: 88
LOS: 1 days | Discharge: HOME | End: 2022-12-06
Attending: EMERGENCY MEDICINE | Admitting: EMERGENCY MEDICINE

## 2022-12-06 VITALS
HEART RATE: 93 BPM | DIASTOLIC BLOOD PRESSURE: 64 MMHG | OXYGEN SATURATION: 100 % | SYSTOLIC BLOOD PRESSURE: 150 MMHG | TEMPERATURE: 98 F | RESPIRATION RATE: 17 BRPM

## 2022-12-06 VITALS
DIASTOLIC BLOOD PRESSURE: 68 MMHG | HEART RATE: 90 BPM | RESPIRATION RATE: 18 BRPM | SYSTOLIC BLOOD PRESSURE: 147 MMHG | TEMPERATURE: 99 F | OXYGEN SATURATION: 100 %

## 2022-12-06 DIAGNOSIS — J34.9 UNSPECIFIED DISORDER OF NOSE AND NASAL SINUSES: Chronic | ICD-10-CM

## 2022-12-06 PROCEDURE — 73564 X-RAY EXAM KNEE 4 OR MORE: CPT | Mod: 26,50

## 2022-12-06 PROCEDURE — 73610 X-RAY EXAM OF ANKLE: CPT | Mod: 26,RT

## 2022-12-06 PROCEDURE — 73620 X-RAY EXAM OF FOOT: CPT | Mod: 26,RT

## 2022-12-06 PROCEDURE — 99283 EMERGENCY DEPT VISIT LOW MDM: CPT | Mod: FS

## 2022-12-06 RX ORDER — ACETAMINOPHEN 500 MG
975 TABLET ORAL ONCE
Refills: 0 | Status: COMPLETED | OUTPATIENT
Start: 2022-12-06 | End: 2022-12-06

## 2022-12-06 RX ADMIN — Medication 975 MILLIGRAM(S): at 07:52

## 2022-12-06 NOTE — ED PROVIDER NOTE - PROGRESS NOTE DETAILS
HOLLIE Morley - xrays performed - no fractures noted. Pt. reassessed - stillin some pain but able to ambulate/weight bear with assistance. SPoke with patients daughter who is comfortable taking patient home at this time. Pt. walks at home with walker/cane. Fall precautions given.

## 2022-12-06 NOTE — ED ADULT TRIAGE NOTE - CHIEF COMPLAINT QUOTE
Pt c/o worsening b/l knee pain x 2 days. Pt tripped and fell on Sunday, denies hitting head, LOC, blood thinner user,  fell unto b/l knees, pain worse on right side. PMHX: HTN, DM2, Osteoarthritis

## 2022-12-06 NOTE — ED ADULT NURSE NOTE - OBJECTIVE STATEMENT
87y/o F with Hx of osteoarthritis presents to ED with c/o b/l knee pain, worse on right, s/p mechanical slip and fall on Sunday. Pt reports that she is currently unable to ambulate due to the pain. At her baseline she is able to ambulate with cane/walker. She denies LOC, use of blood thinners, head trauma, other complaints. Pt states that she has not taken any medications to alleviate her pain. Safety maintained. Imaging pending.

## 2022-12-06 NOTE — ED PROVIDER NOTE - PATIENT PORTAL LINK FT
You can access the FollowMyHealth Patient Portal offered by [facility] by registering at the following website: [email or web address]. By joining [employer]’s FollowMyHealth portal, you will also be able to view your health information using other applications (apps) compatible with our system.

## 2022-12-06 NOTE — ED ADULT NURSE NOTE - NSIMPLEMENTINTERV_GEN_ALL_ED
Implemented All Fall with Harm Risk Interventions:  [place] to call system. Call bell, personal items and telephone within reach. Instruct patient to call for assistance. Room bathroom lighting operational. Non-slip footwear when patient is off stretcher. Physically safe environment: no spills, clutter or unnecessary equipment. Stretcher in lowest position, wheels locked, appropriate side rails in place. Provide visual cue, wrist band, yellow gown, etc. Monitor gait and stability. Monitor for mental status changes and reorient to person, place, and time. Review medications for side effects contributing to fall risk. Reinforce activity limits and safety measures with patient and family. Provide visual clues: red socks.

## 2022-12-06 NOTE — ED PROVIDER NOTE - PHYSICAL EXAMINATION
Vital signs reviewed.   CONSTITUTIONAL: Well-appearing; well-nourished; in no apparent distress. Non-toxic appearing.   HEAD: Normocephalic, atraumatic.  EYES: Normal conjunctiva and no sclera injection noted  ENT: normal nose; no rhinorrhea.  CARD: Normal S1, S2  RESP: Normal chest excursion with respiration; breath sounds clear and equal bilaterally  ABD/GI: soft, non-distended; non-tender; no palpable organomegaly, no pulsatile mass.  EXT/MS: RT knee DROM w/ extension and flexion. Minimal swelling on exam. Tenderness to palpation. RT foot tenderness to midfoot, no swelling, pulse intact. FAROM of ankle/hip.   SKIN: Normal for age and race; warm; dry; good turgor; no apparent lesions or exudate noted.  NEURO: Awake, alert, oriented x 3,.  PSYCH: Normal mood; appropriate affect.

## 2022-12-06 NOTE — ED PROVIDER NOTE - OBJECTIVE STATEMENT
87 Y/O F w/ PMH of HTN, HLD, DM and OA presents to ER for RT knee pain. Fell on ground while transitioning off commode to bed 2 days ago. Landed on Rt knee. Has since experienced pain w/ bearing weight and ambulating. At baseline utilizes a walker. Taking Tylenol for pain w/ relief. No prodromal symptoms. No associated dizziness, headache, chest pain or shortness of breath. Not on aspirin or anticoagulant.

## 2022-12-06 NOTE — ED PROVIDER NOTE - ATTENDING APP SHARED VISIT CONTRIBUTION OF CARE
88-year-old female with a past medical history of osteoarthritis diabetes hyperlipidemia hypertension presenting with right knee pain.  2 days ago the patient fell forward onto her right knee while transitioning off her commode.  There was no head trauma.  Patient has had pain since which is gotten severe.  Worse with bearing weight and ambulating.  Tylenol does help relieve her pain.  No other pain reported no other injuries.    Gen: Well appearing in NAD  Head: NC/AT  Neck: trachea midline  Resp:  No distress  Ext: no deformities, mild R knee swelling.  Limited ROM secondary to pain.  No laxity.  Good distal PMS  Neuro:  A&O appears non focal  Skin:  Warm and dry as visualized  Psych:  Normal affect and mood     88-year-old female with right knee pain after mechanical fall.  Being that there was 2 days since injury and no trauma to head will not scan head at this time.  Differential includes ligamentous injury fracture sprain.  Will need plain films first to ascertain if there is a fracture.  Depending on the results of the patient's ability to ambulate cross-sectional imaging may be warranted.  The patient cannot ambulate safely with walker will need admission for PT OT.

## 2022-12-06 NOTE — ED PROVIDER NOTE - WR ORDER DATE AND TIME 2
"Subjective:       Patient ID: Nieves Torres is a 66 y.o. male.    Chief Complaint: Establish Care    66-year-old  male patient new to my clinic here to establish care.  Patient with Patient Active Problem List:     Hypertension associated with diabetes     Hyperlipidemia associated with type 2 diabetes mellitus     Type 2 diabetes mellitus without complication, without long-term current use of insulin     Coronary artery disease involving native coronary artery of native heart without angina pectoris     Lipoma of back  Here reports that he was not taking his medications for the past 5 months but just started taking it for the past 2 weeks.  Has been followed by his cardiologist Dr. Alonso.   Denies any chest pain or difficulty breathing with palpitations and denies any extreme fatigue.   Reports having lipoma to the right upper back to the scapular area for past several years and has not caused any problems  Denies any tingling or numbness sensation to extremities    Review of Systems   Constitutional:  Negative for appetite change and fatigue.   Eyes:  Negative for visual disturbance.   Respiratory:  Negative for shortness of breath.    Cardiovascular:  Negative for chest pain, palpitations and leg swelling.   Gastrointestinal:  Negative for abdominal pain, nausea and vomiting.   Musculoskeletal:  Negative for myalgias.   Skin:  Negative for rash.   Neurological:  Negative for weakness, numbness and headaches.   Psychiatric/Behavioral:  Negative for sleep disturbance.        /76 (BP Location: Left arm, Patient Position: Sitting, BP Method: Medium (Manual))   Pulse 90   Temp 98.1 °F (36.7 °C) (Tympanic)   Resp 20   Ht 6' 3.5" (1.918 m)   Wt 81.1 kg (178 lb 12.7 oz)   SpO2 99%   BMI 22.05 kg/m²   Objective:      Physical Exam  Constitutional:       Appearance: He is well-developed.   HENT:      Head: Normocephalic and atraumatic.   Cardiovascular:      Rate and Rhythm: Normal rate and " regular rhythm.      Heart sounds: Normal heart sounds. No murmur heard.  Pulmonary:      Effort: Pulmonary effort is normal.      Breath sounds: Normal breath sounds. No wheezing.   Abdominal:      General: Bowel sounds are normal.      Palpations: Abdomen is soft.      Tenderness: There is no abdominal tenderness.   Skin:     General: Skin is warm and dry.      Findings: No rash.      Comments: Noted lipoma to the right scapular area-soft , mobile   Neurological:      Mental Status: He is alert and oriented to person, place, and time.   Psychiatric:         Mood and Affect: Mood normal.         Assessment/Plan:   1. Routine general medical examination at a health care facility  - CBC Auto Differential; Future  - Comprehensive Metabolic Panel; Future  - Lipid Panel; Future  - TSH; Future  - Urinalysis, Reflex to Urine Culture Urine, Clean Catch; Future  - Hepatitis C Antibody; Future  - HIV 1/2 Ag/Ab (4th Gen); Future  - PSA, Screening; Future  Vital signs stable today.  Clinical exam stable  Continue lifestyle modifications with low-fat and low-cholesterol diet and exercise 30 minutes daily      2. Hypertension associated with diabetes  - Comprehensive Metabolic Panel; Future  - Lipid Panel; Future  - TSH; Future  - Urinalysis, Reflex to Urine Culture Urine, Clean Catch; Future  Blood pressure is stable currently on Avapro 300 mg daily and metoprolol 50 mg daily  Followed by Cardiology    3. Hyperlipidemia associated with type 2 diabetes mellitus  - Lipid Panel; Future  Currently on Lipitor 40 mg daily    4. Type 2 diabetes mellitus without complication, without long-term current use of insulin  - Comprehensive Metabolic Panel; Future  - Lipid Panel; Future  - Hemoglobin A1C; Future  - Microalbumin/Creatinine Ratio, Urine; Future  Currently on glimepiride 2 mg daily  Patient to see yearly eye and foot physician  Strict lifestyle changes recommended with 1800 ADA low-fat and low-cholesterol diet and exercise 30  minutes daily      5. Coronary artery disease involving native coronary artery of native heart without angina pectoris  Stable and asymptomatic    6. Lipoma of back   Stable and asymptomatic           06-Dec-2022 06:37

## 2022-12-06 NOTE — ED PROVIDER NOTE - NS ED ROS FT
Constitutional: (-) fever  Head: Normal cephalic, Atraumatic  Cardiovascular: (-) chest pain, (-) wheezing  Respiratory: (-) cough, (-) shortness of breath  Gastrointestinal: (-) vomiting, (-) diarrhea, (-) abdominal pain  : (-) dysuria   Musculoskeletal: (-) back pain (+) RT knee/foot pain  Integumentary: (-) rash, (-) edema  Neurological: (-)loc  Allergic/Immunologic: (-) pruritus

## 2022-12-06 NOTE — ED PROVIDER NOTE - CLINICAL SUMMARY MEDICAL DECISION MAKING FREE TEXT BOX
89 Y/O F w/ PMH of HTN, HLD, DM and OA presents to ER for RT knee pain.   XR r/o fracture. Pain management. Re-evaluate

## 2023-02-16 ENCOUNTER — EMERGENCY (EMERGENCY)
Facility: HOSPITAL | Age: 89
LOS: 1 days | Discharge: HOME | End: 2023-02-16
Attending: PERSONAL EMERGENCY RESPONSE ATTENDANT | Admitting: PERSONAL EMERGENCY RESPONSE ATTENDANT
Payer: MEDICARE

## 2023-02-16 VITALS
HEART RATE: 83 BPM | TEMPERATURE: 98 F | RESPIRATION RATE: 17 BRPM | SYSTOLIC BLOOD PRESSURE: 144 MMHG | DIASTOLIC BLOOD PRESSURE: 83 MMHG | OXYGEN SATURATION: 97 %

## 2023-02-16 DIAGNOSIS — J34.9 UNSPECIFIED DISORDER OF NOSE AND NASAL SINUSES: Chronic | ICD-10-CM

## 2023-02-16 PROCEDURE — 99284 EMERGENCY DEPT VISIT MOD MDM: CPT | Mod: GC

## 2023-02-16 RX ORDER — LIDOCAINE 4 G/100G
1 CREAM TOPICAL ONCE
Refills: 0 | Status: COMPLETED | OUTPATIENT
Start: 2023-02-16 | End: 2023-02-16

## 2023-02-16 NOTE — ED PROVIDER NOTE - NS ED ROS FT
CONSTITUTIONAL: No fever or chill  HEENT: Denies changes in vision and hearing.  RESPIRATORY: Denies SOB and cough.  CV: Deneis CP.   GI: Denies abdominal pain, nausea, vomiting and diarrhea.  : Denies dysuria and urinary frequency.  MSK: Denies myalgia and joint pain.  SKIN: Denies rash   NEUROLOGICAL: dizziness, disorientation

## 2023-02-16 NOTE — ED ADULT NURSE NOTE - OBJECTIVE STATEMENT
pt presents to ED with c/o being altered and confused at home per daughter, pt is more quiet, does not wish to answer RN's questions, nods and shakes head and selectively uses word, but kept referring RN to daughter for answers to questions. per daughter, pt has been having episodes of confusion and forgetfulness for about 6 months but got worse in the past 2 days and family elected to get her checked. pt have good eye contact when called, but cannot answer AO questions or location and time.

## 2023-02-16 NOTE — ED ADULT NURSE NOTE - NS ED NURSE LEVEL OF CONSCIOUSNESS SPEECH
Speaking Coherently Nasalis-Muscle-Based Myocutaneous Island Pedicle Flap Text: Using a #15 blade, an incision was made around the donor flap to the level of the nasalis muscle. Wide lateral undermining was then performed in both the subcutaneous plane above the nasalis muscle, and in a submuscular plane just above periosteum. This allowed the formation of a free nasalis muscle axial pedicle (based on the angular artery) which was still attached to the actual cutaneous flap, increasing its mobility and vascular viability. Hemostasis was obtained with pinpoint electrocoagulation. The flap was mobilized into position and the pivotal anchor points positioned and stabilized with buried interrupted sutures. Subcutaneous and dermal tissues were closed in a multilayered fashion with sutures. Tissue redundancies were excised, and the epidermal edges were apposed without significant tension and sutured with sutures.

## 2023-02-16 NOTE — ED PROVIDER NOTE - PATIENT PORTAL LINK FT
You can access the FollowMyHealth Patient Portal offered by [facility] by registering at the following website: [email or web address]. By joining [employer]’s FollowMyHealth portal, you will also be able to view your health information using other applications (apps) compatible with our system.

## 2023-02-16 NOTE — ED PROVIDER NOTE - CLINICAL SUMMARY MEDICAL DECISION MAKING FREE TEXT BOX
Attending MD Galindo.  Agree with above.  Pt is an 89 yo fem with pmhx of GERD, DM, HTN who presents to ED with complaint of 'dizziness' and not feeling well this evening suddenly at ~2030.  Pt started shouting and said that she didn't feel well.  Daughter gave pt dinner at 1830 and was at baseline.  At ~2030 pt stated that she wasn't feeling well, was feeling dizzy.  Pt was resisting daughter at that time but didn't vomit.  On arrival to ED pt is neurologically non-focal.  No report of focal neuro deficit throughout event and 'dizziness' is not possible to clarify 2/2 pt dementia.  Planned CT head, labs etc.  Will discuss pt's care and family ability to care for her at home if work-up otherwise non-actionable.  PT hemodynamically stable at time of signout to incoming team.

## 2023-02-16 NOTE — ED PROVIDER NOTE - PHYSICAL EXAMINATION
GENERAL: Alert. No acute distress.   EYES: EOMI grossly normal. Anicteric.   HENT: Moist mucous membranes.   RESP: No conversation dyspnea, no resp distress, CTAB   CARDIOVASCULAR: RRR, no m/g/r  ABDOMEN: soft, non distended, nttp  MSK: unable to move  left arm from shoulder, chronic per patient's daughter  SKIN: warm and dry  NEUROLOGIC: Alert and oriented x3, CN II to XII intact. Sensation grossly intact in all 4 extremities. Unable to fully test strength in extremities, unable to perform finger-to-nose, and heel-to-shin test;  patient has significant osteoarthritis in hip and multiple joint pain, limiting mobility. Patient is oriented to self, somewhat oriented to date, somewhat oriented to location.  some word finding difficulty, baseline per daughter  PSYCHIATRIC: Cooperative. Appropriate mood and affect

## 2023-02-16 NOTE — ED PROVIDER NOTE - OBJECTIVE STATEMENT
88-year-old female here for altered mental status.  Past medical history of diabetes hypertension hyperlipidemia and arthritis.  Patient states that she served for dinner at 630 pm, and patient is normal mental status at that point.  At 830pm,   Patient states she is not feeling well,  has an episode of dizziness (unable to characterize further due to dementia) and was disoriented per patient daughter.  Patient also become aggressive and resists assistance from daughter, which has never happened before.  Per daughter, patient does get more confused in the evening, but this is more confused than usual.   Daughter particularly concerned because of her disorientation.

## 2023-02-16 NOTE — ED ADULT TRIAGE NOTE - CHIEF COMPLAINT QUOTE
Patient brought by EMS for AMS. Per family, pt. was at baseline around 6pm, at approx 8pm, pt. was shouting something is wrong. No slurred speech, equal strength/sensation b/l. Currently denies CP/HA/SOB. Endorses chronic pain to L arm. Arrives with 18G IV to L AC, NS infusing. Dr. Szymanski called for stroke eval, no code stroke. PMH Dementia, DM, HTN.

## 2023-02-16 NOTE — ED ADULT NURSE NOTE - NSIMPLEMENTINTERV_GEN_ALL_ED
Implemented All Fall Risk Interventions:  [place] to call system. Call bell, personal items and telephone within reach. Instruct patient to call for assistance. Room bathroom lighting operational. Non-slip footwear when patient is off stretcher. Physically safe environment: no spills, clutter or unnecessary equipment. Stretcher in lowest position, wheels locked, appropriate side rails in place. Provide visual cue, wrist band, yellow gown, etc. Monitor gait and stability. Monitor for mental status changes and reorient to person, place, and time. Review medications for side effects contributing to fall risk. Reinforce activity limits and safety measures with patient and family.

## 2023-02-16 NOTE — ED PROVIDER NOTE - PROGRESS NOTE DETAILS
Valentino Ch) [facility] PGY-1: daughter not at bedside when returned to discuss plan. Called phone on file, went to voice mail. Valentino Ch) [facility] PGY-1: touched base with daughter. Daughter feel safe and willing to continue care for her at home. Daughter phone number is [phone]. Plan to  pt after she dropped off her kids at school. Around 0830am

## 2023-02-16 NOTE — ED PROVIDER NOTE - NSFOLLOWUPINSTRUCTIONS_ED_ALL_ED_FT
You have an episode of dizziness and increase confusion.     Your lab work and head imaging is not indicative of any emergent etiology.     This could be worsening of your dementia.    I have sent a neurology referral for you.     Also your urine looks a bit infectious. This is common in older woman, and we do not treat this without symptoms. If you develop dysuria. urinary frequency, urinary urgency, or any other urine symptoms, follow up with your PCP    If you have any severe increase in pain, fever, uncontrollable nausea or vomiting, or inability to tolerate eating and drinking you need to immediately return to the emergency room.

## 2023-02-16 NOTE — ED PROVIDER NOTE - ATTENDING CONTRIBUTION TO CARE
Attending MD Galindo:  I performed a history and physical exam of the patient and discussed their management with the resident. I reviewed the resident's note and agree with the documented findings and plan of care. My medical decision making and observations are found above.

## 2023-02-17 VITALS
OXYGEN SATURATION: 98 % | DIASTOLIC BLOOD PRESSURE: 72 MMHG | RESPIRATION RATE: 18 BRPM | SYSTOLIC BLOOD PRESSURE: 132 MMHG | TEMPERATURE: 98 F | HEART RATE: 81 BPM

## 2023-02-17 LAB
ALBUMIN SERPL ELPH-MCNC: 3.9 G/DL — SIGNIFICANT CHANGE UP (ref 3.3–5)
ALP SERPL-CCNC: 91 U/L — SIGNIFICANT CHANGE UP (ref 40–120)
ALT FLD-CCNC: 17 U/L — SIGNIFICANT CHANGE UP (ref 4–33)
ANION GAP SERPL CALC-SCNC: 10 MMOL/L — SIGNIFICANT CHANGE UP (ref 7–14)
APPEARANCE UR: CLEAR — SIGNIFICANT CHANGE UP
APTT BLD: 27.8 SEC — SIGNIFICANT CHANGE UP (ref 27–36.3)
AST SERPL-CCNC: 16 U/L — SIGNIFICANT CHANGE UP (ref 4–32)
BACTERIA # UR AUTO: ABNORMAL
BASOPHILS # BLD AUTO: 0.04 K/UL — SIGNIFICANT CHANGE UP (ref 0–0.2)
BASOPHILS NFR BLD AUTO: 0.5 % — SIGNIFICANT CHANGE UP (ref 0–2)
BILIRUB SERPL-MCNC: 0.3 MG/DL — SIGNIFICANT CHANGE UP (ref 0.2–1.2)
BILIRUB UR-MCNC: NEGATIVE — SIGNIFICANT CHANGE UP
BUN SERPL-MCNC: 16 MG/DL — SIGNIFICANT CHANGE UP (ref 7–23)
CALCIUM SERPL-MCNC: 8.9 MG/DL — SIGNIFICANT CHANGE UP (ref 8.4–10.5)
CHLORIDE SERPL-SCNC: 105 MMOL/L — SIGNIFICANT CHANGE UP (ref 98–107)
CO2 SERPL-SCNC: 25 MMOL/L — SIGNIFICANT CHANGE UP (ref 22–31)
COLOR SPEC: SIGNIFICANT CHANGE UP
CREAT SERPL-MCNC: 0.95 MG/DL — SIGNIFICANT CHANGE UP (ref 0.5–1.3)
DIFF PNL FLD: NEGATIVE — SIGNIFICANT CHANGE UP
EGFR: 58 ML/MIN/1.73M2 — LOW
EOSINOPHIL # BLD AUTO: 0.08 K/UL — SIGNIFICANT CHANGE UP (ref 0–0.5)
EOSINOPHIL NFR BLD AUTO: 1 % — SIGNIFICANT CHANGE UP (ref 0–6)
EPI CELLS # UR: 1 /HPF — SIGNIFICANT CHANGE UP (ref 0–5)
FLUAV AG NPH QL: SIGNIFICANT CHANGE UP
FLUBV AG NPH QL: SIGNIFICANT CHANGE UP
GLUCOSE SERPL-MCNC: 155 MG/DL — HIGH (ref 70–99)
GLUCOSE UR QL: ABNORMAL
HCT VFR BLD CALC: 45.5 % — HIGH (ref 34.5–45)
HGB BLD-MCNC: 14.2 G/DL — SIGNIFICANT CHANGE UP (ref 11.5–15.5)
HYALINE CASTS # UR AUTO: 0 /LPF — SIGNIFICANT CHANGE UP (ref 0–7)
IANC: 4.34 K/UL — SIGNIFICANT CHANGE UP (ref 1.8–7.4)
IMM GRANULOCYTES NFR BLD AUTO: 0.4 % — SIGNIFICANT CHANGE UP (ref 0–0.9)
INR BLD: 1.05 RATIO — SIGNIFICANT CHANGE UP (ref 0.88–1.16)
KETONES UR-MCNC: NEGATIVE — SIGNIFICANT CHANGE UP
LEUKOCYTE ESTERASE UR-ACNC: NEGATIVE — SIGNIFICANT CHANGE UP
LYMPHOCYTES # BLD AUTO: 3.14 K/UL — SIGNIFICANT CHANGE UP (ref 1–3.3)
LYMPHOCYTES # BLD AUTO: 38.3 % — SIGNIFICANT CHANGE UP (ref 13–44)
MAGNESIUM SERPL-MCNC: 2.3 MG/DL — SIGNIFICANT CHANGE UP (ref 1.6–2.6)
MCHC RBC-ENTMCNC: 29.3 PG — SIGNIFICANT CHANGE UP (ref 27–34)
MCHC RBC-ENTMCNC: 31.2 GM/DL — LOW (ref 32–36)
MCV RBC AUTO: 93.8 FL — SIGNIFICANT CHANGE UP (ref 80–100)
MONOCYTES # BLD AUTO: 0.56 K/UL — SIGNIFICANT CHANGE UP (ref 0–0.9)
MONOCYTES NFR BLD AUTO: 6.8 % — SIGNIFICANT CHANGE UP (ref 2–14)
NEUTROPHILS # BLD AUTO: 4.34 K/UL — SIGNIFICANT CHANGE UP (ref 1.8–7.4)
NEUTROPHILS NFR BLD AUTO: 53 % — SIGNIFICANT CHANGE UP (ref 43–77)
NITRITE UR-MCNC: POSITIVE
NRBC # BLD: 0 /100 WBCS — SIGNIFICANT CHANGE UP (ref 0–0)
NRBC # FLD: 0 K/UL — SIGNIFICANT CHANGE UP (ref 0–0)
PH UR: 7 — SIGNIFICANT CHANGE UP (ref 5–8)
PHOSPHATE SERPL-MCNC: 3.4 MG/DL — SIGNIFICANT CHANGE UP (ref 2.5–4.5)
PLATELET # BLD AUTO: 279 K/UL — SIGNIFICANT CHANGE UP (ref 150–400)
POTASSIUM SERPL-MCNC: 3.4 MMOL/L — LOW (ref 3.5–5.3)
POTASSIUM SERPL-SCNC: 3.4 MMOL/L — LOW (ref 3.5–5.3)
PROT SERPL-MCNC: 7.8 G/DL — SIGNIFICANT CHANGE UP (ref 6–8.3)
PROT UR-MCNC: NEGATIVE — SIGNIFICANT CHANGE UP
PROTHROM AB SERPL-ACNC: 12.2 SEC — SIGNIFICANT CHANGE UP (ref 10.5–13.4)
RBC # BLD: 4.85 M/UL — SIGNIFICANT CHANGE UP (ref 3.8–5.2)
RBC # FLD: 14.5 % — SIGNIFICANT CHANGE UP (ref 10.3–14.5)
RBC CASTS # UR COMP ASSIST: 1 /HPF — SIGNIFICANT CHANGE UP (ref 0–4)
RSV RNA NPH QL NAA+NON-PROBE: SIGNIFICANT CHANGE UP
SARS-COV-2 RNA SPEC QL NAA+PROBE: SIGNIFICANT CHANGE UP
SODIUM SERPL-SCNC: 140 MMOL/L — SIGNIFICANT CHANGE UP (ref 135–145)
SP GR SPEC: 1.02 — SIGNIFICANT CHANGE UP (ref 1.01–1.05)
TSH SERPL-MCNC: 1.88 UIU/ML — SIGNIFICANT CHANGE UP (ref 0.27–4.2)
UROBILINOGEN FLD QL: SIGNIFICANT CHANGE UP
WBC # BLD: 8.19 K/UL — SIGNIFICANT CHANGE UP (ref 3.8–10.5)
WBC # FLD AUTO: 8.19 K/UL — SIGNIFICANT CHANGE UP (ref 3.8–10.5)
WBC UR QL: 3 /HPF — SIGNIFICANT CHANGE UP (ref 0–5)

## 2023-02-17 PROCEDURE — 70450 CT HEAD/BRAIN W/O DYE: CPT | Mod: 26,MA

## 2023-02-17 RX ADMIN — LIDOCAINE 1 PATCH: 4 CREAM TOPICAL at 00:36

## 2023-02-17 NOTE — ED ADULT NURSE REASSESSMENT NOTE - NS ED NURSE REASSESS COMMENT FT1
pt in bed asleep but arouses to voice, breathing well on RA and in NAD. pt ready to be dc, MD to contact family before dispo.

## 2023-02-17 NOTE — ED ADULT NURSE REASSESSMENT NOTE - NS ED NURSE REASSESS COMMENT FT1
From: Serge Weller  To:  Dagoberto Richardson MD  Sent: 5/21/2020 9:20 AM CDT  Subject: Non-Urgent Medical Question    Referral request - x-ray's Right toe, Left knee & Hernia Of Right Side    Right toe knuckle - I think I broke my right toe in Oct 2020 but Break RN note- Patient resting quietly in bed, breathing even and nonlabored. No acute distress. Lidocaine patch applied to left shoulder for chronic pain. Safety maintained. Patient stable upon exiting the room. Break RN note- Patient resting quietly in bed, breathing even and nonlabored. A&Ox2 (oriented to self and place). No acute distress. Lidocaine patch applied to left shoulder for chronic pain. Safety maintained. Patient stable upon exiting the room.

## 2023-02-21 NOTE — ED POST DISCHARGE NOTE - RESULT SUMMARY
HOLLIE Liao: Urine culture greater than 100,000 E. coli, patient seen for altered mental status, has a history of dementia.  Spoke with patient's daughter, states patient has been okay, mental status at baseline.  Discussed urine culture and starting antibiotics now given the fact that the patient is a poor historian versus following up with primary care doctor.  Daughter prefers to start antibiotics now because their next appointment with PMD is in 1 month.  No fever or vomiting.  Allergic to penicillin.  We will start Bactrim 1 tablet twice a day for 5 days.  Return precautions given

## 2023-08-03 NOTE — ED PROVIDER NOTE - RESPIRATORY DISTRESS
Daily Note     Today's date: 8/3/2023  Patient name: Claudio Sauceda  : 1956  MRN: [id number]  Referring provider: Jason Dugan MD  Dx:   Encounter Diagnosis     ICD-10-CM    1. Generalized weakness  R53.1           Start Time: 1530  Stop Time: 1615  Total time in clinic (min): 45 minutes     Subjective: Pt reports improvements with tolerance to strenuous activities. Objective: See treatment diary below      Assessment: Tolerated treatment well. Pt's POC was progressed to include more dynamic balance interventions. Patient demonstrated fatigue post treatment and would benefit from continued PT      Plan: Continue per plan of care.       Precautions: Stage IV cancer, thoracic metastasis avoid lifting from flexed positions, NO GRADE 5 MOBS, avoid bridges      Manuals 7/25 8/3                                                               Neuro Re-Ed             SLR HEP 2*10 b/l           Mini squats HEP 2*10           Standing hip abd HEP 2*10 b/l           LTR HEP 15*10" b/l           Tandem stance  3*30"           Low rows                          Ther Ex             Nu step ROM  10'           Leg press  3*10 95#                                                                                         Ther Activity                                       Gait Training                                       Modalities no

## 2023-12-26 NOTE — PATIENT PROFILE ADULT - NSPRESCRUSEDDRG_GEN_A_NUR
A time out was preformed identifying the correct procedure, the correct location with the nursing staff.  The right wrist was prepped with 2% chlorhexidine and draped with a sterile sheet in the usual fashion. 1% lidocaine was administered subcutaneously for local anesthesia. The Right radial artery was cannulated and a catheter was placed over a wire, with return of pulsatile red blood. The catheter was secured in place and a sterile dressing was applied over the site prior to removal of drapes. The line flushes and draws back blood easily. The catheter was attached to a monitor, which revealed an appropriate arterial waveform.     The patient tolerated the procedure well and there were no complications.  Estimated blood loss: 1 ml   
No

## 2024-01-17 NOTE — ED ADULT TRIAGE NOTE - CHIEF COMPLAINT QUOTE
Lab Results   Component Value Date    HGBA1C 6.5 (H) 01/12/2024   Well controlled  Due for eye exam  Plan to see ophthal   
Elevated in the office today  At home, it's usually 130s/70s  Continue amlodipine and losartan  
LDL more elevated  Discussed lifestyle modification, vs increase lipitor  After shared decision making will repeat lipid panel if still elevated will increase lipitor  
Vaccinations:UTD on the latest flu shot and covid booster; discussed rsv vaccine; UTD on PCV 20; had both doses shingrix; reports had tetanus vaccine over past 10y  Advance care planning:ACP offered to pt.   Colon cancer screen:colonoscopy 2/2022, repeat in 5y  Healthy diet and regular exercise    
Pt presents to ED for dizziness since last night. Pt states she fell down yesterday when attempting to get into bed and hit her head. Denies LOC. Pt states "I just feel tired and feel pressure in my chest." Denies headache, chest pain, palpitations, fevers, chills. Pmhx of DM and HTN.   Casandra Ponce (Granddaughter): [phone]

## 2024-04-01 NOTE — ED CDU PROVIDER SUBSEQUENT DAY NOTE - PSH
Isela Hernandez is a 55 year old male here alone presenting with:    Symptoms: pt has cough, headache, nausea, fatigued, body aches, sinus congestion, sore throat x3 days    Patient's son tested positive for influenza B a couple days ago. Patient lives with his son    Denies: has not checked temperature, vomiting, diarrhea    OTC medications: Robitussin DM which has not helped    Recent ABX use: doxycycline on 3/20 for pneumonia     Work/school note needed: yes    Patient would like communication of their results via:    [employer]    Visit Vitals  /76   Pulse 72   Temp 98 °F (36.7 °C) (Temporal)   Resp 18   SpO2 98%       Disorder of nasal sinus     Yes

## 2024-04-18 NOTE — ED ADULT NURSE NOTE - HISTORY OF COVID-19 VACCINATION
PAST MEDICAL & SURGICAL HISTORY:  Scalp psoriasis      Congestive heart failure      Type 2 diabetes mellitus      Atrial fibrillation      H/O umbilical hernia repair      S/P appendectomy      S/P tonsillectomy       Vaccine status unknown

## 2024-10-29 ENCOUNTER — INPATIENT (INPATIENT)
Facility: HOSPITAL | Age: 89
LOS: 2 days | Discharge: HOME HEALTH | End: 2024-11-01
Attending: STUDENT IN AN ORGANIZED HEALTH CARE EDUCATION/TRAINING PROGRAM | Admitting: STUDENT IN AN ORGANIZED HEALTH CARE EDUCATION/TRAINING PROGRAM
Payer: MEDICARE

## 2024-10-29 VITALS
RESPIRATION RATE: 18 BRPM | OXYGEN SATURATION: 98 % | HEART RATE: 78 BPM | HEIGHT: 60 IN | SYSTOLIC BLOOD PRESSURE: 131 MMHG | TEMPERATURE: 98 F | DIASTOLIC BLOOD PRESSURE: 86 MMHG | WEIGHT: 141.98 LBS

## 2024-10-29 DIAGNOSIS — R79.89 OTHER SPECIFIED ABNORMAL FINDINGS OF BLOOD CHEMISTRY: ICD-10-CM

## 2024-10-29 DIAGNOSIS — E78.5 HYPERLIPIDEMIA, UNSPECIFIED: ICD-10-CM

## 2024-10-29 DIAGNOSIS — S43.006A UNSPECIFIED DISLOCATION OF UNSPECIFIED SHOULDER JOINT, INITIAL ENCOUNTER: ICD-10-CM

## 2024-10-29 DIAGNOSIS — M79.89 OTHER SPECIFIED SOFT TISSUE DISORDERS: ICD-10-CM

## 2024-10-29 DIAGNOSIS — D75.1 SECONDARY POLYCYTHEMIA: ICD-10-CM

## 2024-10-29 DIAGNOSIS — J34.9 UNSPECIFIED DISORDER OF NOSE AND NASAL SINUSES: Chronic | ICD-10-CM
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
## 2024-10-29 DIAGNOSIS — E11.9 TYPE 2 DIABETES MELLITUS WITHOUT COMPLICATIONS: ICD-10-CM

## 2024-10-29 DIAGNOSIS — I10 ESSENTIAL (PRIMARY) HYPERTENSION: ICD-10-CM

## 2024-10-29 DIAGNOSIS — Z29.9 ENCOUNTER FOR PROPHYLACTIC MEASURES, UNSPECIFIED: ICD-10-CM

## 2024-10-29 LAB
ALBUMIN SERPL ELPH-MCNC: 2.8 G/DL — LOW (ref 3.3–5)
ALP SERPL-CCNC: 87 U/L — SIGNIFICANT CHANGE UP (ref 40–120)
ALT FLD-CCNC: 33 U/L — SIGNIFICANT CHANGE UP (ref 12–78)
ANION GAP SERPL CALC-SCNC: 8 MMOL/L — SIGNIFICANT CHANGE UP (ref 5–17)
APTT BLD: 28.1 SEC — SIGNIFICANT CHANGE UP (ref 24.5–35.6)
AST SERPL-CCNC: 32 U/L — SIGNIFICANT CHANGE UP (ref 15–37)
BASOPHILS # BLD AUTO: 0.01 K/UL — SIGNIFICANT CHANGE UP (ref 0–0.2)
BASOPHILS NFR BLD AUTO: 0.1 % — SIGNIFICANT CHANGE UP (ref 0–2)
BILIRUB SERPL-MCNC: 0.3 MG/DL — SIGNIFICANT CHANGE UP (ref 0.2–1.2)
BLD GP AB SCN SERPL QL: SIGNIFICANT CHANGE UP
BUN SERPL-MCNC: 18 MG/DL — SIGNIFICANT CHANGE UP (ref 7–23)
CALCIUM SERPL-MCNC: 9.4 MG/DL — SIGNIFICANT CHANGE UP (ref 8.5–10.1)
CHLORIDE SERPL-SCNC: 105 MMOL/L — SIGNIFICANT CHANGE UP (ref 96–108)
CO2 SERPL-SCNC: 26 MMOL/L — SIGNIFICANT CHANGE UP (ref 22–31)
CREAT SERPL-MCNC: 1.06 MG/DL — SIGNIFICANT CHANGE UP (ref 0.5–1.3)
EGFR: 50 ML/MIN/1.73M2 — LOW
EOSINOPHIL # BLD AUTO: 0.02 K/UL — SIGNIFICANT CHANGE UP (ref 0–0.5)
EOSINOPHIL NFR BLD AUTO: 0.2 % — SIGNIFICANT CHANGE UP (ref 0–6)
GLUCOSE BLDC GLUCOMTR-MCNC: 127 MG/DL — HIGH (ref 70–99)
GLUCOSE SERPL-MCNC: 181 MG/DL — HIGH (ref 70–99)
HCT VFR BLD CALC: 46.7 % — HIGH (ref 34.5–45)
HGB BLD-MCNC: 15.6 G/DL — HIGH (ref 11.5–15.5)
IMM GRANULOCYTES NFR BLD AUTO: 0.6 % — SIGNIFICANT CHANGE UP (ref 0–0.9)
INR BLD: 0.97 RATIO — SIGNIFICANT CHANGE UP (ref 0.85–1.16)
LYMPHOCYTES # BLD AUTO: 4.69 K/UL — HIGH (ref 1–3.3)
LYMPHOCYTES # BLD AUTO: 48 % — HIGH (ref 13–44)
MCHC RBC-ENTMCNC: 32 PG — SIGNIFICANT CHANGE UP (ref 27–34)
MCHC RBC-ENTMCNC: 33.4 G/DL — SIGNIFICANT CHANGE UP (ref 32–36)
MCV RBC AUTO: 95.9 FL — SIGNIFICANT CHANGE UP (ref 80–100)
MONOCYTES # BLD AUTO: 0.48 K/UL — SIGNIFICANT CHANGE UP (ref 0–0.9)
MONOCYTES NFR BLD AUTO: 4.9 % — SIGNIFICANT CHANGE UP (ref 2–14)
NEUTROPHILS # BLD AUTO: 4.51 K/UL — SIGNIFICANT CHANGE UP (ref 1.8–7.4)
NEUTROPHILS NFR BLD AUTO: 46.2 % — SIGNIFICANT CHANGE UP (ref 43–77)
NRBC # BLD: 0 /100 WBCS — SIGNIFICANT CHANGE UP (ref 0–0)
PLATELET # BLD AUTO: 293 K/UL — SIGNIFICANT CHANGE UP (ref 150–400)
POTASSIUM SERPL-MCNC: 3.4 MMOL/L — LOW (ref 3.5–5.3)
POTASSIUM SERPL-SCNC: 3.4 MMOL/L — LOW (ref 3.5–5.3)
PROT SERPL-MCNC: 8.1 GM/DL — SIGNIFICANT CHANGE UP (ref 6–8.3)
PROTHROM AB SERPL-ACNC: 11.2 SEC — SIGNIFICANT CHANGE UP (ref 9.9–13.4)
RBC # BLD: 4.87 M/UL — SIGNIFICANT CHANGE UP (ref 3.8–5.2)
RBC # FLD: 16 % — HIGH (ref 10.3–14.5)
SODIUM SERPL-SCNC: 139 MMOL/L — SIGNIFICANT CHANGE UP (ref 135–145)
TROPONIN I, HIGH SENSITIVITY RESULT: 369.2 NG/L — HIGH
TROPONIN I, HIGH SENSITIVITY RESULT: 375.5 NG/L — HIGH
WBC # BLD: 9.77 K/UL — SIGNIFICANT CHANGE UP (ref 3.8–10.5)
WBC # FLD AUTO: 9.77 K/UL — SIGNIFICANT CHANGE UP (ref 3.8–10.5)

## 2024-10-29 PROCEDURE — 99222 1ST HOSP IP/OBS MODERATE 55: CPT

## 2024-10-29 PROCEDURE — 93010 ELECTROCARDIOGRAM REPORT: CPT

## 2024-10-29 PROCEDURE — 71045 X-RAY EXAM CHEST 1 VIEW: CPT | Mod: 26

## 2024-10-29 PROCEDURE — 73030 X-RAY EXAM OF SHOULDER: CPT | Mod: 26,RT,77

## 2024-10-29 PROCEDURE — 73030 X-RAY EXAM OF SHOULDER: CPT | Mod: 26,RT

## 2024-10-29 PROCEDURE — 23650 CLTX SHO DSLC W/MNPJ WO ANES: CPT | Mod: RT

## 2024-10-29 PROCEDURE — 73060 X-RAY EXAM OF HUMERUS: CPT | Mod: 26,RT

## 2024-10-29 PROCEDURE — 73070 X-RAY EXAM OF ELBOW: CPT | Mod: 26,RT

## 2024-10-29 PROCEDURE — 99284 EMERGENCY DEPT VISIT MOD MDM: CPT

## 2024-10-29 RX ORDER — GLUCAGON INJECTION, SOLUTION 1 MG/.2ML
1 INJECTION, SOLUTION SUBCUTANEOUS ONCE
Refills: 0 | Status: DISCONTINUED | OUTPATIENT
Start: 2024-10-29 | End: 2024-11-01

## 2024-10-29 RX ORDER — LORAZEPAM 2 MG
2 TABLET ORAL ONCE
Refills: 0 | Status: DISCONTINUED | OUTPATIENT
Start: 2024-10-29 | End: 2024-10-29

## 2024-10-29 RX ORDER — MAGNESIUM, ALUMINUM HYDROXIDE 200-200 MG
30 TABLET,CHEWABLE ORAL EVERY 4 HOURS
Refills: 0 | Status: DISCONTINUED | OUTPATIENT
Start: 2024-10-29 | End: 2024-11-01

## 2024-10-29 RX ORDER — HYDROXYUREA 500 MG
500 CAPSULE ORAL DAILY
Refills: 0 | Status: DISCONTINUED | OUTPATIENT
Start: 2024-10-30 | End: 2024-11-01

## 2024-10-29 RX ORDER — ASPIRIN/MAG CARB/ALUMINUM AMIN 325 MG
81 TABLET ORAL DAILY
Refills: 0 | Status: DISCONTINUED | OUTPATIENT
Start: 2024-10-29 | End: 2024-11-01

## 2024-10-29 RX ORDER — MORPHINE SULFATE 30 MG/1
4 TABLET, EXTENDED RELEASE ORAL ONCE
Refills: 0 | Status: DISCONTINUED | OUTPATIENT
Start: 2024-10-29 | End: 2024-10-29

## 2024-10-29 RX ORDER — HYDROXYUREA 500 MG
15 CAPSULE ORAL
Refills: 0 | DISCHARGE

## 2024-10-29 RX ORDER — ENOXAPARIN SODIUM 40MG/0.4ML
40 SYRINGE (ML) SUBCUTANEOUS EVERY 24 HOURS
Refills: 0 | Status: DISCONTINUED | OUTPATIENT
Start: 2024-10-29 | End: 2024-11-01

## 2024-10-29 RX ORDER — ASPIRIN/MAG CARB/ALUMINUM AMIN 325 MG
1 TABLET ORAL
Refills: 0 | DISCHARGE

## 2024-10-29 RX ORDER — HYDRALAZINE HYDROCHLORIDE 50 MG/1
50 TABLET, FILM COATED ORAL
Refills: 0 | Status: DISCONTINUED | OUTPATIENT
Start: 2024-10-30 | End: 2024-11-01

## 2024-10-29 RX ORDER — ONDANSETRON HYDROCHLORIDE 2 MG/ML
4 INJECTION, SOLUTION INTRAMUSCULAR; INTRAVENOUS EVERY 8 HOURS
Refills: 0 | Status: DISCONTINUED | OUTPATIENT
Start: 2024-10-29 | End: 2024-11-01

## 2024-10-29 RX ORDER — ACETAMINOPHEN 500 MG
650 TABLET ORAL EVERY 6 HOURS
Refills: 0 | Status: DISCONTINUED | OUTPATIENT
Start: 2024-10-29 | End: 2024-11-01

## 2024-10-29 RX ORDER — INSULIN LISPRO 100/ML
VIAL (ML) SUBCUTANEOUS AT BEDTIME
Refills: 0 | Status: DISCONTINUED | OUTPATIENT
Start: 2024-10-29 | End: 2024-11-01

## 2024-10-29 RX ORDER — LOSARTAN POTASSIUM 25 MG/1
100 TABLET ORAL DAILY
Refills: 0 | Status: DISCONTINUED | OUTPATIENT
Start: 2024-10-30 | End: 2024-11-01

## 2024-10-29 RX ORDER — MELATONIN 5 MG
3 TABLET ORAL AT BEDTIME
Refills: 0 | Status: DISCONTINUED | OUTPATIENT
Start: 2024-10-29 | End: 2024-11-01

## 2024-10-29 RX ORDER — INSULIN LISPRO 100/ML
VIAL (ML) SUBCUTANEOUS
Refills: 0 | Status: DISCONTINUED | OUTPATIENT
Start: 2024-10-29 | End: 2024-11-01

## 2024-10-29 RX ORDER — MEMANTINE HYDROCHLORIDE 21 MG/1
5 CAPSULE, EXTENDED RELEASE ORAL DAILY
Refills: 0 | Status: DISCONTINUED | OUTPATIENT
Start: 2024-10-29 | End: 2024-11-01

## 2024-10-29 RX ORDER — HYDRALAZINE HYDROCHLORIDE 50 MG/1
1 TABLET, FILM COATED ORAL
Refills: 0 | DISCHARGE

## 2024-10-29 RX ORDER — POTASSIUM CHLORIDE 10 MEQ
20 TABLET, EXTENDED RELEASE ORAL ONCE
Refills: 0 | Status: COMPLETED | OUTPATIENT
Start: 2024-10-29 | End: 2024-10-29

## 2024-10-29 RX ORDER — HYDROMORPHONE HCL/0.9% NACL/PF 6 MG/30 ML
1 PATIENT CONTROLLED ANALGESIA SYRINGE INTRAVENOUS ONCE
Refills: 0 | Status: DISCONTINUED | OUTPATIENT
Start: 2024-10-29 | End: 2024-10-29

## 2024-10-29 RX ORDER — AMLODIPINE BESYLATE 10 MG
10 TABLET ORAL DAILY
Refills: 0 | Status: DISCONTINUED | OUTPATIENT
Start: 2024-10-30 | End: 2024-11-01

## 2024-10-29 RX ORDER — EMPAGLIFLOZIN 25 MG/1
1 TABLET, FILM COATED ORAL
Refills: 0 | DISCHARGE

## 2024-10-29 RX ORDER — MORPHINE SULFATE 30 MG/1
2 TABLET, EXTENDED RELEASE ORAL EVERY 6 HOURS
Refills: 0 | Status: DISCONTINUED | OUTPATIENT
Start: 2024-10-29 | End: 2024-11-01

## 2024-10-29 RX ADMIN — Medication 20 MILLIEQUIVALENT(S): at 23:26

## 2024-10-29 RX ADMIN — Medication 1 MILLIGRAM(S): at 14:32

## 2024-10-29 RX ADMIN — Medication 2 MILLIGRAM(S): at 16:26

## 2024-10-29 RX ADMIN — MORPHINE SULFATE 4 MILLIGRAM(S): 30 TABLET, EXTENDED RELEASE ORAL at 14:30

## 2024-10-29 RX ADMIN — Medication 500 MILLILITER(S): at 23:16

## 2024-10-29 RX ADMIN — Medication 1 MILLIGRAM(S): at 01:15

## 2024-10-29 RX ADMIN — MORPHINE SULFATE 4 MILLIGRAM(S): 30 TABLET, EXTENDED RELEASE ORAL at 13:57

## 2024-10-29 NOTE — H&P ADULT - NSICDXPASTMEDICALHX_GEN_ALL_CORE_FT
PAST MEDICAL HISTORY:  DM (diabetes mellitus)     GERD (gastroesophageal reflux disease)     HTN (hypertension)     Polycythemia vera

## 2024-10-29 NOTE — H&P ADULT - PROBLEM SELECTOR PLAN 4
- Recently diagnosed ERIC-2 positive polycythemia vera  - P/w Hgb 15.6  - C/w home Hydroxyurea 500mg daily, ASA - Patient p/w RLE swelling  - Daughter reports chronic but does not know specifically  - F/u US duplex LEs to r/o DVT

## 2024-10-29 NOTE — H&P ADULT - ASSESSMENT
Patient is a 89F, with PMHx of Polycythemia vera(JAK2), HTN, HLD, DM, Dementia, Hypothyroidism, who comes in due to a mechanical fall resulting in right shoulder pain. Found to have anterior dislocation of R shoulder. Admitted for elevated troponin.

## 2024-10-29 NOTE — ED ADULT NURSE NOTE - NSFALLHARMRISKINTERV_ED_ALL_ED

## 2024-10-29 NOTE — ED PROVIDER NOTE - CLINICAL SUMMARY MEDICAL DECISION MAKING FREE TEXT BOX
89F PMH HTN, HLD, DM, dementia, hypothyroid BIBEMS accompanied by daughter d/t RUE pain / deformity s/p fall PTA. Afebrile, VSS. Pt appears uncomfortable / in pain. Exam as noted in PE. Plan for pain control, XR imaging, pre-op labs. 89F PMH HTN, HLD, DM, dementia, hypothyroid BIBEMS accompanied by daughter d/t RUE pain / deformity s/p fall PTA. Afebrile, VSS. Pt appears uncomfortable / in pain. Exam as noted in PE. Plan for pain control, XR imaging, pre-op labs.  W/u significant for: + trop 375, ECG NSR w/ RBBB, w/o evidence acute ischemia or arrythmia. XR imaging w/ + R shoulder dislocation. Ortho consulted. Pt care signed out at change of shift, pending reduction and rpt troponin. 89F PMH HTN, HLD, DM, dementia, hypothyroid BIBEMS accompanied by daughter d/t RUE pain / deformity s/p fall PTA. Afebrile, VSS. Pt appears uncomfortable / in pain. Exam as noted in PE. Plan for pain control, XR imaging, pre-op labs.  W/u significant for: + trop 375, ECG NSR w/ RBBB, w/o evidence acute ischemia or arrythmia. XR imaging w/ + R shoulder dislocation. Ortho consulted. Pt care signed out at change of shift, pending reduction and rpt troponin.    [place] DO: reviewed elevated troponin w/ cardiologist Dr Unger, no trend up in troponin, pt now awake after initial ativan and reduction of shoulder, no active chest pain but states she had chest pain yday, will admit, no heparin / dapt per cardiology as of now given no uptrend in trop and no active chest pain or ekg changes

## 2024-10-29 NOTE — ED PROVIDER NOTE - PHYSICAL EXAMINATION
GEN: Awake, alert, interactive, NAD.  HEAD AND NECK: NC/AT. Airway patent. Neck supple.   EYES:  Clear b/l. EOMI. PERRL.   ENT: Moist mucus membranes.   CARDIAC: Regular rate, regular rhythm. No evident pedal edema.    RESP/CHEST: Normal respiratory effort with no use of accessory muscles or retractions. Clear throughout on auscultation.  ABD: Soft, non-distended, non-tender. No rebound, no guarding.   BACK: No midline spinal TTP. No CVAT.   EXTREMITIES: RUE NVI   SKIN: Warm, dry, intact normal color. No rash.   NEURO: AOx3, CN II-XII grossly intact, no focal deficits.   PSYCH: Appropriate mood and affect. GEN: Awake, alert, interactive, NAD.  HEAD AND NECK: NC/AT. Airway patent. Neck supple.   EYES:  Clear b/l. EOMI. PERRL.   ENT: Moist mucus membranes.   CARDIAC: Regular rate, regular rhythm. No evident pedal edema.    RESP/CHEST: Normal respiratory effort with no use of accessory muscles or retractions. Clear throughout on auscultation.  ABD: Soft, non-distended, non-tender. No rebound, no guarding.   BACK: No midline spinal TTP. No CVAT.   EXTREMITIES: RUE NVI, + deformity / TTP R anterior and lateral shoulder.   SKIN: Warm, dry, intact normal color. No rash.   NEURO: AOx3, CN II-XII grossly intact, no focal deficits.   PSYCH: Appropriate mood and affect.

## 2024-10-29 NOTE — ED PROVIDER NOTE - OBJECTIVE STATEMENT
89F PMH HTN, HLD, DM, dementia, hypothyroid BIBEMS accompanied by daughter d/t RUE pain / deformity s/p fall PTA. Per daughter, bringing pt to dental appt, left pt on top of outdoor stairs to put pt walker in car, pt took step down and fell forwards down 3-4 steps, pt holding onto railing and yanked R shoulder. Pt fell forward, no head strike / no LOC. Pt w/ severe RUE pain. EMS placed in sling. Denies other injuries or complaints.     PMH as above, PSH none, Allergy PCN, Meds as listed (not on AC).

## 2024-10-29 NOTE — ED ADULT TRIAGE NOTE - CHIEF COMPLAINT QUOTE
biba from home c/o r shoulder injury with deformity and pain s/p fell backwards down 3-4 steps denies head injury or loc

## 2024-10-29 NOTE — H&P ADULT - PROBLEM SELECTOR PLAN 8
- C/w home lipitor 40mg - Home meds Januvia 100, Repaglinide 0.5mg BID, Jardiance 10mg  - Hold home meds  - ISS + FS achs

## 2024-10-29 NOTE — H&P ADULT - PROBLEM SELECTOR PLAN 7
- Home meds Januvia 100, Repaglinide 0.5mg BID, Jardiance 10mg  - Hold home meds  - ISS + FS achs - AAOx2 at baseline. Currently at baseline  - C/w home memantine 5mg BID

## 2024-10-29 NOTE — H&P ADULT - HISTORY OF PRESENT ILLNESS
Patient is a 89F, with PMHx of Polycythemia vera(JAK2), HTN, HLD, DM, Dementia, Hypothyroidism, who comes in due to a mechanical fall resulting in right shoulder pain. Per daughter, she was bringing the patient for dental appointment. Patient was left on the top of staircase when she went to put the walker in the car. Patient took a step down and fell forwards down 3-4 steps. She was holding onto railing with her right arm. Patient fell forward but there was no LOC or head stroke. Patient had severe right RUE pain. Patient denies headache, fever, chills, nausea, vomit, chest pain, shortness of breath, cough, lightheadedness, abdominal pain, diarrhea, constipation, dysuria.  Yesterday, per daughter, the patient complained of palpitations.

## 2024-10-29 NOTE — H&P ADULT - PROBLEM SELECTOR PLAN 6
- AAOx2 at baseline. Currently at baseline  - C/w home memantine 5mg BID - C/w home amlodipine 10mg, hydralazine 50mg BID, losartan 100mg

## 2024-10-29 NOTE — ED ADULT NURSE NOTE - OBJECTIVE STATEMENT
Patient is an 89 year old female, alert and oriented 2 BIBA s/p fall from home with R shoulder injury. Patient's daughter verbalized, " while I was doing something in the car, she fell in front of our house and landed to her right side, she probably missed a step or two." EMS applied ice to the affected area and immobilized the R arm. PMHX: HTN, HLD, DM2, Hypothyroidism. Patient was screaming upon arrival. Given Morphine 4mg IVP. Applied sling to R arm. Ice applied to R shoulder.

## 2024-10-29 NOTE — ED PROVIDER NOTE - CARE PLAN
1 Principal Discharge DX:	Dislocation of right shoulder joint   Principal Discharge DX:	Dislocation of right shoulder joint  Secondary Diagnosis:	Elevated troponin

## 2024-10-29 NOTE — H&P ADULT - PROBLEM SELECTOR PLAN 5
- C/w home amlodipine 10mg, hydralazine 50mg BID, losartan 100mg - Recently diagnosed ERIC-2 positive polycythemia vera  - P/w Hgb 15.6  - C/w home Hydroxyurea 500mg daily, ASA

## 2024-10-29 NOTE — H&P ADULT - NSHPPHYSICALEXAM_GEN_ALL_CORE
GENERAL: NAD  HEAD: Atraumatic, Normocephalic  EYES: EOMI. Conjunctiva and sclera clear  NECK: Supple  CHEST/LUNG: Clear to auscultation bilaterally; No wheeze, rhonchi, rales  HEART: Regular rate and rhythm; No murmurs  ABDOMEN: Soft, Nontender, Nondistended; Bowel sounds present  EXTREMITIES: Mild edema of RLE. RUE on sling  NEURO: AAOx2  SKIN: No rashes or lesions

## 2024-10-29 NOTE — H&P ADULT - PROBLEM SELECTOR PLAN 3
- Reported palpitation yesterday  - Currently without any symptoms  - Trop 375 -> 369  - EKG = Sinus rhythm with PSC. RA enlargement. RBBB. Cannot rule out Inferior infarct  - Admit to telemetry  - F/u echo  - Cardio Dr. Unger consulted in the ED = No need for urgent intervention. Rec echo given chest pain yesterday. Please call again in the morning

## 2024-10-29 NOTE — H&P ADULT - PROBLEM SELECTOR PLAN 1
- P/w R shoulder pain  - CXR, shoulder = Anterior dislocation of R glenohumeral joint  - Pain control  - NWB R shoulder  - Ortho consulted = s/p closed reduction of R shoulder. Follow up outpatient with Dr. Velia Shearer within 1 week, please call office for appointment. - P/w R shoulder pain  - CXR, shoulder = Anterior dislocation of R glenohumeral joint  - S/p Dilaudid, Morphine, and Ativan in the ED due to pain and restlessness  - Pain control  - NWB R shoulder  - Ortho consulted = s/p closed reduction of R shoulder. Follow up outpatient with Dr. Velia Shearer within 1 week, please call office for appointment.

## 2024-10-30 ENCOUNTER — RESULT REVIEW (OUTPATIENT)
Age: 89
End: 2024-10-30

## 2024-10-30 LAB
ALBUMIN SERPL ELPH-MCNC: 2.1 G/DL — LOW (ref 3.3–5)
ALP SERPL-CCNC: 77 U/L — SIGNIFICANT CHANGE UP (ref 40–120)
ALT FLD-CCNC: 30 U/L — SIGNIFICANT CHANGE UP (ref 12–78)
ANION GAP SERPL CALC-SCNC: 7 MMOL/L — SIGNIFICANT CHANGE UP (ref 5–17)
AST SERPL-CCNC: 58 U/L — HIGH (ref 15–37)
BILIRUB SERPL-MCNC: 0.6 MG/DL — SIGNIFICANT CHANGE UP (ref 0.2–1.2)
BUN SERPL-MCNC: 18 MG/DL — SIGNIFICANT CHANGE UP (ref 7–23)
CALCIUM SERPL-MCNC: 8.6 MG/DL — SIGNIFICANT CHANGE UP (ref 8.5–10.1)
CHLORIDE SERPL-SCNC: 109 MMOL/L — HIGH (ref 96–108)
CO2 SERPL-SCNC: 23 MMOL/L — SIGNIFICANT CHANGE UP (ref 22–31)
CREAT SERPL-MCNC: 0.85 MG/DL — SIGNIFICANT CHANGE UP (ref 0.5–1.3)
EGFR: 65 ML/MIN/1.73M2 — SIGNIFICANT CHANGE UP
GLUCOSE BLDC GLUCOMTR-MCNC: 124 MG/DL — HIGH (ref 70–99)
GLUCOSE BLDC GLUCOMTR-MCNC: 127 MG/DL — HIGH (ref 70–99)
GLUCOSE BLDC GLUCOMTR-MCNC: 168 MG/DL — HIGH (ref 70–99)
GLUCOSE BLDC GLUCOMTR-MCNC: 208 MG/DL — HIGH (ref 70–99)
GLUCOSE SERPL-MCNC: 125 MG/DL — HIGH (ref 70–99)
HCT VFR BLD CALC: 44.4 % — SIGNIFICANT CHANGE UP (ref 34.5–45)
HGB BLD-MCNC: 14.6 G/DL — SIGNIFICANT CHANGE UP (ref 11.5–15.5)
MCHC RBC-ENTMCNC: 32.3 PG — SIGNIFICANT CHANGE UP (ref 27–34)
MCHC RBC-ENTMCNC: 32.9 G/DL — SIGNIFICANT CHANGE UP (ref 32–36)
MCV RBC AUTO: 98.2 FL — SIGNIFICANT CHANGE UP (ref 80–100)
NRBC # BLD: 0 /100 WBCS — SIGNIFICANT CHANGE UP (ref 0–0)
PLATELET # BLD AUTO: 226 K/UL — SIGNIFICANT CHANGE UP (ref 150–400)
POTASSIUM SERPL-MCNC: 4.5 MMOL/L — SIGNIFICANT CHANGE UP (ref 3.5–5.3)
POTASSIUM SERPL-SCNC: 4.5 MMOL/L — SIGNIFICANT CHANGE UP (ref 3.5–5.3)
PROT SERPL-MCNC: 7.1 GM/DL — SIGNIFICANT CHANGE UP (ref 6–8.3)
RBC # BLD: 4.52 M/UL — SIGNIFICANT CHANGE UP (ref 3.8–5.2)
RBC # FLD: 16.5 % — HIGH (ref 10.3–14.5)
SODIUM SERPL-SCNC: 139 MMOL/L — SIGNIFICANT CHANGE UP (ref 135–145)
WBC # BLD: 9.7 K/UL — SIGNIFICANT CHANGE UP (ref 3.8–10.5)
WBC # FLD AUTO: 9.7 K/UL — SIGNIFICANT CHANGE UP (ref 3.8–10.5)

## 2024-10-30 PROCEDURE — 93306 TTE W/DOPPLER COMPLETE: CPT | Mod: 26

## 2024-10-30 PROCEDURE — 93970 EXTREMITY STUDY: CPT | Mod: 26

## 2024-10-30 PROCEDURE — 99232 SBSQ HOSP IP/OBS MODERATE 35: CPT

## 2024-10-30 RX ORDER — INFLUENZ VIR VAC TV P-SURF2003 15MCG/.5ML
0.5 SYRINGE (ML) INTRAMUSCULAR ONCE
Refills: 0 | Status: DISCONTINUED | OUTPATIENT
Start: 2024-10-30 | End: 2024-11-01

## 2024-10-30 RX ADMIN — Medication 40 MILLIGRAM(S): at 05:23

## 2024-10-30 RX ADMIN — MEMANTINE HYDROCHLORIDE 5 MILLIGRAM(S): 21 CAPSULE, EXTENDED RELEASE ORAL at 11:31

## 2024-10-30 RX ADMIN — MORPHINE SULFATE 2 MILLIGRAM(S): 30 TABLET, EXTENDED RELEASE ORAL at 10:30

## 2024-10-30 RX ADMIN — Medication 81 MILLIGRAM(S): at 11:31

## 2024-10-30 RX ADMIN — Medication 650 MILLIGRAM(S): at 20:27

## 2024-10-30 RX ADMIN — MORPHINE SULFATE 2 MILLIGRAM(S): 30 TABLET, EXTENDED RELEASE ORAL at 09:35

## 2024-10-30 RX ADMIN — HYDRALAZINE HYDROCHLORIDE 50 MILLIGRAM(S): 50 TABLET, FILM COATED ORAL at 05:20

## 2024-10-30 RX ADMIN — Medication 40 MILLIGRAM(S): at 21:14

## 2024-10-30 RX ADMIN — Medication 500 MILLIGRAM(S): at 11:31

## 2024-10-30 RX ADMIN — Medication 10 MILLIGRAM(S): at 05:20

## 2024-10-30 RX ADMIN — Medication 1: at 11:30

## 2024-10-30 RX ADMIN — Medication 650 MILLIGRAM(S): at 21:30

## 2024-10-30 RX ADMIN — HYDRALAZINE HYDROCHLORIDE 50 MILLIGRAM(S): 50 TABLET, FILM COATED ORAL at 17:07

## 2024-10-30 RX ADMIN — LOSARTAN POTASSIUM 100 MILLIGRAM(S): 25 TABLET ORAL at 05:22

## 2024-10-30 RX ADMIN — Medication 2: at 17:08

## 2024-10-30 NOTE — PATIENT PROFILE ADULT - NSPROPTRIGHTSUPPORTNAME_GEN_A_NUR
Spoke to Ky Schroeder the HCP  this morning and discussed in details the risk and benefits of PLEX in GBS, and use of albumin for PLEX. All questions answered.    She did not consent for procedure, she would like a definitive diagnosis with LP before she can consent for PLEX. Although she agreed to the use of albumin (blood fractionation component) if PLEX has to be performed after diagnosing GBS    Please call blood bank with additional questions or PLEX is needed.    D/w Dr Garza Bela Rangel

## 2024-10-30 NOTE — PATIENT PROFILE ADULT - FALL HARM RISK - HARM RISK INTERVENTIONS
Assistance with ambulation/Assistance OOB with selected safe patient handling equipment/Communicate Risk of Fall with Harm to all staff/Discuss with provider need for PT consult/Monitor gait and stability/Reinforce activity limits and safety measures with patient and family/Tailored Fall Risk Interventions/Visual Cue: Yellow wristband and red socks/Bed in lowest position, wheels locked, appropriate side rails in place/Call bell, personal items and telephone in reach/Instruct patient to call for assistance before getting out of bed or chair/Non-slip footwear when patient is out of bed/[place] to call system/Physically safe environment - no spills, clutter or unnecessary equipment/Purposeful Proactive Rounding/Room/bathroom lighting operational, light cord in reach Assistance with ambulation/Assistance OOB with selected safe patient handling equipment/Communicate Risk of Fall with Harm to all staff/Discuss with provider need for PT consult/Monitor gait and stability/Provide patient with walking aids - walker, cane, crutches/Reinforce activity limits and safety measures with patient and family/Tailored Fall Risk Interventions/Visual Cue: Yellow wristband and red socks/Bed in lowest position, wheels locked, appropriate side rails in place/Call bell, personal items and telephone in reach/Instruct patient to call for assistance before getting out of bed or chair/Non-slip footwear when patient is out of bed/[place] to call system/Physically safe environment - no spills, clutter or unnecessary equipment/Purposeful Proactive Rounding/Room/bathroom lighting operational, light cord in reach

## 2024-10-30 NOTE — PATIENT PROFILE ADULT - NSPROHMSYMPCOND_GEN_A_NUR
Department of Anesthesiology  Preprocedure Note       Name:  Melisa Miranda   Age:  60 y.o.  :  1957                                          MRN:  [id number]         Date:  2023      Surgeon: Joe Sweeney):  Azeb Kuhn MD    Procedure: Procedure(s):  EGD W/ANES.     Medications prior to admission:   Prior to Admission medications    Not on File       Current medications:    Current Facility-Administered Medications   Medication Dose Route Frequency Provider Last Rate Last Admin    pantoprazole (PROTONIX) injection 40 mg  40 mg IntraVENous BID AC Agustin Fuchs MD   40 mg at 23 0629    piperacillin-tazobactam (ZOSYN) 3,375 mg in sodium chloride 0.9 % 100 mL extended IVPB (mini-bag)  3,375 mg IntraVENous Q8H Susana Miller MD 25 mL/hr at 23 0809 3,375 mg at 23 0809    linezolid (ZYVOX) tablet 600 mg  600 mg Oral 2 times per day Susana Miller MD   600 mg at 23 0814    HYDROmorphone (DILAUDID) injection 0.5 mg  0.5 mg IntraVENous Once [place], [place]        balsum peru-castor oil (VENELEX) ointment   Topical BID Kg Locke MD   Given at 23 210    potassium chloride (KLOR-CON M) extended release tablet 40 mEq  40 mEq Oral PRN Barbara Diggs MD   40 mEq at 23 6970    Or    potassium bicarb-citric acid (EFFER-K) effervescent tablet 40 mEq  40 mEq Oral PRN Barbara Diggs MD        Or    potassium chloride 10 mEq/100 mL IVPB (Peripheral Line)  10 mEq IntraVENous PRN Barbara Diggs MD        magnesium sulfate 2000 mg in 50 mL IVPB premix  2,000 mg IntraVENous PRN Barbara Diggs MD   Stopped at 23 1055    0.9 % sodium chloride infusion   IntraVENous PRN Barbara Diggs MD   Stopped at 23 1152    sodium chloride flush 0.9 % injection 5-40 mL  5-40 mL IntraVENous 2 times per day Barbara Diggs MD   10 mL at 23 0948    sodium chloride flush 0.9 % injection 5-40 mL  5-40 mL IntraVENous PRN Miguel Moeller cardiovascular/diabetes

## 2024-10-30 NOTE — PHYSICAL THERAPY INITIAL EVALUATION ADULT - PATIENT/FAMILY AGREES WITH PLAN
Home with P.T and prior HCS/yes Bilobed Flap Text: The defect edges were debeveled with a #15 scalpel blade.  Given the location of the defect and the proximity to free margins a bilobe flap was deemed most appropriate.  Using a sterile surgical marker, an appropriate bilobe flap drawn around the defect.    The area thus outlined was incised deep to adipose tissue with a #15 scalpel blade.  The skin margins were undermined to an appropriate distance in all directions utilizing iris scissors.

## 2024-10-30 NOTE — PHYSICAL THERAPY INITIAL EVALUATION ADULT - ADDITIONAL COMMENTS
pt lives in a private home with her dtr's family has 3 steps in enter and nadege HR, pt PLOF is indep in bed mob, transfers and ambulation, pt uses SAC in house and RW for community amb, pt s/p fell down 3-4 steps at home. x-ray shows right shoulder dislocation, pt is NWB RUE, pt currently requires min a for bed  mob and transfers, able to amb with min a x 80 ft, presents with antalgic gait sec to pain. pt has HHA 8hrs/day x 4 day / week. pt aaox4, assisted back to bed and educated on NWB to right UE, verbalazed understanding.

## 2024-10-30 NOTE — PATIENT PROFILE ADULT - FUNCTIONAL ASSESSMENT - BASIC MOBILITY 6.
Not able to assess (calculate score using AMPAC averaging method)  2-calculated by average/Not able to assess (calculate score using [facility] averaging method)

## 2024-10-30 NOTE — PHYSICAL THERAPY INITIAL EVALUATION ADULT - GENERAL OBSERVATIONS, REHAB EVAL
0 = swallows foods/liquids without difficulty
pt encountered in bed supine with RUE shoulder sling. pt on cardiac monitor

## 2024-10-31 ENCOUNTER — TRANSCRIPTION ENCOUNTER (OUTPATIENT)
Age: 89
End: 2024-10-31

## 2024-10-31 LAB
GLUCOSE BLDC GLUCOMTR-MCNC: 110 MG/DL — HIGH (ref 70–99)
GLUCOSE BLDC GLUCOMTR-MCNC: 116 MG/DL — HIGH (ref 70–99)
GLUCOSE BLDC GLUCOMTR-MCNC: 128 MG/DL — HIGH (ref 70–99)
GLUCOSE BLDC GLUCOMTR-MCNC: 287 MG/DL — HIGH (ref 70–99)
TROPONIN I, HIGH SENSITIVITY RESULT: 910.9 NG/L — HIGH

## 2024-10-31 PROCEDURE — 93010 ELECTROCARDIOGRAM REPORT: CPT

## 2024-10-31 PROCEDURE — 99221 1ST HOSP IP/OBS SF/LOW 40: CPT

## 2024-10-31 RX ORDER — MEMANTINE HYDROCHLORIDE 21 MG/1
1 CAPSULE, EXTENDED RELEASE ORAL
Qty: 0 | Refills: 0 | DISCHARGE
Start: 2024-10-31

## 2024-10-31 RX ORDER — SIMETHICONE 80 MG/1
80 TABLET, CHEWABLE ORAL ONCE
Refills: 0 | Status: COMPLETED | OUTPATIENT
Start: 2024-10-31 | End: 2024-10-31

## 2024-10-31 RX ORDER — MEMANTINE HYDROCHLORIDE 21 MG/1
1 CAPSULE, EXTENDED RELEASE ORAL
Refills: 0 | DISCHARGE

## 2024-10-31 RX ORDER — PANTOPRAZOLE SODIUM 40 MG/1
1 TABLET, DELAYED RELEASE ORAL
Qty: 30 | Refills: 0
Start: 2024-10-31 | End: 2024-11-29

## 2024-10-31 RX ORDER — LOSARTAN POTASSIUM 25 MG/1
1 TABLET ORAL
Refills: 0 | DISCHARGE

## 2024-10-31 RX ORDER — PANTOPRAZOLE SODIUM 40 MG/1
1 TABLET, DELAYED RELEASE ORAL
Refills: 0 | DISCHARGE

## 2024-10-31 RX ORDER — LOSARTAN POTASSIUM 25 MG/1
1 TABLET ORAL
Qty: 0 | Refills: 0 | DISCHARGE
Start: 2024-10-31

## 2024-10-31 RX ORDER — AMLODIPINE BESYLATE 10 MG
1 TABLET ORAL
Qty: 0 | Refills: 0 | DISCHARGE
Start: 2024-10-31

## 2024-10-31 RX ORDER — SIMETHICONE 80 MG/1
1 TABLET, CHEWABLE ORAL
Qty: 14 | Refills: 0
Start: 2024-10-31 | End: 2024-11-13

## 2024-10-31 RX ADMIN — Medication 40 MILLIGRAM(S): at 05:52

## 2024-10-31 RX ADMIN — Medication 3: at 11:03

## 2024-10-31 RX ADMIN — Medication 500 MILLIGRAM(S): at 12:37

## 2024-10-31 RX ADMIN — Medication 40 MILLIGRAM(S): at 21:12

## 2024-10-31 RX ADMIN — MEMANTINE HYDROCHLORIDE 5 MILLIGRAM(S): 21 CAPSULE, EXTENDED RELEASE ORAL at 11:03

## 2024-10-31 RX ADMIN — SIMETHICONE 80 MILLIGRAM(S): 80 TABLET, CHEWABLE ORAL at 14:28

## 2024-10-31 RX ADMIN — Medication 10 MILLIGRAM(S): at 05:52

## 2024-10-31 RX ADMIN — HYDRALAZINE HYDROCHLORIDE 50 MILLIGRAM(S): 50 TABLET, FILM COATED ORAL at 17:41

## 2024-10-31 RX ADMIN — Medication 81 MILLIGRAM(S): at 11:03

## 2024-10-31 NOTE — OCCUPATIONAL THERAPY INITIAL EVALUATION ADULT - RANGE OF MOTION, PT EVAL
Pt will have WFL RUE elbow, wrist and hand AROM  in order to perform mobility independently within 4 weeks.

## 2024-10-31 NOTE — CONSULT NOTE ADULT - SUBJECTIVE AND OBJECTIVE BOX
89F HTN, DM, p/w shoulder dislocation and trop 300s.  Asked to evaluate pt for increase in trop to 900.  She has had no CP.  ECG: SR, RBBB. Stable. No changes from admission.  Echo: nl LV function    Allergies    atenolol (Rash)  oxycodone (Sedation/Somnol)  gabapentin (Other)  penicillin (Unknown)    MEDICATIONS:  amLODIPine   Tablet 10 milliGRAM(s) Oral daily  aspirin  chewable 81 milliGRAM(s) Oral daily  enoxaparin Injectable 40 milliGRAM(s) SubCutaneous every 24 hours  hydrALAZINE 50 milliGRAM(s) Oral two times a day  losartan 100 milliGRAM(s) Oral daily      acetaminophen     Tablet .. 650 milliGRAM(s) Oral every 6 hours PRN  melatonin 3 milliGRAM(s) Oral at bedtime PRN  memantine 5 milliGRAM(s) Oral daily  morphine  - Injectable 2 milliGRAM(s) IV Push every 6 hours PRN  ondansetron Injectable 4 milliGRAM(s) IV Push every 8 hours PRN    aluminum hydroxide/magnesium hydroxide/simethicone Suspension 30 milliLiter(s) Oral every 4 hours PRN    atorvastatin 40 milliGRAM(s) Oral at bedtime  dextrose 50% Injectable 25 Gram(s) IV Push once  glucagon  Injectable 1 milliGRAM(s) IntraMuscular once  insulin lispro (ADMELOG) corrective regimen sliding scale   SubCutaneous at bedtime  insulin lispro (ADMELOG) corrective regimen sliding scale   SubCutaneous three times a day before meals    dextrose 5%. 1000 milliLiter(s) IV Continuous <Continuous>  hydroxyurea 500 milliGRAM(s) Oral daily  influenza  Vaccine (HIGH DOSE) 0.5 milliLiter(s) IntraMuscular once      PAST MEDICAL & SURGICAL HISTORY:  HTN (hypertension)      DM (diabetes mellitus)      GERD (gastroesophageal reflux disease)      Polycythemia vera      Disorder of nasal sinus      PHYSICAL EXAM:  T(C): 36.6 (10-31-24 @ 10:16), Max: 37.1 (10-31-24 @ 00:06)  HR: 96 (10-31-24 @ 10:16) (63 - 96)  BP: 124/79 (10-31-24 @ 10:16) (103/64 - 132/77)  RR: 17 (10-31-24 @ 10:16) (17 - 20)  SpO2: 97% (10-31-24 @ 10:16) (93% - 97%)  Wt(kg): --  I&O's Summary    30 Oct 2024 07:01  -  31 Oct 2024 07:00  --------------------------------------------------------  IN: 0 mL / OUT: 1400 mL / NET: -1400 mL    31 Oct 2024 07:01  -  31 Oct 2024 16:15  --------------------------------------------------------  IN: 320 mL / OUT: 0 mL / NET: 320 mL        Appearance: NAD	  Cardiovascular: Normal S1 S2, No JVD, No murmurs, No edema  Respiratory: Lungs clear to auscultation	  Psychiatry: Mood & affect appropriate  Gastrointestinal:  Soft, Non-tender	      LABS:	 	    CBC Full  -  ( 30 Oct 2024 07:25 )  WBC Count : 9.70 K/uL  Hemoglobin : 14.6 g/dL  Hematocrit : 44.4 %  Platelet Count - Automated : 226 K/uL  Mean Cell Volume : 98.2 fl  Mean Cell Hemoglobin : 32.3 pg  Mean Cell Hemoglobin Concentration : 32.9 g/dL  Auto Neutrophil # : x  Auto Lymphocyte # : x  Auto Monocyte # : x  Auto Eosinophil # : x  Auto Basophil # : x  Auto Neutrophil % : x  Auto Lymphocyte % : x  Auto Monocyte % : x  Auto Eosinophil % : x  Auto Basophil % : x    10-30    139  |  109[H]  |  18  ----------------------------<  125[H]  4.5   |  23  |  0.85    Ca    8.6      30 Oct 2024 07:25    TPro  7.1  /  Alb  2.1[L]  /  TBili  0.6  /  DBili  x   /  AST  58[H]  /  ALT  30  /  AlkPhos  77  10-30

## 2024-10-31 NOTE — ED ADULT NURSE NOTE - NS ED NOTE  TALK SOMEONE YN
No PAST MEDICAL HISTORY:  Below knee amputation status, right     DM (diabetes mellitus)     DM (diabetes mellitus)     ESRD on dialysis     HTN (hypertension)     HTN (hypertension)     Kidney disease     MRSA (methicillin resistant Staphylococcus aureus) colonization (hx/o MRSA growth from wound culture)    Stroke     Wound (chronic wounds to left foot and leg)

## 2024-10-31 NOTE — DISCHARGE NOTE PROVIDER - NSDCMRMEDTOKEN_GEN_ALL_CORE_FT
amLODIPine 10 mg oral tablet: 1 tab(s) orally once a day  aspirin 81 mg oral tablet, chewable: 1 tab(s) chewed once a day  atorvastatin 40 mg oral tablet: 1 tab(s) orally once a day (at bedtime)  hydrALAZINE 50 mg oral tablet: 1 tab(s) orally 2 times a day  hydroxyurea 500 mg oral capsule: 15 mg/kg orally once a day  Januvia 100 mg oral tablet: 1 tab(s) orally once a day  Jardiance 10 mg oral tablet: 1 tab(s) orally once a day  losartan 100 mg oral tablet: 1 tab(s) orally once a day  memantine 5 mg oral tablet: 1 tab(s) orally once a day  pantoprazole 40 mg oral delayed release tablet: 1 tab(s) orally once a day as needed for  heartburn  repaglinide 0.5 mg oral tablet: 1 tab(s) orally 2 times a day (with meals)   amLODIPine 10 mg oral tablet: 1 tab(s) orally once a day  aspirin 81 mg oral tablet, chewable: 1 tab(s) chewed once a day  atorvastatin 40 mg oral tablet: 1 tab(s) orally once a day (at bedtime)  hydrALAZINE 50 mg oral tablet: 1 tab(s) orally 2 times a day  hydroxyurea 500 mg oral capsule: 15 mg/kg orally once a day  Januvia 100 mg oral tablet: 1 tab(s) orally once a day  Jardiance 10 mg oral tablet: 1 tab(s) orally once a day  losartan 100 mg oral tablet: 1 tab(s) orally once a day  memantine 5 mg oral tablet: 1 tab(s) orally once a day  pantoprazole 40 mg oral delayed release tablet: 1 tab(s) orally once a day as needed for  heartburn  repaglinide 0.5 mg oral tablet: 1 tab(s) orally 2 times a day (with meals)  simethicone 80 mg oral tablet, chewable: 1 tab(s) orally once a day

## 2024-10-31 NOTE — DISCHARGE NOTE PROVIDER - NSDCCPCAREPLAN_GEN_ALL_CORE_FT
PRINCIPAL DISCHARGE DIAGNOSIS  Diagnosis: Dislocation of right shoulder joint  Assessment and Plan of Treatment: Closed reduction done by orthopedics. Continue home Physical therapy. Follow up with Dr.Tiffany Shearer outpatient in 1 week. call office to make appointment      SECONDARY DISCHARGE DIAGNOSES  Diagnosis: Elevated troponin  Assessment and Plan of Treatment: Asymptomatic. No EKG changes indicating ACS.  No intervention needed     PRINCIPAL DISCHARGE DIAGNOSIS  Diagnosis: Dislocation of right shoulder joint  Assessment and Plan of Treatment: Closed reduction done by orthopedics. Continue home Physical therapy. Follow up with Dr.Tiffany Shearer outpatient in 1 week. call office to make appointment      SECONDARY DISCHARGE DIAGNOSES  Diagnosis: Elevated troponin  Assessment and Plan of Treatment: Asymptomatic. No EKG changes indicating ACS.  No intervention needed    Diagnosis: Dyspepsia  Assessment and Plan of Treatment:

## 2024-10-31 NOTE — DISCHARGE NOTE PROVIDER - NSDCFUADDAPPT_GEN_ALL_CORE_FT
APPTS ARE READY TO BE MADE: [X] YES    Best Family or Patient Contact (if needed):    Additional Information about above appointments (if needed):    1: Call to make appointment for Orthopedic Dr Velia Shearer in 1 week      APPTS ARE READY TO BE MADE: [X] YES    Best Family or Patient Contact (if needed):    Additional Information about above appointments (if needed):    1: Call to make appointment for Orthopedic Dr Velia Shearer in 1 week           Appointment was scheduled in [place] with Velia Bender on 11/8/24 1;15 p.m. at [address] [facility] [phone]

## 2024-10-31 NOTE — DISCHARGE NOTE PROVIDER - HOSPITAL COURSE
Patient is a 89F, with PMHx of Polycythemia vera(JAK2), HTN, HLD, DM, Dementia, Hypothyroidism, who comes in due to a mechanical fall resulting in right shoulder pain. Found to have anterior dislocation of R shoulder. Admitted for elevated troponin.       Problem/Plan - 1:  ·  Problem: Shoulder dislocation.   ·  Plan: - P/w R shoulder pain  - CXR, shoulder = Anterior dislocation of R glenohumeral joint  - S/p Dilaudid, Morphine, and Ativan in the ED due to pain and restlessness  - Pain control  - NWB R shoulder  - Ortho consulted = s/p closed reduction of R shoulder. Follow up outpatient with Dr. Velia Shearer within 1 week, please call office for appointment.  - F/u TTE.     Problem/Plan - 2:  ·  Problem: Fall.   ·  Plan: - Denies head stroke or LOC  - US duplex negative for DVT  - F/u PT.     Problem/Plan - 3:  ·  Problem: Elevated troponin.   ·  Plan: - Reported palpitation yesterday  - Currently without any symptoms  - Trop 375 -> 369  - EKG = Sinus rhythm with PSC. RA enlargement. RBBB. Cannot rule out Inferior infarct  - Admit to telemetry  - F/u echo  - Cardio Dr. Unger consulted in the ED = No need for urgent intervention. Rec echo given chest pain yesterday. Please call again in the morning.  10/31: Pt is asymptomatic. denies chest pain/palpitaions  Repeat EKG 10/31: NSR. No ST T wave changes compared to prior EKG      Problem/Plan - 4:  ·  Problem: Right leg swelling.   ·  Plan: - Patient p/w RLE swelling  - Daughter reports chronic but does not know specifically  - F/u US duplex LEs to r/o DVT.     Problem/Plan - 5:  ·  Problem: Polycythemia.   ·  Plan: - Recently diagnosed ERIC-2 positive polycythemia vera  - P/w Hgb 15.6  - C/w home Hydroxyurea 500mg daily, ASA.     Problem/Plan - 6:  ·  Problem: HTN (hypertension).   ·  Plan: - C/w home amlodipine 10mg, hydralazine 50mg BID, losartan 100mg.     Problem/Plan - 7:  ·  Problem: Dementia.   ·  Plan: - AAOx2 at baseline. Currently at baseline  - C/w home memantine 5mg BID.     Problem/Plan - 8:  ·  Problem: DM (diabetes mellitus).   ·  Plan: - Home meds Januvia 100, Repaglinide 0.5mg BID, Jardiance 10mg  - Hold home meds  - ISS + FS achs.     Problem/Plan - 9:  ·  Problem: HLD (hyperlipidemia).   ·  Plan: - C/w home lipitor 40mg.     Problem/Plan - 10:  ·  Problem: Prophylactic measure.   ·  Plan; - Lovenox.   Patient is a 89F, with PMHx of Polycythemia vera(JAK2), HTN, HLD, DM, Dementia, Hypothyroidism, who comes in due to a mechanical fall resulting in right shoulder pain. Found to have anterior dislocation of R shoulder. Admitted for elevated troponin.     Problem/Plan - 1:  ·  Problem: Shoulder dislocation.   ·  Plan: - P/w R shoulder pain  - CXR, shoulder = Anterior dislocation of R glenohumeral joint  - S/p Dilaudid, Morphine, and Ativan in the ED due to pain and restlessness  - Pain control  - NWB R shoulder  - Ortho consulted = s/p closed reduction of R shoulder. Follow up outpatient with Dr. Velia Shearer within 1 week, please call office for appointment.  - F/u TTE.     Problem/Plan - 2:  ·  Problem: Fall.   ·  Plan: - Denies head stroke or LOC  - US duplex negative for DVT  - F/u PT.     Problem/Plan - 3:  ·  Problem: Elevated troponin.   ·  Plan: - Reported palpitation yesterday  - Currently without any symptoms  - Trop 375 -> 369  - EKG = Sinus rhythm with PSC. RA enlargement. RBBB. Cannot rule out Inferior infarct  - Admit to telemetry  - F/u echo  - Cardio Dr. Unger consulted in the ED = No need for urgent intervention. Rec echo given chest pain yesterday. Please call again in the morning.  10/31: Pt is asymptomatic. denies chest pain/palpitaions  Repeat EKG 10/31: NSR. No ST T wave changes compared to prior EKG      Problem/Plan - 4:  ·  Problem: Right leg swelling.   ·  Plan: - Patient p/w RLE swelling  - Daughter reports chronic but does not know specifically  - F/u US duplex LEs to r/o DVT.     Problem/Plan - 5:  ·  Problem: Polycythemia.   ·  Plan: - Recently diagnosed ERIC-2 positive polycythemia vera  - P/w Hgb 15.6  - C/w home Hydroxyurea 500mg daily, ASA.     Problem/Plan - 6:  ·  Problem: HTN (hypertension).   ·  Plan: - C/w home amlodipine 10mg, hydralazine 50mg BID, losartan 100mg.     Problem/Plan - 7:  ·  Problem: Dementia.   ·  Plan: - AAOx2 at baseline. Currently at baseline  - C/w home memantine 5mg BID.     Problem/Plan - 8:  ·  Problem: DM (diabetes mellitus).   ·  Plan: - Home meds Januvia 100, Repaglinide 0.5mg BID, Jardiance 10mg  - Hold home meds  - ISS + FS achs.     Problem/Plan - 9:  ·  Problem: HLD (hyperlipidemia).   ·  Plan: - C/w home lipitor 40mg.     Problem/Plan - 10:  ·  Problem: Prophylactic measure.   ·  Plan; - Lovenox.

## 2024-10-31 NOTE — OCCUPATIONAL THERAPY INITIAL EVALUATION ADULT - GENERAL OBSERVATIONS, REHAB EVAL
Pt was encountered supine in bed; NAD, portable telemetry +, AXOX1, RUE in sling, followed 1 step commands less then 50% of the time, confused at times, poor safety awareness, cooperative; pt c/o pain in R shoulder, but did not state how much pain.

## 2024-10-31 NOTE — DISCHARGE NOTE PROVIDER - ATTENDING DISCHARGE PHYSICAL EXAMINATION:
Vital Signs Last 24 Hrs  T(F): 97.9 (31 Oct 2024 10:16), Max: 98.7 (31 Oct 2024 00:06)  HR: 96 (31 Oct 2024 10:16) (63 - 96)  BP: 124/79 (31 Oct 2024 10:16) (103/64 - 132/77)  RR: 17 (31 Oct 2024 10:16) (17 - 20)  SpO2: 97% (31 Oct 2024 10:16) (93% - 97%)    Physical Exam:  Constitutional: NAD, awake and alert, well-developed  Neck: Soft and supple, No LAD, No JVD  Respiratory: cta b/l no wheezing no rhonchi  Cardiovascular: +s1/s2 no edema b/l le  Gastrointestinal: soft nt nd bs+  Vascular: 2+ peripheral pulses  Neurological: A/O x 3, no focal deficits

## 2024-10-31 NOTE — DISCHARGE NOTE PROVIDER - CARE PROVIDER_API CALL
Velia Shearer  Orthopaedic Surgery  [address] [facility], [address] [place] [id number]  Phone: [phone]  Fax: [phone]  Follow Up Time: 1 week

## 2024-10-31 NOTE — OCCUPATIONAL THERAPY INITIAL EVALUATION ADULT - ADDITIONAL COMMENTS
Pt is a poor historian; As per PT Evaluation and care coordination note, Pt lives with daughter in a private house with 3-5 steps with rails. Pt ambulates with a SAC inside of the home and uses a rolling walker outside of the home. The pt has a CD-PAP for 8 hours a day 4x a week.

## 2024-10-31 NOTE — OCCUPATIONAL THERAPY INITIAL EVALUATION ADULT - STRENGTHENING, PT EVAL
Pt will increase RUE elbow, wrist and hand strength to 3/5; LUE/BLE strength to 5/5 to improve functional strength needed to engage in functional tasks by 4 weeks

## 2024-10-31 NOTE — OCCUPATIONAL THERAPY INITIAL EVALUATION ADULT - TRANSFER SAFETY CONCERNS NOTED: SIT/STAND, REHAB EVAL
decreased safety awareness/losing balance/decreased sequencing ability/decreased step length/inability to maintain weight-bearing restrictions w/o assist/decreased weight-shifting ability

## 2024-10-31 NOTE — OCCUPATIONAL THERAPY INITIAL EVALUATION ADULT - PHYSICAL ASSIST/NONPHYSICAL ASSIST, REHAB EVAL
Past Medical History:   Diagnosis Date   • Anxiety    • Bilateral sensorineural hearing loss 5/3/11   • Carpal tunnel syndrome     Rt hand   • DDD (degenerative disc disease)    • Deviated nasal septum 7/24/03   • Essential hypertension, benign 12/12/2012   • GERD (gastroesophageal reflux disease)    • Hyperlipidemia 12/12/2012   • Hypertriglyceridemia    • Lymphedema 3/30/09   • Osteoarthritis    • Other chronic sinusitis    • Restrictive lung disease secondary to obesity    • Rotator cuff tear, left 5/12/10   • Sleep apnea 12/12/2012   • Type II or unspecified type diabetes mellitus without mention of complication, not stated as uncontrolled 12/12/2012       Past Surgical History:   Procedure Laterality Date   • COLONOSCOPY W BIOPSY  7/1/12    Dr Meyers-Return 10 years   • DECOMPRES FASCIOTOMY IES PELVIC W/ DEBRIDE     • FLEXIBLE SIGMOIDOSCOPY  2/21/05    Dr Riley   • INCISION OF URETHRAL MEATUS  Remote   • PAST SURGICAL HISTORY      PSH of Rt thumb pinning   • PFT(VITAL CAP & FLOW VOL LOOP)  6/14/2000    Dr Elmore, mild to mod obstruction, partially reversable   • SHOULDER ARTHROSCOPY  7/28/10    Left rotator cuff repair   • SKIN LESION EXCISION  as teen    \"birthmark\"       Current Outpatient Prescriptions   Medication Sig   • amLODIPine (NORVASC) 10 MG tablet Take 0.5 tablets by mouth daily.   • fenofibrate (TRICOR) 54 MG tablet Take 1 tablet by mouth daily.   • losartan (COZAAR) 100 MG tablet Take 1 tablet by mouth daily.   • glipiZIDE (GLUCOTROL) 5 MG tablet Take 1 tablet by mouth 2 times daily (before meals).   • hydrALAZINE (APRESOLINE) 25 MG tablet Take 1 tablet by mouth 3 times daily.   • metFORMIN (GLUCOPHAGE) 500 MG tablet Take two tablets by mouth twice daily with meals.   • montelukast (SINGULAIR) 10 MG tablet Take 1 tablet by mouth nightly.   • fluticasone (FLONASE) 50 MCG/ACT nasal spray Spray 2 sprays in each nostril daily.   • budesonide-formoterol (SYMBICORT) 160-4.5 MCG/ACT inhaler Inhale 2  puffs into the lungs 2 times daily.   • furosemide (LASIX) 40 MG tablet Take 1 tablet by mouth daily.   • ibuprofen 200 MG capsule Take 400 mg by mouth every 6 hours as needed for Pain.   • albuterol (PROAIR HFA) 108 (90 BASE) MCG/ACT inhaler Inhale 2 puffs into the lungs every 4 hours as needed for Shortness of Breath or Wheezing.   • blood glucose test strips (ONE TOUCH ULTRA TEST) Test BS two times daily Dx E11.9   • Loratadine (CLARITIN PO) Take  by mouth.     No current facility-administered medications for this visit.        ALLERGIES:   Allergen Reactions   • Penicillins      Unknown reaction, adopted, just told not to take    • Tussionex Pennkinetic Er SWELLING     Swollen tongue   • Oxycodone Other (See Comments)     Patient states reaction only in high doses.  Get sweating, tongue changes color       Patient Active Problem List   Diagnosis   • Hyperlipidemia   • Essential hypertension, benign   • Type II or unspecified type diabetes mellitus without mention of complication, not stated as uncontrolled   • Sleep apnea   • Acute bronchitis   • Sinusitis   • Rhinitis   • Cough   • Anxiety   • Diarrhea   • COPD, moderate   • Hemoptysis   • Bronchitis, acute   • Toe injury   • Peripheral edema   • Rotator cuff strain   • Left knee pain       This office note has been dictated.     set-up required/supervision/verbal cues/nonverbal cues (demo/gestures)/1 person assist

## 2024-11-01 ENCOUNTER — TRANSCRIPTION ENCOUNTER (OUTPATIENT)
Age: 89
End: 2024-11-01

## 2024-11-01 VITALS — SYSTOLIC BLOOD PRESSURE: 114 MMHG | HEART RATE: 92 BPM | OXYGEN SATURATION: 98 % | DIASTOLIC BLOOD PRESSURE: 64 MMHG

## 2024-11-01 PROBLEM — D45 POLYCYTHEMIA VERA: Chronic | Status: ACTIVE | Noted: 2024-10-29

## 2024-11-01 LAB
GLUCOSE BLDC GLUCOMTR-MCNC: 141 MG/DL — HIGH (ref 70–99)
GLUCOSE BLDC GLUCOMTR-MCNC: 218 MG/DL — HIGH (ref 70–99)

## 2024-11-01 PROCEDURE — 99232 SBSQ HOSP IP/OBS MODERATE 35: CPT

## 2024-11-01 RX ORDER — POLYETHYLENE GLYCOL 3350 17 G/17G
17 POWDER, FOR SOLUTION ORAL ONCE
Refills: 0 | Status: COMPLETED | OUTPATIENT
Start: 2024-11-01 | End: 2024-11-01

## 2024-11-01 RX ADMIN — HYDRALAZINE HYDROCHLORIDE 50 MILLIGRAM(S): 50 TABLET, FILM COATED ORAL at 05:24

## 2024-11-01 RX ADMIN — Medication 500 MILLIGRAM(S): at 11:12

## 2024-11-01 RX ADMIN — MEMANTINE HYDROCHLORIDE 5 MILLIGRAM(S): 21 CAPSULE, EXTENDED RELEASE ORAL at 11:12

## 2024-11-01 RX ADMIN — POLYETHYLENE GLYCOL 3350 17 GRAM(S): 17 POWDER, FOR SOLUTION ORAL at 11:11

## 2024-11-01 RX ADMIN — Medication 650 MILLIGRAM(S): at 07:51

## 2024-11-01 RX ADMIN — Medication 2: at 11:11

## 2024-11-01 RX ADMIN — Medication 10 MILLIGRAM(S): at 11:12

## 2024-11-01 RX ADMIN — LOSARTAN POTASSIUM 100 MILLIGRAM(S): 25 TABLET ORAL at 05:24

## 2024-11-01 RX ADMIN — Medication 81 MILLIGRAM(S): at 11:12

## 2024-11-01 RX ADMIN — Medication 40 MILLIGRAM(S): at 05:24

## 2024-11-01 RX ADMIN — Medication 650 MILLIGRAM(S): at 08:45

## 2024-11-01 NOTE — PROGRESS NOTE ADULT - PROBLEM SELECTOR PLAN 3
- Reported palpitation yesterday  - Currently without any symptoms  - Trop 375 -> 369  - EKG = Sinus rhythm with PSC. RA enlargement. RBBB. Cannot rule out Inferior infarct  - Admit to telemetry  - F/u echo  - Cardio Dr. Unger consulted in the ED = No need for urgent intervention. Rec echo given chest pain yesterday. Please call again in the morning
- Reported palpitation yesterday  - Currently without any symptoms  - Trop 375 -> 369  - EKG = Sinus rhythm with PSC. RA enlargement. RBBB. Cannot rule out Inferior infarct  - Admit to telemetry  - F/u echo  - Cardio Dr. Unger consulted in the ED = No need for urgent intervention. Rec echo given chest pain yesterday. Please call again in the morning

## 2024-11-01 NOTE — PROGRESS NOTE ADULT - SUBJECTIVE AND OBJECTIVE BOX
Patient is a 89y old  Female who presents with a chief complaint of Elevated troponin (29 Oct 2024 21:58)    INTERVAL HPI/OVERNIGHT EVENTS: No acute events overnight. HD stable.     MEDICATIONS  (STANDING):  amLODIPine   Tablet 10 milliGRAM(s) Oral daily  aspirin  chewable 81 milliGRAM(s) Oral daily  atorvastatin 40 milliGRAM(s) Oral at bedtime  dextrose 5%. 1000 milliLiter(s) (100 mL/Hr) IV Continuous <Continuous>  dextrose 50% Injectable 25 Gram(s) IV Push once  enoxaparin Injectable 40 milliGRAM(s) SubCutaneous every 24 hours  glucagon  Injectable 1 milliGRAM(s) IntraMuscular once  hydrALAZINE 50 milliGRAM(s) Oral two times a day  hydroxyurea 500 milliGRAM(s) Oral daily  influenza  Vaccine (HIGH DOSE) 0.5 milliLiter(s) IntraMuscular once  insulin lispro (ADMELOG) corrective regimen sliding scale   SubCutaneous three times a day before meals  insulin lispro (ADMELOG) corrective regimen sliding scale   SubCutaneous at bedtime  losartan 100 milliGRAM(s) Oral daily  memantine 5 milliGRAM(s) Oral daily    MEDICATIONS  (PRN):  acetaminophen     Tablet .. 650 milliGRAM(s) Oral every 6 hours PRN Temp greater or equal to 38C (100.4F), Mild Pain (1 - 3)  aluminum hydroxide/magnesium hydroxide/simethicone Suspension 30 milliLiter(s) Oral every 4 hours PRN Dyspepsia  melatonin 3 milliGRAM(s) Oral at bedtime PRN Insomnia  morphine  - Injectable 2 milliGRAM(s) IV Push every 6 hours PRN Severe Pain (7 - 10)  ondansetron Injectable 4 milliGRAM(s) IV Push every 8 hours PRN Nausea and/or Vomiting      Allergies    atenolol (Rash)  oxycodone (Sedation/Somnol)  gabapentin (Other)  penicillin (Unknown)    Intolerances        REVIEW OF SYSTEMS: all negative with exception of above    Vital Signs Last 24 Hrs  T(C): 36.9 (30 Oct 2024 10:12), Max: 36.9 (30 Oct 2024 10:12)  T(F): 98.4 (30 Oct 2024 10:12), Max: 98.4 (30 Oct 2024 10:12)  HR: 98 (30 Oct 2024 14:02) (89 - 98)  BP: 133/62 (30 Oct 2024 14:02) (105/68 - 139/69)  BP(mean): --  RR: 22 (30 Oct 2024 10:12) (18 - 22)  SpO2: 95% (30 Oct 2024 14:02) (92% - 96%)    Parameters below as of 30 Oct 2024 14:02  Patient On (Oxygen Delivery Method): room air    PHYSICAL EXAM:  GENERAL: NAD, well-groomed  NERVOUS SYSTEM:  Alert & Oriented X3, Good concentration; Motor Strength 5/5 B/L upper and lower extremities; DTRs 2+ intact and symmetric  CHEST/LUNG: Clear to percussion bilaterally; No rales, rhonchi, wheezing, or rubs  HEART: Regular rate and rhythm; No murmurs, rubs, or gallops  ABDOMEN: Soft, Nontender, Nondistended; Bowel sounds present  EXTREMITIES:  2+ Peripheral Pulses, No clubbing, cyanosis, or edema    LABS:                        14.6   9.70  )-----------( 226      ( 30 Oct 2024 07:25 )             44.4     10-30    139  |  109[H]  |  18  ----------------------------<  125[H]  4.5   |  23  |  0.85    Ca    8.6      30 Oct 2024 07:25    TPro  7.1  /  Alb  2.1[L]  /  TBili  0.6  /  DBili  x   /  AST  58[H]  /  ALT  30  /  AlkPhos  77  10-30    PT/INR - ( 29 Oct 2024 13:50 )   PT: 11.2 sec;   INR: 0.97 ratio         PTT - ( 29 Oct 2024 13:50 )  PTT:28.1 sec  Urinalysis Basic - ( 30 Oct 2024 07:25 )    Color: x / Appearance: x / SG: x / pH: x  Gluc: 125 mg/dL / Ketone: x  / Bili: x / Urobili: x   Blood: x / Protein: x / Nitrite: x   Leuk Esterase: x / RBC: x / WBC x   Sq Epi: x / Non Sq Epi: x / Bacteria: x      CAPILLARY BLOOD GLUCOSE      POCT Blood Glucose.: 168 mg/dL (30 Oct 2024 11:17)  POCT Blood Glucose.: 124 mg/dL (30 Oct 2024 07:59)  POCT Blood Glucose.: 127 mg/dL (29 Oct 2024 23:31)      RADIOLOGY & ADDITIONAL TESTS:    Imaging Personally Reviewed:  [ ] YES  [ ] NO    Consultant(s) Notes Reviewed:  [ ] YES  [ ] NO    Care Discussed with Consultants/Other Providers [ ] YES  [ ] NO
Patient is a 89y old  Female who presents with a chief complaint of Elevated troponin (31 Oct 2024 16:14)    INTERVAL HPI/OVERNIGHT EVENTS: No acute events overnight. HD stable.     MEDICATIONS  (STANDING):  amLODIPine   Tablet 10 milliGRAM(s) Oral daily  aspirin  chewable 81 milliGRAM(s) Oral daily  atorvastatin 40 milliGRAM(s) Oral at bedtime  dextrose 5%. 1000 milliLiter(s) (100 mL/Hr) IV Continuous <Continuous>  dextrose 50% Injectable 25 Gram(s) IV Push once  enoxaparin Injectable 40 milliGRAM(s) SubCutaneous every 24 hours  glucagon  Injectable 1 milliGRAM(s) IntraMuscular once  hydrALAZINE 50 milliGRAM(s) Oral two times a day  hydroxyurea 500 milliGRAM(s) Oral daily  influenza  Vaccine (HIGH DOSE) 0.5 milliLiter(s) IntraMuscular once  insulin lispro (ADMELOG) corrective regimen sliding scale   SubCutaneous at bedtime  insulin lispro (ADMELOG) corrective regimen sliding scale   SubCutaneous three times a day before meals  losartan 100 milliGRAM(s) Oral daily  memantine 5 milliGRAM(s) Oral daily    MEDICATIONS  (PRN):  acetaminophen     Tablet .. 650 milliGRAM(s) Oral every 6 hours PRN Temp greater or equal to 38C (100.4F), Mild Pain (1 - 3)  aluminum hydroxide/magnesium hydroxide/simethicone Suspension 30 milliLiter(s) Oral every 4 hours PRN Dyspepsia  melatonin 3 milliGRAM(s) Oral at bedtime PRN Insomnia  morphine  - Injectable 2 milliGRAM(s) IV Push every 6 hours PRN Severe Pain (7 - 10)  ondansetron Injectable 4 milliGRAM(s) IV Push every 8 hours PRN Nausea and/or Vomiting      Allergies    atenolol (Rash)  oxycodone (Sedation/Somnol)  gabapentin (Other)  penicillin (Unknown)    Intolerances        REVIEW OF SYSTEMS: all negative with exception of above    Vital Signs Last 24 Hrs  T(C): 36.7 (01 Nov 2024 10:37), Max: 37 (01 Nov 2024 05:14)  T(F): 98 (01 Nov 2024 10:37), Max: 98.6 (01 Nov 2024 05:14)  HR: 94 (01 Nov 2024 10:37) (84 - 94)  BP: 110/69 (01 Nov 2024 10:37) (110/69 - 123/77)  BP(mean): --  RR: 18 (01 Nov 2024 10:37) (17 - 18)  SpO2: 97% (01 Nov 2024 10:37) (93% - 97%)    Parameters below as of 01 Nov 2024 10:37  Patient On (Oxygen Delivery Method): room air    PHYSICAL EXAM:  GENERAL: NAD, well-groomed  NERVOUS SYSTEM:  Alert & Oriented X3, Good concentration; Motor Strength 5/5 B/L upper and lower extremities; DTRs 2+ intact and symmetric  CHEST/LUNG: Clear to percussion bilaterally; No rales, rhonchi, wheezing, or rubs  HEART: Regular rate and rhythm; No murmurs, rubs, or gallops  ABDOMEN: Soft, Nontender, Nondistended; Bowel sounds present  EXTREMITIES:  2+ Peripheral Pulses, No clubbing, cyanosis, or edema    LABS:              CAPILLARY BLOOD GLUCOSE      POCT Blood Glucose.: 218 mg/dL (01 Nov 2024 10:57)  POCT Blood Glucose.: 141 mg/dL (01 Nov 2024 07:32)  POCT Blood Glucose.: 128 mg/dL (31 Oct 2024 21:03)  POCT Blood Glucose.: 116 mg/dL (31 Oct 2024 16:49)      RADIOLOGY & ADDITIONAL TESTS:    Imaging Personally Reviewed:  [ ] YES  [ ] NO    Consultant(s) Notes Reviewed:  [ ] YES  [ ] NO    Care Discussed with Consultants/Other Providers [ ] YES  [ ] NO

## 2024-11-01 NOTE — PROGRESS NOTE ADULT - ASSESSMENT
Patient is a 89F, with PMHx of Polycythemia vera(JAK2), HTN, HLD, DM, Dementia, Hypothyroidism, who comes in due to a mechanical fall resulting in right shoulder pain. Found to have anterior dislocation of R shoulder. Admitted for elevated troponin.
Patient is a 89F, with PMHx of Polycythemia vera(JAK2), HTN, HLD, DM, Dementia, Hypothyroidism, who comes in due to a mechanical fall resulting in right shoulder pain. Found to have anterior dislocation of R shoulder. Admitted for elevated troponin.

## 2024-11-01 NOTE — PROGRESS NOTE ADULT - PROBLEM SELECTOR PLAN 2
- Denies head stroke or LOC  - US duplex negative for DVT  - F/u PT
- Denies head stroke or LOC  - US duplex negative for DVT  - F/u PT

## 2024-11-01 NOTE — PROGRESS NOTE ADULT - PROBLEM SELECTOR PLAN 5
- Recently diagnosed ERIC-2 positive polycythemia vera  - P/w Hgb 15.6  - C/w home Hydroxyurea 500mg daily, ASA
- Recently diagnosed ERIC-2 positive polycythemia vera  - P/w Hgb 15.6  - C/w home Hydroxyurea 500mg daily, ASA

## 2024-11-01 NOTE — DISCHARGE NOTE NURSING/CASE MANAGEMENT/SOCIAL WORK - PATIENT PORTAL LINK FT
You can access the FollowMyHealth Patient Portal offered by [facility] by registering at the following website: [email or web address]. By joining [facility]’s FollowMyHealth portal, you will also be able to view your health information using other applications (apps) compatible with our system.

## 2024-11-01 NOTE — PROGRESS NOTE ADULT - PROBLEM SELECTOR PLAN 4
- Patient p/w RLE swelling  - Daughter reports chronic but does not know specifically  - F/u US duplex LEs to r/o DVT
- Patient p/w RLE swelling  - Daughter reports chronic but does not know specifically  - F/u US duplex LEs to r/o DVT

## 2024-11-01 NOTE — DISCHARGE NOTE NURSING/CASE MANAGEMENT/SOCIAL WORK - FINANCIAL ASSISTANCE
[facility] provides services at a reduced cost to those who are determined to be eligible through [facility]’s financial assistance program. Information regarding [facility]’s financial assistance program can be found by going to [email or web address] or by calling [phone].

## 2024-11-01 NOTE — DISCHARGE NOTE NURSING/CASE MANAGEMENT/SOCIAL WORK - NSDCFUADDAPPT_GEN_ALL_CORE_FT
APPTS ARE READY TO BE MADE: [X] YES    Best Family or Patient Contact (if needed):    Additional Information about above appointments (if needed):    1: Call to make appointment for Orthopedic Dr Velia Shearer in 1 week

## 2024-11-01 NOTE — PROGRESS NOTE ADULT - PROBLEM SELECTOR PLAN 1
- P/w R shoulder pain  - CXR, shoulder = Anterior dislocation of R glenohumeral joint  - S/p Dilaudid, Morphine, and Ativan in the ED due to pain and restlessness  - Pain control  - NWB R shoulder  - Ortho consulted = s/p closed reduction of R shoulder. Follow up outpatient with Dr. Velia Shearer within 1 week, please call office for appointment.  - F/u TTE
- P/w R shoulder pain  - CXR, shoulder = Anterior dislocation of R glenohumeral joint  - S/p Dilaudid, Morphine, and Ativan in the ED due to pain and restlessness  - Pain control  - NWB R shoulder  - Ortho consulted = s/p closed reduction of R shoulder. Follow up outpatient with Dr. Velia Shearer within 1 week, please call office for appointment.  - F/u TTE

## 2024-11-01 NOTE — PROGRESS NOTE ADULT - PROBLEM SELECTOR PLAN 7
- AAOx2 at baseline. Currently at baseline  - C/w home memantine 5mg BID
- AAOx2 at baseline. Currently at baseline  - C/w home memantine 5mg BID

## 2024-11-01 NOTE — PROGRESS NOTE ADULT - PROBLEM SELECTOR PLAN 8
- Home meds Januvia 100, Repaglinide 0.5mg BID, Jardiance 10mg  - Hold home meds  - ISS + FS achs
- Home meds Januvia 100, Repaglinide 0.5mg BID, Jardiance 10mg  - Hold home meds  - ISS + FS achs

## 2024-11-01 NOTE — PROGRESS NOTE ADULT - PROBLEM SELECTOR PLAN 6
- C/w home amlodipine 10mg, hydralazine 50mg BID, losartan 100mg
- C/w home amlodipine 10mg, hydralazine 50mg BID, losartan 100mg

## 2024-11-05 PROBLEM — Z00.00 ENCOUNTER FOR PREVENTIVE HEALTH EXAMINATION: Status: ACTIVE | Noted: 2024-11-05

## 2024-11-08 ENCOUNTER — APPOINTMENT (OUTPATIENT)
Dept: ORTHOPEDIC SURGERY | Facility: CLINIC | Age: 89
End: 2024-11-08

## 2024-11-08 VITALS — HEIGHT: 61 IN | BODY MASS INDEX: 23.6 KG/M2 | WEIGHT: 125 LBS

## 2024-11-08 DIAGNOSIS — R10.13 EPIGASTRIC PAIN: ICD-10-CM

## 2024-11-08 DIAGNOSIS — S43.014A ANTERIOR DISLOCATION OF RIGHT HUMERUS, INITIAL ENCOUNTER: ICD-10-CM

## 2024-11-08 DIAGNOSIS — M25.311 OTHER INSTABILITY, RIGHT SHOULDER: ICD-10-CM

## 2024-11-08 DIAGNOSIS — E11.9 TYPE 2 DIABETES MELLITUS WITHOUT COMPLICATIONS: ICD-10-CM

## 2024-11-08 DIAGNOSIS — E78.5 HYPERLIPIDEMIA, UNSPECIFIED: ICD-10-CM

## 2024-11-08 DIAGNOSIS — M12.811 OTHER SPECIFIC ARTHROPATHIES, NOT ELSEWHERE CLASSIFIED, RIGHT SHOULDER: ICD-10-CM

## 2024-11-08 DIAGNOSIS — Z79.84 LONG TERM (CURRENT) USE OF ORAL HYPOGLYCEMIC DRUGS: ICD-10-CM

## 2024-11-08 DIAGNOSIS — I10 ESSENTIAL (PRIMARY) HYPERTENSION: ICD-10-CM

## 2024-11-08 DIAGNOSIS — R79.89 OTHER SPECIFIED ABNORMAL FINDINGS OF BLOOD CHEMISTRY: ICD-10-CM

## 2024-11-08 DIAGNOSIS — E11.9 TYPE 2 DIABETES MELLITUS W/OUT COMPLICATIONS: ICD-10-CM

## 2024-11-08 DIAGNOSIS — K21.9 GASTRO-ESOPHAGEAL REFLUX DISEASE WITHOUT ESOPHAGITIS: ICD-10-CM

## 2024-11-08 DIAGNOSIS — Z88.0 ALLERGY STATUS TO PENICILLIN: ICD-10-CM

## 2024-11-08 DIAGNOSIS — E03.9 HYPOTHYROIDISM, UNSPECIFIED: ICD-10-CM
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
## 2024-11-08 DIAGNOSIS — D45 POLYCYTHEMIA VERA: ICD-10-CM

## 2024-11-08 DIAGNOSIS — S43.006A UNSPECIFIED DISLOCATION OF UNSPECIFIED SHOULDER JOINT, INITIAL ENCOUNTER: ICD-10-CM

## 2024-11-08 DIAGNOSIS — S46.011A STRAIN OF MUSCLE(S) AND TENDON(S) OF THE ROTATOR CUFF OF RIGHT SHOULDER, INITIAL ENCOUNTER: ICD-10-CM

## 2024-11-08 DIAGNOSIS — F03.90 UNSPECIFIED DEMENTIA, UNSPECIFIED SEVERITY, WITHOUT BEHAVIORAL DISTURBANCE, PSYCHOTIC DISTURBANCE, MOOD DISTURBANCE, AND ANXIETY: ICD-10-CM

## 2024-11-08 PROCEDURE — 99204 OFFICE O/P NEW MOD 45 MIN: CPT

## 2024-11-08 RX ORDER — EMPAGLIFLOZIN 25 MG/1
TABLET, FILM COATED ORAL
Refills: 0 | Status: ACTIVE | COMMUNITY

## 2024-11-08 RX ORDER — AMLODIPINE BESYLATE 5 MG/1
TABLET ORAL
Refills: 0 | Status: ACTIVE | COMMUNITY

## 2024-11-08 RX ORDER — REPAGLINIDE 1 MG/1
TABLET ORAL
Refills: 0 | Status: ACTIVE | COMMUNITY

## 2024-11-08 RX ORDER — SITAGLIPTIN 100 MG/1
TABLET, FILM COATED ORAL
Refills: 0 | Status: ACTIVE | COMMUNITY

## 2024-11-08 RX ORDER — NAPROXEN 500 MG/1
500 TABLET ORAL
Qty: 30 | Refills: 1 | Status: ACTIVE | COMMUNITY
Start: 2024-11-08 | End: 1900-01-01

## 2024-11-08 RX ORDER — LOSARTAN POTASSIUM 100 MG/1
TABLET, FILM COATED ORAL
Refills: 0 | Status: ACTIVE | COMMUNITY

## 2024-11-08 RX ORDER — MEMANTINE HYDROCHLORIDE 5 MG-10 MG
KIT ORAL
Refills: 0 | Status: ACTIVE | COMMUNITY

## 2024-12-06 ENCOUNTER — APPOINTMENT (OUTPATIENT)
Dept: ORTHOPEDIC SURGERY | Facility: CLINIC | Age: 89
End: 2024-12-06

## 2024-12-06 DIAGNOSIS — M12.811 OTHER SPECIFIC ARTHROPATHIES, NOT ELSEWHERE CLASSIFIED, RIGHT SHOULDER: ICD-10-CM

## 2024-12-06 DIAGNOSIS — M25.311 OTHER INSTABILITY, RIGHT SHOULDER: ICD-10-CM

## 2024-12-06 DIAGNOSIS — S46.011A STRAIN OF MUSCLE(S) AND TENDON(S) OF THE ROTATOR CUFF OF RIGHT SHOULDER, INITIAL ENCOUNTER: ICD-10-CM

## 2024-12-06 PROCEDURE — 99214 OFFICE O/P EST MOD 30 MIN: CPT

## 2024-12-18 ENCOUNTER — APPOINTMENT (OUTPATIENT)
Dept: NEUROLOGY | Facility: CLINIC | Age: 89
End: 2024-12-18
Payer: MEDICARE

## 2024-12-18 PROCEDURE — 95910 NRV CNDJ TEST 7-8 STUDIES: CPT

## 2024-12-18 PROCEDURE — 95886 MUSC TEST DONE W/N TEST COMP: CPT

## 2024-12-18 PROCEDURE — 95885 MUSC TST DONE W/NERV TST LIM: CPT | Mod: 59,LT

## 2024-12-27 ENCOUNTER — APPOINTMENT (OUTPATIENT)
Dept: ORTHOPEDIC SURGERY | Facility: CLINIC | Age: 89
End: 2024-12-27

## 2025-01-10 ENCOUNTER — APPOINTMENT (OUTPATIENT)
Dept: ORTHOPEDIC SURGERY | Facility: CLINIC | Age: 89
End: 2025-01-10

## 2025-01-10 VITALS — BODY MASS INDEX: 23.6 KG/M2 | WEIGHT: 125 LBS | HEIGHT: 61 IN

## 2025-01-10 DIAGNOSIS — G54.0 BRACHIAL PLEXUS DISORDERS: ICD-10-CM

## 2025-01-10 DIAGNOSIS — S43.006A UNSPECIFIED DISLOCATION OF UNSPECIFIED SHOULDER JOINT, INITIAL ENCOUNTER: ICD-10-CM

## 2025-01-10 DIAGNOSIS — M25.311 OTHER INSTABILITY, RIGHT SHOULDER: ICD-10-CM

## 2025-01-10 DIAGNOSIS — M12.811 OTHER SPECIFIC ARTHROPATHIES, NOT ELSEWHERE CLASSIFIED, RIGHT SHOULDER: ICD-10-CM

## 2025-01-10 PROCEDURE — 99214 OFFICE O/P EST MOD 30 MIN: CPT

## 2025-02-07 ENCOUNTER — INPATIENT (INPATIENT)
Facility: HOSPITAL | Age: 89
LOS: 1 days | Discharge: HOME | End: 2025-02-09
Attending: INTERNAL MEDICINE | Admitting: INTERNAL MEDICINE
Payer: MEDICARE

## 2025-02-07 ENCOUNTER — RESULT REVIEW (OUTPATIENT)
Age: 89
End: 2025-02-07

## 2025-02-07 VITALS
HEART RATE: 85 BPM | HEIGHT: 67 IN | RESPIRATION RATE: 18 BRPM | OXYGEN SATURATION: 98 % | TEMPERATURE: 98 F | SYSTOLIC BLOOD PRESSURE: 118 MMHG | DIASTOLIC BLOOD PRESSURE: 72 MMHG | WEIGHT: 160.06 LBS

## 2025-02-07 DIAGNOSIS — J34.9 UNSPECIFIED DISORDER OF NOSE AND NASAL SINUSES: Chronic | ICD-10-CM

## 2025-02-07 DIAGNOSIS — R07.9 CHEST PAIN, UNSPECIFIED: ICD-10-CM

## 2025-02-07 LAB
ALBUMIN SERPL ELPH-MCNC: 3 G/DL — LOW (ref 3.3–5)
ALP SERPL-CCNC: 87 U/L — SIGNIFICANT CHANGE UP (ref 40–120)
ALT FLD-CCNC: 36 U/L — HIGH (ref 4–33)
ANION GAP SERPL CALC-SCNC: 8 MMOL/L — SIGNIFICANT CHANGE UP (ref 7–14)
AST SERPL-CCNC: 33 U/L — HIGH (ref 4–32)
BASOPHILS # BLD AUTO: 0.02 K/UL — SIGNIFICANT CHANGE UP (ref 0–0.2)
BASOPHILS NFR BLD AUTO: 0.3 % — SIGNIFICANT CHANGE UP (ref 0–2)
BILIRUB SERPL-MCNC: <0.2 MG/DL — SIGNIFICANT CHANGE UP (ref 0.2–1.2)
BUN SERPL-MCNC: 19 MG/DL — SIGNIFICANT CHANGE UP (ref 7–23)
CALCIUM SERPL-MCNC: 8.7 MG/DL — SIGNIFICANT CHANGE UP (ref 8.4–10.5)
CHLORIDE SERPL-SCNC: 107 MMOL/L — SIGNIFICANT CHANGE UP (ref 98–107)
CK MB BLD-MCNC: 2.7 % — HIGH (ref 0–2.5)
CK MB BLD-MCNC: 2.9 % — HIGH (ref 0–2.5)
CK MB CFR SERPL CALC: 6.2 NG/ML — HIGH
CK MB CFR SERPL CALC: 6.9 NG/ML — HIGH
CK SERPL-CCNC: 231 U/L — HIGH (ref 25–170)
CK SERPL-CCNC: 238 U/L — HIGH (ref 25–170)
CO2 SERPL-SCNC: 26 MMOL/L — SIGNIFICANT CHANGE UP (ref 22–31)
CREAT SERPL-MCNC: 0.93 MG/DL — SIGNIFICANT CHANGE UP (ref 0.5–1.3)
EGFR: 59 ML/MIN/1.73M2 — LOW
EGFR: 59 ML/MIN/1.73M2 — LOW
EOSINOPHIL # BLD AUTO: 0.06 K/UL — SIGNIFICANT CHANGE UP (ref 0–0.5)
EOSINOPHIL NFR BLD AUTO: 0.8 % — SIGNIFICANT CHANGE UP (ref 0–6)
GLUCOSE BLDC GLUCOMTR-MCNC: 115 MG/DL — HIGH (ref 70–99)
GLUCOSE BLDC GLUCOMTR-MCNC: 152 MG/DL — HIGH (ref 70–99)
GLUCOSE BLDC GLUCOMTR-MCNC: 185 MG/DL — HIGH (ref 70–99)
GLUCOSE BLDC GLUCOMTR-MCNC: 94 MG/DL — SIGNIFICANT CHANGE UP (ref 70–99)
GLUCOSE SERPL-MCNC: 129 MG/DL — HIGH (ref 70–99)
HCT VFR BLD CALC: 48.5 % — HIGH (ref 34.5–45)
HGB BLD-MCNC: 15.5 G/DL — SIGNIFICANT CHANGE UP (ref 11.5–15.5)
IANC: 4.23 K/UL — SIGNIFICANT CHANGE UP (ref 1.8–7.4)
IMM GRANULOCYTES NFR BLD AUTO: 0.3 % — SIGNIFICANT CHANGE UP (ref 0–0.9)
LIDOCAIN IGE QN: 37 U/L — SIGNIFICANT CHANGE UP (ref 7–60)
LYMPHOCYTES # BLD AUTO: 2.75 K/UL — SIGNIFICANT CHANGE UP (ref 1–3.3)
LYMPHOCYTES # BLD AUTO: 36.6 % — SIGNIFICANT CHANGE UP (ref 13–44)
MCHC RBC-ENTMCNC: 31.4 PG — SIGNIFICANT CHANGE UP (ref 27–34)
MCHC RBC-ENTMCNC: 32 G/DL — SIGNIFICANT CHANGE UP (ref 32–36)
MCV RBC AUTO: 98.2 FL — SIGNIFICANT CHANGE UP (ref 80–100)
MONOCYTES # BLD AUTO: 0.44 K/UL — SIGNIFICANT CHANGE UP (ref 0–0.9)
MONOCYTES NFR BLD AUTO: 5.9 % — SIGNIFICANT CHANGE UP (ref 2–14)
NEUTROPHILS # BLD AUTO: 4.23 K/UL — SIGNIFICANT CHANGE UP (ref 1.8–7.4)
NEUTROPHILS NFR BLD AUTO: 56.1 % — SIGNIFICANT CHANGE UP (ref 43–77)
NRBC # BLD AUTO: 0 K/UL — SIGNIFICANT CHANGE UP (ref 0–0)
NRBC # BLD: 0 /100 WBCS — SIGNIFICANT CHANGE UP (ref 0–0)
NRBC # FLD: 0 K/UL — SIGNIFICANT CHANGE UP (ref 0–0)
NRBC BLD-RTO: 0 /100 WBCS — SIGNIFICANT CHANGE UP (ref 0–0)
NT-PROBNP SERPL-SCNC: 2180 PG/ML — HIGH
PLATELET # BLD AUTO: 271 K/UL — SIGNIFICANT CHANGE UP (ref 150–400)
POTASSIUM SERPL-MCNC: 3.7 MMOL/L — SIGNIFICANT CHANGE UP (ref 3.5–5.3)
POTASSIUM SERPL-SCNC: 3.7 MMOL/L — SIGNIFICANT CHANGE UP (ref 3.5–5.3)
PROT SERPL-MCNC: 7.1 G/DL — SIGNIFICANT CHANGE UP (ref 6–8.3)
RBC # BLD: 4.94 M/UL — SIGNIFICANT CHANGE UP (ref 3.8–5.2)
RBC # FLD: 13.6 % — SIGNIFICANT CHANGE UP (ref 10.3–14.5)
SODIUM SERPL-SCNC: 141 MMOL/L — SIGNIFICANT CHANGE UP (ref 135–145)
TROPONIN T, HIGH SENSITIVITY RESULT: 123 NG/L — CRITICAL HIGH
TROPONIN T, HIGH SENSITIVITY RESULT: 133 NG/L — CRITICAL HIGH
TROPONIN T, HIGH SENSITIVITY RESULT: 137 NG/L — CRITICAL HIGH
WBC # BLD: 7.52 K/UL — SIGNIFICANT CHANGE UP (ref 3.8–10.5)
WBC # FLD AUTO: 7.52 K/UL — SIGNIFICANT CHANGE UP (ref 3.8–10.5)

## 2025-02-07 PROCEDURE — 93306 TTE W/DOPPLER COMPLETE: CPT | Mod: 26

## 2025-02-07 PROCEDURE — 99285 EMERGENCY DEPT VISIT HI MDM: CPT

## 2025-02-07 PROCEDURE — 71045 X-RAY EXAM CHEST 1 VIEW: CPT | Mod: 26

## 2025-02-07 PROCEDURE — 74177 CT ABD & PELVIS W/CONTRAST: CPT | Mod: 26

## 2025-02-07 RX ORDER — INSULIN LISPRO 100 U/ML
INJECTION, SOLUTION INTRAVENOUS; SUBCUTANEOUS AT BEDTIME
Refills: 0 | Status: DISCONTINUED | OUTPATIENT
Start: 2025-02-07 | End: 2025-02-09

## 2025-02-07 RX ORDER — SODIUM CHLORIDE 9 G/1000ML
1000 INJECTION, SOLUTION INTRAVENOUS
Refills: 0 | Status: DISCONTINUED | OUTPATIENT
Start: 2025-02-07 | End: 2025-02-07

## 2025-02-07 RX ORDER — GLUCAGON 3 MG/1
1 POWDER NASAL ONCE
Refills: 0 | Status: DISCONTINUED | OUTPATIENT
Start: 2025-02-07 | End: 2025-02-09

## 2025-02-07 RX ORDER — ENOXAPARIN SODIUM 100 MG/ML
40 INJECTION SUBCUTANEOUS EVERY 24 HOURS
Refills: 0 | Status: DISCONTINUED | OUTPATIENT
Start: 2025-02-07 | End: 2025-02-09

## 2025-02-07 RX ORDER — INSULIN GLARGINE-YFGN 100 [IU]/ML
5 INJECTION, SOLUTION SUBCUTANEOUS AT BEDTIME
Refills: 0 | Status: DISCONTINUED | OUTPATIENT
Start: 2025-02-07 | End: 2025-02-09

## 2025-02-07 RX ORDER — INSULIN LISPRO 100 U/ML
INJECTION, SOLUTION INTRAVENOUS; SUBCUTANEOUS
Refills: 0 | Status: DISCONTINUED | OUTPATIENT
Start: 2025-02-07 | End: 2025-02-09

## 2025-02-07 RX ORDER — DEXTROSE 50 % IN WATER 50 %
15 SYRINGE (ML) INTRAVENOUS ONCE
Refills: 0 | Status: DISCONTINUED | OUTPATIENT
Start: 2025-02-07 | End: 2025-02-09

## 2025-02-07 RX ORDER — DEXTROSE 50 % IN WATER 50 %
25 SYRINGE (ML) INTRAVENOUS ONCE
Refills: 0 | Status: DISCONTINUED | OUTPATIENT
Start: 2025-02-07 | End: 2025-02-09

## 2025-02-07 RX ORDER — POLYETHYLENE GLYCOL 3350 17 G/17G
17 POWDER, FOR SOLUTION ORAL DAILY
Refills: 0 | Status: DISCONTINUED | OUTPATIENT
Start: 2025-02-07 | End: 2025-02-09

## 2025-02-07 RX ORDER — MAGNESIUM, ALUMINUM HYDROXIDE 200-200 MG
30 TABLET,CHEWABLE ORAL ONCE
Refills: 0 | Status: COMPLETED | OUTPATIENT
Start: 2025-02-07 | End: 2025-02-07

## 2025-02-07 RX ORDER — BISACODYL 5 MG
5 TABLET, DELAYED RELEASE (ENTERIC COATED) ORAL AT BEDTIME
Refills: 0 | Status: DISCONTINUED | OUTPATIENT
Start: 2025-02-07 | End: 2025-02-09

## 2025-02-07 RX ORDER — POLYETHYLENE GLYCOL 3350 17 G/17G
17 POWDER, FOR SOLUTION ORAL
Refills: 0 | DISCHARGE

## 2025-02-07 RX ORDER — DEXTROSE 50 % IN WATER 50 %
12.5 SYRINGE (ML) INTRAVENOUS ONCE
Refills: 0 | Status: DISCONTINUED | OUTPATIENT
Start: 2025-02-07 | End: 2025-02-09

## 2025-02-07 RX ORDER — SENNA 187 MG
1 TABLET ORAL
Refills: 0 | DISCHARGE

## 2025-02-07 RX ORDER — INFLUENZA A VIRUS A/IDAHO/07/2018 (H1N1) ANTIGEN (MDCK CELL DERIVED, PROPIOLACTONE INACTIVATED, INFLUENZA A VIRUS A/INDIANA/08/2018 (H3N2) ANTIGEN (MDCK CELL DERIVED, PROPIOLACTONE INACTIVATED), INFLUENZA B VIRUS B/SINGAPORE/INFTT-16-0610/2016 ANTIGEN (MDCK CELL DERIVED, PROPIOLACTONE INACTIVATED), INFLUENZA B VIRUS B/IOWA/06/2017 ANTIGEN (MDCK CELL DERIVED, PROPIOLACTONE INACTIVATED) 15; 15; 15; 15 UG/.5ML; UG/.5ML; UG/.5ML; UG/.5ML
0.5 INJECTION, SUSPENSION INTRAMUSCULAR ONCE
Refills: 0 | Status: DISCONTINUED | OUTPATIENT
Start: 2025-02-07 | End: 2025-02-09

## 2025-02-07 RX ORDER — DOCUSATE SODIUM 100 MG
1 CAPSULE ORAL
Refills: 0 | DISCHARGE

## 2025-02-07 RX ORDER — SENNA 187 MG
2 TABLET ORAL AT BEDTIME
Refills: 0 | Status: DISCONTINUED | OUTPATIENT
Start: 2025-02-07 | End: 2025-02-09

## 2025-02-07 RX ORDER — AMLODIPINE BESYLATE 10 MG/1
10 TABLET ORAL DAILY
Refills: 0 | Status: DISCONTINUED | OUTPATIENT
Start: 2025-02-07 | End: 2025-02-09

## 2025-02-07 RX ORDER — ATORVASTATIN CALCIUM 80 MG/1
40 TABLET, FILM COATED ORAL AT BEDTIME
Refills: 0 | Status: DISCONTINUED | OUTPATIENT
Start: 2025-02-07 | End: 2025-02-09

## 2025-02-07 RX ORDER — ASPIRIN 325 MG
81 TABLET ORAL DAILY
Refills: 0 | Status: DISCONTINUED | OUTPATIENT
Start: 2025-02-07 | End: 2025-02-09

## 2025-02-07 RX ORDER — MEMANTINE HYDROCHLORIDE 21 MG/1
5 CAPSULE, EXTENDED RELEASE ORAL DAILY
Refills: 0 | Status: DISCONTINUED | OUTPATIENT
Start: 2025-02-07 | End: 2025-02-09

## 2025-02-07 RX ORDER — LOSARTAN POTASSIUM 100 MG/1
100 TABLET, FILM COATED ORAL DAILY
Refills: 0 | Status: DISCONTINUED | OUTPATIENT
Start: 2025-02-07 | End: 2025-02-09

## 2025-02-07 RX ORDER — SODIUM CHLORIDE 9 G/1000ML
1000 INJECTION, SOLUTION INTRAVENOUS
Refills: 0 | Status: DISCONTINUED | OUTPATIENT
Start: 2025-02-07 | End: 2025-02-09

## 2025-02-07 RX ORDER — LACTULOSE 10 G/15ML
20 SOLUTION ORAL ONCE
Refills: 0 | Status: COMPLETED | OUTPATIENT
Start: 2025-02-07 | End: 2025-02-07

## 2025-02-07 RX ADMIN — Medication 81 MILLIGRAM(S): at 16:04

## 2025-02-07 RX ADMIN — ATORVASTATIN CALCIUM 40 MILLIGRAM(S): 80 TABLET, FILM COATED ORAL at 21:29

## 2025-02-07 RX ADMIN — INSULIN GLARGINE-YFGN 5 UNIT(S): 100 INJECTION, SOLUTION SUBCUTANEOUS at 21:29

## 2025-02-07 RX ADMIN — Medication 2 TABLET(S): at 21:28

## 2025-02-07 RX ADMIN — MEMANTINE HYDROCHLORIDE 5 MILLIGRAM(S): 21 CAPSULE, EXTENDED RELEASE ORAL at 16:04

## 2025-02-07 RX ADMIN — LACTULOSE 20 GRAM(S): 10 SOLUTION ORAL at 16:04

## 2025-02-07 RX ADMIN — Medication 30 MILLILITER(S): at 09:29

## 2025-02-07 RX ADMIN — LOSARTAN POTASSIUM 100 MILLIGRAM(S): 100 TABLET, FILM COATED ORAL at 18:16

## 2025-02-07 RX ADMIN — INSULIN LISPRO 2: 100 INJECTION, SOLUTION INTRAVENOUS; SUBCUTANEOUS at 16:09

## 2025-02-07 RX ADMIN — Medication 50 MILLIGRAM(S): at 18:15

## 2025-02-07 RX ADMIN — Medication 20 MILLIGRAM(S): at 09:29

## 2025-02-07 RX ADMIN — AMLODIPINE BESYLATE 10 MILLIGRAM(S): 10 TABLET ORAL at 16:04

## 2025-02-07 RX ADMIN — Medication 5 MILLIGRAM(S): at 21:28

## 2025-02-07 RX ADMIN — ENOXAPARIN SODIUM 40 MILLIGRAM(S): 100 INJECTION SUBCUTANEOUS at 21:29

## 2025-02-08 DIAGNOSIS — R10.13 EPIGASTRIC PAIN: ICD-10-CM

## 2025-02-08 DIAGNOSIS — K59.00 CONSTIPATION, UNSPECIFIED: ICD-10-CM

## 2025-02-08 DIAGNOSIS — E11.9 TYPE 2 DIABETES MELLITUS WITHOUT COMPLICATIONS: ICD-10-CM

## 2025-02-08 DIAGNOSIS — I10 ESSENTIAL (PRIMARY) HYPERTENSION: ICD-10-CM

## 2025-02-08 DIAGNOSIS — E78.5 HYPERLIPIDEMIA, UNSPECIFIED: ICD-10-CM

## 2025-02-08 LAB
A1C WITH ESTIMATED AVERAGE GLUCOSE RESULT: 6.6 % — HIGH (ref 4–5.6)
ANION GAP SERPL CALC-SCNC: 14 MMOL/L — SIGNIFICANT CHANGE UP (ref 7–14)
BUN SERPL-MCNC: 16 MG/DL — SIGNIFICANT CHANGE UP (ref 7–23)
CALCIUM SERPL-MCNC: 8.7 MG/DL — SIGNIFICANT CHANGE UP (ref 8.4–10.5)
CHLORIDE SERPL-SCNC: 104 MMOL/L — SIGNIFICANT CHANGE UP (ref 98–107)
CO2 SERPL-SCNC: 22 MMOL/L — SIGNIFICANT CHANGE UP (ref 22–31)
CREAT SERPL-MCNC: 0.9 MG/DL — SIGNIFICANT CHANGE UP (ref 0.5–1.3)
EGFR: 61 ML/MIN/1.73M2 — SIGNIFICANT CHANGE UP
EGFR: 61 ML/MIN/1.73M2 — SIGNIFICANT CHANGE UP
ESTIMATED AVERAGE GLUCOSE: 143 — SIGNIFICANT CHANGE UP
GLUCOSE BLDC GLUCOMTR-MCNC: 101 MG/DL — HIGH (ref 70–99)
GLUCOSE BLDC GLUCOMTR-MCNC: 113 MG/DL — HIGH (ref 70–99)
GLUCOSE BLDC GLUCOMTR-MCNC: 125 MG/DL — HIGH (ref 70–99)
GLUCOSE BLDC GLUCOMTR-MCNC: 152 MG/DL — HIGH (ref 70–99)
GLUCOSE BLDC GLUCOMTR-MCNC: 181 MG/DL — HIGH (ref 70–99)
GLUCOSE BLDC GLUCOMTR-MCNC: 96 MG/DL — SIGNIFICANT CHANGE UP (ref 70–99)
GLUCOSE SERPL-MCNC: 78 MG/DL — SIGNIFICANT CHANGE UP (ref 70–99)
HCT VFR BLD CALC: 48.9 % — HIGH (ref 34.5–45)
HGB BLD-MCNC: 16.1 G/DL — HIGH (ref 11.5–15.5)
MAGNESIUM SERPL-MCNC: 2.4 MG/DL — SIGNIFICANT CHANGE UP (ref 1.6–2.6)
MCHC RBC-ENTMCNC: 31.9 PG — SIGNIFICANT CHANGE UP (ref 27–34)
MCHC RBC-ENTMCNC: 32.9 G/DL — SIGNIFICANT CHANGE UP (ref 32–36)
MCV RBC AUTO: 96.8 FL — SIGNIFICANT CHANGE UP (ref 80–100)
NRBC # BLD AUTO: 0 K/UL — SIGNIFICANT CHANGE UP (ref 0–0)
NRBC # BLD: 0 /100 WBCS — SIGNIFICANT CHANGE UP (ref 0–0)
NRBC # FLD: 0 K/UL — SIGNIFICANT CHANGE UP (ref 0–0)
NRBC BLD-RTO: 0 /100 WBCS — SIGNIFICANT CHANGE UP (ref 0–0)
PHOSPHATE SERPL-MCNC: 3.4 MG/DL — SIGNIFICANT CHANGE UP (ref 2.5–4.5)
PLATELET # BLD AUTO: 271 K/UL — SIGNIFICANT CHANGE UP (ref 150–400)
POTASSIUM SERPL-MCNC: 3.8 MMOL/L — SIGNIFICANT CHANGE UP (ref 3.5–5.3)
POTASSIUM SERPL-SCNC: 3.8 MMOL/L — SIGNIFICANT CHANGE UP (ref 3.5–5.3)
RBC # BLD: 5.05 M/UL — SIGNIFICANT CHANGE UP (ref 3.8–5.2)
RBC # FLD: 13.6 % — SIGNIFICANT CHANGE UP (ref 10.3–14.5)
SODIUM SERPL-SCNC: 140 MMOL/L — SIGNIFICANT CHANGE UP (ref 135–145)
TSH SERPL-MCNC: 2.99 UIU/ML — SIGNIFICANT CHANGE UP (ref 0.27–4.2)
WBC # BLD: 9.56 K/UL — SIGNIFICANT CHANGE UP (ref 3.8–10.5)
WBC # FLD AUTO: 9.56 K/UL — SIGNIFICANT CHANGE UP (ref 3.8–10.5)

## 2025-02-08 PROCEDURE — 99222 1ST HOSP IP/OBS MODERATE 55: CPT | Mod: GC

## 2025-02-08 RX ORDER — ACETAMINOPHEN 500 MG/5ML
650 LIQUID (ML) ORAL EVERY 6 HOURS
Refills: 0 | Status: DISCONTINUED | OUTPATIENT
Start: 2025-02-08 | End: 2025-02-09

## 2025-02-08 RX ADMIN — INSULIN LISPRO 2: 100 INJECTION, SOLUTION INTRAVENOUS; SUBCUTANEOUS at 17:33

## 2025-02-08 RX ADMIN — ATORVASTATIN CALCIUM 40 MILLIGRAM(S): 80 TABLET, FILM COATED ORAL at 23:28

## 2025-02-08 RX ADMIN — Medication 81 MILLIGRAM(S): at 12:26

## 2025-02-08 RX ADMIN — Medication 2 TABLET(S): at 23:28

## 2025-02-08 RX ADMIN — POLYETHYLENE GLYCOL 3350 17 GRAM(S): 17 POWDER, FOR SOLUTION ORAL at 06:10

## 2025-02-08 RX ADMIN — MEMANTINE HYDROCHLORIDE 5 MILLIGRAM(S): 21 CAPSULE, EXTENDED RELEASE ORAL at 12:26

## 2025-02-08 RX ADMIN — Medication 50 MILLIGRAM(S): at 17:34

## 2025-02-08 RX ADMIN — Medication 5 MILLIGRAM(S): at 23:28

## 2025-02-08 RX ADMIN — INSULIN GLARGINE-YFGN 5 UNIT(S): 100 INJECTION, SOLUTION SUBCUTANEOUS at 23:27

## 2025-02-08 RX ADMIN — Medication 40 MILLIGRAM(S): at 17:34

## 2025-02-08 RX ADMIN — ENOXAPARIN SODIUM 40 MILLIGRAM(S): 100 INJECTION SUBCUTANEOUS at 23:29

## 2025-02-09 VITALS
DIASTOLIC BLOOD PRESSURE: 65 MMHG | RESPIRATION RATE: 18 BRPM | OXYGEN SATURATION: 100 % | SYSTOLIC BLOOD PRESSURE: 120 MMHG | HEART RATE: 90 BPM | TEMPERATURE: 98 F

## 2025-02-09 LAB
ANION GAP SERPL CALC-SCNC: 10 MMOL/L — SIGNIFICANT CHANGE UP (ref 7–14)
BUN SERPL-MCNC: 18 MG/DL — SIGNIFICANT CHANGE UP (ref 7–23)
CALCIUM SERPL-MCNC: 8.4 MG/DL — SIGNIFICANT CHANGE UP (ref 8.4–10.5)
CHLORIDE SERPL-SCNC: 107 MMOL/L — SIGNIFICANT CHANGE UP (ref 98–107)
CO2 SERPL-SCNC: 26 MMOL/L — SIGNIFICANT CHANGE UP (ref 22–31)
CREAT SERPL-MCNC: 0.95 MG/DL — SIGNIFICANT CHANGE UP (ref 0.5–1.3)
EGFR: 57 ML/MIN/1.73M2 — LOW
EGFR: 57 ML/MIN/1.73M2 — LOW
GLUCOSE BLDC GLUCOMTR-MCNC: 132 MG/DL — HIGH (ref 70–99)
GLUCOSE BLDC GLUCOMTR-MCNC: 140 MG/DL — HIGH (ref 70–99)
GLUCOSE SERPL-MCNC: 119 MG/DL — HIGH (ref 70–99)
HCT VFR BLD CALC: 44.3 % — SIGNIFICANT CHANGE UP (ref 34.5–45)
HGB BLD-MCNC: 14.8 G/DL — SIGNIFICANT CHANGE UP (ref 11.5–15.5)
MAGNESIUM SERPL-MCNC: 2.1 MG/DL — SIGNIFICANT CHANGE UP (ref 1.6–2.6)
MCHC RBC-ENTMCNC: 32.1 PG — SIGNIFICANT CHANGE UP (ref 27–34)
MCHC RBC-ENTMCNC: 33.4 G/DL — SIGNIFICANT CHANGE UP (ref 32–36)
MCV RBC AUTO: 96.1 FL — SIGNIFICANT CHANGE UP (ref 80–100)
NRBC # BLD AUTO: 0 K/UL — SIGNIFICANT CHANGE UP (ref 0–0)
NRBC # BLD: 0 /100 WBCS — SIGNIFICANT CHANGE UP (ref 0–0)
NRBC # FLD: 0 K/UL — SIGNIFICANT CHANGE UP (ref 0–0)
NRBC BLD-RTO: 0 /100 WBCS — SIGNIFICANT CHANGE UP (ref 0–0)
PHOSPHATE SERPL-MCNC: 3.2 MG/DL — SIGNIFICANT CHANGE UP (ref 2.5–4.5)
PLATELET # BLD AUTO: 254 K/UL — SIGNIFICANT CHANGE UP (ref 150–400)
POTASSIUM SERPL-MCNC: 3.4 MMOL/L — LOW (ref 3.5–5.3)
POTASSIUM SERPL-SCNC: 3.4 MMOL/L — LOW (ref 3.5–5.3)
RBC # BLD: 4.61 M/UL — SIGNIFICANT CHANGE UP (ref 3.8–5.2)
RBC # FLD: 13.4 % — SIGNIFICANT CHANGE UP (ref 10.3–14.5)
SODIUM SERPL-SCNC: 143 MMOL/L — SIGNIFICANT CHANGE UP (ref 135–145)
WBC # BLD: 6.68 K/UL — SIGNIFICANT CHANGE UP (ref 3.8–10.5)
WBC # FLD AUTO: 6.68 K/UL — SIGNIFICANT CHANGE UP (ref 3.8–10.5)

## 2025-02-09 RX ORDER — MAGNESIUM, ALUMINUM HYDROXIDE 200-200 MG
30 TABLET,CHEWABLE ORAL EVERY 6 HOURS
Refills: 0 | Status: DISCONTINUED | OUTPATIENT
Start: 2025-02-09 | End: 2025-02-09

## 2025-02-09 RX ADMIN — POLYETHYLENE GLYCOL 3350 17 GRAM(S): 17 POWDER, FOR SOLUTION ORAL at 12:28

## 2025-02-09 RX ADMIN — LOSARTAN POTASSIUM 100 MILLIGRAM(S): 100 TABLET, FILM COATED ORAL at 05:43

## 2025-02-09 RX ADMIN — Medication 650 MILLIGRAM(S): at 10:09

## 2025-02-09 RX ADMIN — Medication 40 MILLIGRAM(S): at 05:44

## 2025-02-09 RX ADMIN — Medication 30 MILLILITER(S): at 10:11

## 2025-02-09 RX ADMIN — Medication 81 MILLIGRAM(S): at 12:28

## 2025-02-09 RX ADMIN — Medication 50 MILLIGRAM(S): at 05:43

## 2025-02-09 RX ADMIN — Medication 650 MILLIGRAM(S): at 11:06

## 2025-02-09 RX ADMIN — Medication 40 MILLIEQUIVALENT(S): at 10:11

## 2025-02-09 RX ADMIN — MEMANTINE HYDROCHLORIDE 5 MILLIGRAM(S): 21 CAPSULE, EXTENDED RELEASE ORAL at 12:52

## 2025-02-09 RX ADMIN — AMLODIPINE BESYLATE 10 MILLIGRAM(S): 10 TABLET ORAL at 05:43

## 2025-03-29 NOTE — CONSULT NOTE ADULT - SUBJECTIVE AND OBJECTIVE BOX
89y Female presents with complaint of RIGHT shoulder pain s/p mechanical fall. Denies numbness/tingling in affected extremity. Denies headstrike/LOC/other orthopedic injuries at this time. Able to ambulate after fall.     PAST MEDICAL & SURGICAL HISTORY:  HTN (hypertension)      DM (diabetes mellitus)      GERD (gastroesophageal reflux disease)      Disorder of nasal sinus        MEDICATIONS  (STANDING):    Allergies    penicillin (Unknown)    Intolerances                              15.6   9.77  )-----------( 293      ( 29 Oct 2024 13:50 )             46.7     29 Oct 2024 13:50    139    |  105    |  18     ----------------------------<  181    3.4     |  26     |  1.06     Ca    9.4        29 Oct 2024 13:50    TPro  8.1    /  Alb  2.8    /  TBili  0.3    /  DBili  x      /  AST  32     /  ALT  33     /  AlkPhos  87     29 Oct 2024 13:50    PT/INR - ( 29 Oct 2024 13:50 )   PT: 11.2 sec;   INR: 0.97 ratio         PTT - ( 29 Oct 2024 13:50 )  PTT:28.1 sec    Vital Signs Last 24 Hrs  T(C): 36.5 (10-29-24 @ 13:22), Max: 36.5 (10-29-24 @ 13:22)  T(F): 97.7 (10-29-24 @ 13:22), Max: 97.7 (10-29-24 @ 13:22)  HR: 78 (10-29-24 @ 13:22) (78 - 78)  BP: 131/86 (10-29-24 @ 13:22) (131/86 - 131/86)  BP(mean): --  RR: 18 (10-29-24 @ 13:22) (18 - 18)  SpO2: 98% (10-29-24 @ 13:22) (98% - 98%)    PHYSICAL EXAM  General: NAD, Awake and Alert    RIGHT Upper Extremity:  Skin intact   + Anterior shoulder fullness  + Sulcus sign  TTP over the proximal humerus  Unable to range shoulder 2/2 pain  + AIN/PIN/Median/Ulnar/Radial/Musculocutaneous nerves intact  SILT C5-T1  + Radial pulse, brisk capillary refill  Compartments soft and compressible        Procedure:   Procedure explained with discussion of risks, benefits, and alternatives to procedure with patient at bedside. Patient expressed full understanding and all questions were answered. Closed reduction of the shoulder was performed. Post procedure imaging demonstrated anatomic reduction and positioning. There were no complications and the patient tolerated the procedure well. The patient was neurovascularly intact following reduction.        A/P: 89y Female with RIGHT shoulder dislocation, s/p closed reduction  -Pain control  -NWB RIGHT upper extremity in shoulder immobilizer  -Ice as needed  -Active movement of fingers/wrist/elbow encouraged  -ROM of shoulder restricted until advanced by orthopedic attending  -No acute/emergent orthopedic surgical intervention needed at this time  -Possible need for surgical intervention in future discussed with patient  -Follow up outpatient with Dr. Shearer within 1 week, please call office for appointment  -Will discuss with attending, Dr. Shearer, and will advise if plan changes  -Ortho stable for discharge 1

## 2025-04-02 ENCOUNTER — APPOINTMENT (OUTPATIENT)
Dept: NEUROLOGY | Facility: CLINIC | Age: 89
End: 2025-04-02

## 2025-04-02 PROCEDURE — 95909 NRV CNDJ TST 5-6 STUDIES: CPT

## 2025-04-02 PROCEDURE — 95885 MUSC TST DONE W/NERV TST LIM: CPT | Mod: LT,59

## 2025-04-02 PROCEDURE — 95886 MUSC TEST DONE W/N TEST COMP: CPT

## 2025-04-04 ENCOUNTER — APPOINTMENT (OUTPATIENT)
Dept: ORTHOPEDIC SURGERY | Facility: CLINIC | Age: 89
End: 2025-04-04

## 2025-04-04 VITALS — BODY MASS INDEX: 23.6 KG/M2 | WEIGHT: 125 LBS | HEIGHT: 61 IN

## 2025-04-25 ENCOUNTER — APPOINTMENT (OUTPATIENT)
Dept: ORTHOPEDIC SURGERY | Facility: CLINIC | Age: 89
End: 2025-04-25

## 2025-07-02 NOTE — PATIENT PROFILE ADULT - AGENT'S NAME
Per TL- PH capsule currently on backorder, message postponed for follow up and schedule once ph capsule is back in stock.    Reji Rangel (daughter)

## 2025-07-25 ENCOUNTER — RESULT REVIEW (OUTPATIENT)
Age: 89
End: 2025-07-25

## 2025-07-25 ENCOUNTER — INPATIENT (INPATIENT)
Facility: HOSPITAL | Age: 89
LOS: 3 days | Discharge: HOME | End: 2025-07-29
Attending: INTERNAL MEDICINE | Admitting: INTERNAL MEDICINE
Payer: MEDICARE

## 2025-07-25 VITALS
SYSTOLIC BLOOD PRESSURE: 122 MMHG | RESPIRATION RATE: 18 BRPM | WEIGHT: 145.95 LBS | OXYGEN SATURATION: 99 % | DIASTOLIC BLOOD PRESSURE: 76 MMHG | HEART RATE: 87 BPM | TEMPERATURE: 98 F

## 2025-07-25 DIAGNOSIS — R10.9 UNSPECIFIED ABDOMINAL PAIN: ICD-10-CM

## 2025-07-25 DIAGNOSIS — E11.9 TYPE 2 DIABETES MELLITUS WITHOUT COMPLICATIONS: ICD-10-CM

## 2025-07-25 DIAGNOSIS — R79.89 OTHER SPECIFIED ABNORMAL FINDINGS OF BLOOD CHEMISTRY: ICD-10-CM

## 2025-07-25 DIAGNOSIS — I10 ESSENTIAL (PRIMARY) HYPERTENSION: ICD-10-CM

## 2025-07-25 DIAGNOSIS — I50.9 HEART FAILURE, UNSPECIFIED: ICD-10-CM

## 2025-07-25 DIAGNOSIS — F03.90 UNSPECIFIED DEMENTIA, UNSPECIFIED SEVERITY, WITHOUT BEHAVIORAL DISTURBANCE, PSYCHOTIC DISTURBANCE, MOOD DISTURBANCE, AND ANXIETY: ICD-10-CM

## 2025-07-25 DIAGNOSIS — J34.9 UNSPECIFIED DISORDER OF NOSE AND NASAL SINUSES: Chronic | ICD-10-CM

## 2025-07-25 LAB
ADD ON TEST-SPECIMEN IN LAB: SIGNIFICANT CHANGE UP
ALBUMIN SERPL ELPH-MCNC: 2.2 G/DL — LOW (ref 3.3–5)
ALP SERPL-CCNC: 109 U/L — SIGNIFICANT CHANGE UP (ref 40–120)
ALT FLD-CCNC: 43 U/L — HIGH (ref 4–33)
ANION GAP SERPL CALC-SCNC: 10 MMOL/L — SIGNIFICANT CHANGE UP (ref 7–14)
ANION GAP SERPL CALC-SCNC: 9 MMOL/L — SIGNIFICANT CHANGE UP (ref 7–14)
AST SERPL-CCNC: 34 U/L — HIGH (ref 4–32)
BASOPHILS # BLD AUTO: 0.02 K/UL — SIGNIFICANT CHANGE UP (ref 0–0.2)
BASOPHILS NFR BLD AUTO: 0.3 % — SIGNIFICANT CHANGE UP (ref 0–2)
BILIRUB SERPL-MCNC: 0.2 MG/DL — SIGNIFICANT CHANGE UP (ref 0.2–1.2)
BUN SERPL-MCNC: 10 MG/DL — SIGNIFICANT CHANGE UP (ref 7–23)
BUN SERPL-MCNC: 10 MG/DL — SIGNIFICANT CHANGE UP (ref 7–23)
CALCIUM SERPL-MCNC: 8.1 MG/DL — LOW (ref 8.4–10.5)
CALCIUM SERPL-MCNC: 8.2 MG/DL — LOW (ref 8.4–10.5)
CHLORIDE SERPL-SCNC: 106 MMOL/L — SIGNIFICANT CHANGE UP (ref 98–107)
CHLORIDE SERPL-SCNC: 106 MMOL/L — SIGNIFICANT CHANGE UP (ref 98–107)
CO2 SERPL-SCNC: 24 MMOL/L — SIGNIFICANT CHANGE UP (ref 22–31)
CO2 SERPL-SCNC: 24 MMOL/L — SIGNIFICANT CHANGE UP (ref 22–31)
CREAT SERPL-MCNC: 0.77 MG/DL — SIGNIFICANT CHANGE UP (ref 0.5–1.3)
CREAT SERPL-MCNC: 0.78 MG/DL — SIGNIFICANT CHANGE UP (ref 0.5–1.3)
EGFR: 72 ML/MIN/1.73M2 — SIGNIFICANT CHANGE UP
EGFR: 72 ML/MIN/1.73M2 — SIGNIFICANT CHANGE UP
EGFR: 73 ML/MIN/1.73M2 — SIGNIFICANT CHANGE UP
EGFR: 73 ML/MIN/1.73M2 — SIGNIFICANT CHANGE UP
EOSINOPHIL # BLD AUTO: 0.02 K/UL — SIGNIFICANT CHANGE UP (ref 0–0.5)
EOSINOPHIL NFR BLD AUTO: 0.3 % — SIGNIFICANT CHANGE UP (ref 0–6)
GAS PNL BLDV: SIGNIFICANT CHANGE UP
GLUCOSE BLDC GLUCOMTR-MCNC: 128 MG/DL — HIGH (ref 70–99)
GLUCOSE SERPL-MCNC: 107 MG/DL — HIGH (ref 70–99)
GLUCOSE SERPL-MCNC: 117 MG/DL — HIGH (ref 70–99)
HCT VFR BLD CALC: 43.3 % — SIGNIFICANT CHANGE UP (ref 34.5–45)
HGB BLD-MCNC: 14.7 G/DL — SIGNIFICANT CHANGE UP (ref 11.5–15.5)
IMM GRANULOCYTES # BLD AUTO: 0.01 K/UL — SIGNIFICANT CHANGE UP (ref 0–0.07)
IMM GRANULOCYTES NFR BLD AUTO: 0.2 % — SIGNIFICANT CHANGE UP (ref 0–0.9)
LYMPHOCYTES # BLD AUTO: 1.46 K/UL — SIGNIFICANT CHANGE UP (ref 1–3.3)
LYMPHOCYTES NFR BLD AUTO: 25.1 % — SIGNIFICANT CHANGE UP (ref 13–44)
MAGNESIUM SERPL-MCNC: 1.9 MG/DL — SIGNIFICANT CHANGE UP (ref 1.6–2.6)
MCHC RBC-ENTMCNC: 31.7 PG — SIGNIFICANT CHANGE UP (ref 27–34)
MCHC RBC-ENTMCNC: 33.9 G/DL — SIGNIFICANT CHANGE UP (ref 32–36)
MCV RBC AUTO: 93.5 FL — SIGNIFICANT CHANGE UP (ref 80–100)
MONOCYTES # BLD AUTO: 0.34 K/UL — SIGNIFICANT CHANGE UP (ref 0–0.9)
MONOCYTES NFR BLD AUTO: 5.8 % — SIGNIFICANT CHANGE UP (ref 2–14)
NEUTROPHILS # BLD AUTO: 3.97 K/UL — SIGNIFICANT CHANGE UP (ref 1.8–7.4)
NEUTROPHILS NFR BLD AUTO: 68.3 % — SIGNIFICANT CHANGE UP (ref 43–77)
NRBC # BLD AUTO: 0 K/UL — SIGNIFICANT CHANGE UP (ref 0–0)
NRBC # FLD: 0 K/UL — SIGNIFICANT CHANGE UP (ref 0–0)
NRBC BLD AUTO-RTO: 0 /100 WBCS — SIGNIFICANT CHANGE UP (ref 0–0)
NT-PROBNP SERPL-SCNC: 4142 PG/ML — HIGH
PHOSPHATE SERPL-MCNC: 3.1 MG/DL — SIGNIFICANT CHANGE UP (ref 2.5–4.5)
PLATELET # BLD AUTO: 252 K/UL — SIGNIFICANT CHANGE UP (ref 150–400)
PMV BLD: 10.9 FL — SIGNIFICANT CHANGE UP (ref 7–13)
POTASSIUM SERPL-MCNC: 3.8 MMOL/L — SIGNIFICANT CHANGE UP (ref 3.5–5.3)
POTASSIUM SERPL-MCNC: 3.9 MMOL/L — SIGNIFICANT CHANGE UP (ref 3.5–5.3)
POTASSIUM SERPL-SCNC: 3.8 MMOL/L — SIGNIFICANT CHANGE UP (ref 3.5–5.3)
POTASSIUM SERPL-SCNC: 3.9 MMOL/L — SIGNIFICANT CHANGE UP (ref 3.5–5.3)
PROT SERPL-MCNC: 5.5 G/DL — LOW (ref 6–8.3)
RBC # BLD: 4.63 M/UL — SIGNIFICANT CHANGE UP (ref 3.8–5.2)
RBC # FLD: 14.1 % — SIGNIFICANT CHANGE UP (ref 10.3–14.5)
SODIUM SERPL-SCNC: 139 MMOL/L — SIGNIFICANT CHANGE UP (ref 135–145)
SODIUM SERPL-SCNC: 140 MMOL/L — SIGNIFICANT CHANGE UP (ref 135–145)
TROPONIN T, HIGH SENSITIVITY RESULT: 215 NG/L — CRITICAL HIGH
TROPONIN T, HIGH SENSITIVITY RESULT: 225 NG/L — CRITICAL HIGH
WBC # BLD: 5.82 K/UL — SIGNIFICANT CHANGE UP (ref 3.8–10.5)
WBC # FLD AUTO: 5.82 K/UL — SIGNIFICANT CHANGE UP (ref 3.8–10.5)

## 2025-07-25 PROCEDURE — 71046 X-RAY EXAM CHEST 2 VIEWS: CPT | Mod: 26

## 2025-07-25 PROCEDURE — 76376 3D RENDER W/INTRP POSTPROCES: CPT | Mod: 26

## 2025-07-25 PROCEDURE — 74177 CT ABD & PELVIS W/CONTRAST: CPT | Mod: 26

## 2025-07-25 PROCEDURE — 99285 EMERGENCY DEPT VISIT HI MDM: CPT | Mod: GC

## 2025-07-25 PROCEDURE — 93356 MYOCRD STRAIN IMG SPCKL TRCK: CPT

## 2025-07-25 PROCEDURE — 93306 TTE W/DOPPLER COMPLETE: CPT | Mod: 26

## 2025-07-25 RX ORDER — SODIUM CHLORIDE 9 G/1000ML
1000 INJECTION, SOLUTION INTRAVENOUS
Refills: 0 | Status: DISCONTINUED | OUTPATIENT
Start: 2025-07-25 | End: 2025-07-29

## 2025-07-25 RX ORDER — LOSARTAN POTASSIUM 100 MG/1
100 TABLET, FILM COATED ORAL DAILY
Refills: 0 | Status: DISCONTINUED | OUTPATIENT
Start: 2025-07-25 | End: 2025-07-29

## 2025-07-25 RX ORDER — INSULIN LISPRO 100 U/ML
INJECTION, SOLUTION INTRAVENOUS; SUBCUTANEOUS AT BEDTIME
Refills: 0 | Status: DISCONTINUED | OUTPATIENT
Start: 2025-07-25 | End: 2025-07-29

## 2025-07-25 RX ORDER — ATORVASTATIN CALCIUM 80 MG/1
40 TABLET, FILM COATED ORAL AT BEDTIME
Refills: 0 | Status: DISCONTINUED | OUTPATIENT
Start: 2025-07-25 | End: 2025-07-29

## 2025-07-25 RX ORDER — FUROSEMIDE 10 MG/ML
20 INJECTION INTRAMUSCULAR; INTRAVENOUS ONCE
Refills: 0 | Status: COMPLETED | OUTPATIENT
Start: 2025-07-25 | End: 2025-07-25

## 2025-07-25 RX ORDER — AMLODIPINE BESYLATE 10 MG/1
10 TABLET ORAL DAILY
Refills: 0 | Status: DISCONTINUED | OUTPATIENT
Start: 2025-07-25 | End: 2025-07-25

## 2025-07-25 RX ORDER — ASPIRIN 325 MG
162 TABLET ORAL ONCE
Refills: 0 | Status: COMPLETED | OUTPATIENT
Start: 2025-07-25 | End: 2025-07-25

## 2025-07-25 RX ORDER — POLYETHYLENE GLYCOL 3350 17 G/17G
17 POWDER, FOR SOLUTION ORAL DAILY
Refills: 0 | Status: DISCONTINUED | OUTPATIENT
Start: 2025-07-25 | End: 2025-07-26

## 2025-07-25 RX ORDER — DEXTROSE 50 % IN WATER 50 %
12.5 SYRINGE (ML) INTRAVENOUS ONCE
Refills: 0 | Status: DISCONTINUED | OUTPATIENT
Start: 2025-07-25 | End: 2025-07-29

## 2025-07-25 RX ORDER — MEMANTINE HYDROCHLORIDE 21 MG/1
5 CAPSULE, EXTENDED RELEASE ORAL DAILY
Refills: 0 | Status: DISCONTINUED | OUTPATIENT
Start: 2025-07-25 | End: 2025-07-29

## 2025-07-25 RX ORDER — INSULIN LISPRO 100 U/ML
INJECTION, SOLUTION INTRAVENOUS; SUBCUTANEOUS
Refills: 0 | Status: DISCONTINUED | OUTPATIENT
Start: 2025-07-25 | End: 2025-07-29

## 2025-07-25 RX ORDER — GLUCAGON 3 MG/1
1 POWDER NASAL ONCE
Refills: 0 | Status: DISCONTINUED | OUTPATIENT
Start: 2025-07-25 | End: 2025-07-29

## 2025-07-25 RX ORDER — ASPIRIN 325 MG
81 TABLET ORAL DAILY
Refills: 0 | Status: DISCONTINUED | OUTPATIENT
Start: 2025-07-25 | End: 2025-07-29

## 2025-07-25 RX ORDER — INSULIN GLARGINE-YFGN 100 [IU]/ML
5 INJECTION, SOLUTION SUBCUTANEOUS AT BEDTIME
Refills: 0 | Status: DISCONTINUED | OUTPATIENT
Start: 2025-07-25 | End: 2025-07-29

## 2025-07-25 RX ORDER — DEXTROSE 50 % IN WATER 50 %
25 SYRINGE (ML) INTRAVENOUS ONCE
Refills: 0 | Status: DISCONTINUED | OUTPATIENT
Start: 2025-07-25 | End: 2025-07-29

## 2025-07-25 RX ORDER — SENNA 187 MG
2 TABLET ORAL AT BEDTIME
Refills: 0 | Status: DISCONTINUED | OUTPATIENT
Start: 2025-07-25 | End: 2025-07-29

## 2025-07-25 RX ORDER — DEXTROSE 50 % IN WATER 50 %
15 SYRINGE (ML) INTRAVENOUS ONCE
Refills: 0 | Status: DISCONTINUED | OUTPATIENT
Start: 2025-07-25 | End: 2025-07-29

## 2025-07-25 RX ADMIN — FUROSEMIDE 20 MILLIGRAM(S): 10 INJECTION INTRAMUSCULAR; INTRAVENOUS at 11:15

## 2025-07-25 RX ADMIN — ATORVASTATIN CALCIUM 40 MILLIGRAM(S): 80 TABLET, FILM COATED ORAL at 22:58

## 2025-07-25 RX ADMIN — Medication 2 TABLET(S): at 22:59

## 2025-07-25 RX ADMIN — Medication 162 MILLIGRAM(S): at 11:56

## 2025-07-25 RX ADMIN — INSULIN GLARGINE-YFGN 5 UNIT(S): 100 INJECTION, SOLUTION SUBCUTANEOUS at 23:01

## 2025-07-26 LAB
A1C WITH ESTIMATED AVERAGE GLUCOSE RESULT: 6.5 % — HIGH (ref 4–5.6)
ANION GAP SERPL CALC-SCNC: 10 MMOL/L — SIGNIFICANT CHANGE UP (ref 7–14)
BUN SERPL-MCNC: 10 MG/DL — SIGNIFICANT CHANGE UP (ref 7–23)
CALCIUM SERPL-MCNC: 8.4 MG/DL — SIGNIFICANT CHANGE UP (ref 8.4–10.5)
CHLORIDE SERPL-SCNC: 106 MMOL/L — SIGNIFICANT CHANGE UP (ref 98–107)
CO2 SERPL-SCNC: 27 MMOL/L — SIGNIFICANT CHANGE UP (ref 22–31)
CREAT SERPL-MCNC: 0.87 MG/DL — SIGNIFICANT CHANGE UP (ref 0.5–1.3)
EGFR: 63 ML/MIN/1.73M2 — SIGNIFICANT CHANGE UP
EGFR: 63 ML/MIN/1.73M2 — SIGNIFICANT CHANGE UP
ESTIMATED AVERAGE GLUCOSE: 140 — SIGNIFICANT CHANGE UP
GLUCOSE BLDC GLUCOMTR-MCNC: 117 MG/DL — HIGH (ref 70–99)
GLUCOSE BLDC GLUCOMTR-MCNC: 130 MG/DL — HIGH (ref 70–99)
GLUCOSE BLDC GLUCOMTR-MCNC: 164 MG/DL — HIGH (ref 70–99)
GLUCOSE BLDC GLUCOMTR-MCNC: 93 MG/DL — SIGNIFICANT CHANGE UP (ref 70–99)
GLUCOSE SERPL-MCNC: 95 MG/DL — SIGNIFICANT CHANGE UP (ref 70–99)
HCT VFR BLD CALC: 48.9 % — HIGH (ref 34.5–45)
HGB BLD-MCNC: 15.9 G/DL — HIGH (ref 11.5–15.5)
MCHC RBC-ENTMCNC: 31.2 PG — SIGNIFICANT CHANGE UP (ref 27–34)
MCHC RBC-ENTMCNC: 32.5 G/DL — SIGNIFICANT CHANGE UP (ref 32–36)
MCV RBC AUTO: 95.9 FL — SIGNIFICANT CHANGE UP (ref 80–100)
NRBC # BLD AUTO: 0 K/UL — SIGNIFICANT CHANGE UP (ref 0–0)
NRBC # FLD: 0 K/UL — SIGNIFICANT CHANGE UP (ref 0–0)
NRBC BLD AUTO-RTO: 0 /100 WBCS — SIGNIFICANT CHANGE UP (ref 0–0)
PLATELET # BLD AUTO: 276 K/UL — SIGNIFICANT CHANGE UP (ref 150–400)
PMV BLD: 11 FL — SIGNIFICANT CHANGE UP (ref 7–13)
POTASSIUM SERPL-MCNC: 3.9 MMOL/L — SIGNIFICANT CHANGE UP (ref 3.5–5.3)
POTASSIUM SERPL-SCNC: 3.9 MMOL/L — SIGNIFICANT CHANGE UP (ref 3.5–5.3)
RBC # BLD: 5.1 M/UL — SIGNIFICANT CHANGE UP (ref 3.8–5.2)
RBC # FLD: 14.4 % — SIGNIFICANT CHANGE UP (ref 10.3–14.5)
SODIUM SERPL-SCNC: 143 MMOL/L — SIGNIFICANT CHANGE UP (ref 135–145)
WBC # BLD: 8.88 K/UL — SIGNIFICANT CHANGE UP (ref 3.8–10.5)
WBC # FLD AUTO: 8.88 K/UL — SIGNIFICANT CHANGE UP (ref 3.8–10.5)

## 2025-07-26 RX ORDER — FUROSEMIDE 10 MG/ML
20 INJECTION INTRAMUSCULAR; INTRAVENOUS ONCE
Refills: 0 | Status: COMPLETED | OUTPATIENT
Start: 2025-07-26 | End: 2025-07-26

## 2025-07-26 RX ORDER — POLYETHYLENE GLYCOL 3350 17 G/17G
17 POWDER, FOR SOLUTION ORAL DAILY
Refills: 0 | Status: DISCONTINUED | OUTPATIENT
Start: 2025-07-26 | End: 2025-07-28

## 2025-07-26 RX ADMIN — FUROSEMIDE 20 MILLIGRAM(S): 10 INJECTION INTRAMUSCULAR; INTRAVENOUS at 11:35

## 2025-07-26 RX ADMIN — Medication 2 TABLET(S): at 21:27

## 2025-07-26 RX ADMIN — Medication 40 MILLIGRAM(S): at 06:02

## 2025-07-26 RX ADMIN — POLYETHYLENE GLYCOL 3350 17 GRAM(S): 17 POWDER, FOR SOLUTION ORAL at 11:33

## 2025-07-26 RX ADMIN — LOSARTAN POTASSIUM 100 MILLIGRAM(S): 100 TABLET, FILM COATED ORAL at 06:03

## 2025-07-26 RX ADMIN — INSULIN GLARGINE-YFGN 5 UNIT(S): 100 INJECTION, SOLUTION SUBCUTANEOUS at 21:37

## 2025-07-26 RX ADMIN — ATORVASTATIN CALCIUM 40 MILLIGRAM(S): 80 TABLET, FILM COATED ORAL at 21:27

## 2025-07-26 RX ADMIN — INSULIN LISPRO 2: 100 INJECTION, SOLUTION INTRAVENOUS; SUBCUTANEOUS at 11:57

## 2025-07-26 RX ADMIN — MEMANTINE HYDROCHLORIDE 5 MILLIGRAM(S): 21 CAPSULE, EXTENDED RELEASE ORAL at 11:33

## 2025-07-26 RX ADMIN — Medication 81 MILLIGRAM(S): at 11:33

## 2025-07-27 LAB
ANION GAP SERPL CALC-SCNC: 10 MMOL/L — SIGNIFICANT CHANGE UP (ref 7–14)
BUN SERPL-MCNC: 12 MG/DL — SIGNIFICANT CHANGE UP (ref 7–23)
CALCIUM SERPL-MCNC: 8.5 MG/DL — SIGNIFICANT CHANGE UP (ref 8.4–10.5)
CHLORIDE SERPL-SCNC: 105 MMOL/L — SIGNIFICANT CHANGE UP (ref 98–107)
CO2 SERPL-SCNC: 28 MMOL/L — SIGNIFICANT CHANGE UP (ref 22–31)
CREAT SERPL-MCNC: 0.96 MG/DL — SIGNIFICANT CHANGE UP (ref 0.5–1.3)
EGFR: 56 ML/MIN/1.73M2 — LOW
EGFR: 56 ML/MIN/1.73M2 — LOW
GLUCOSE BLDC GLUCOMTR-MCNC: 132 MG/DL — HIGH (ref 70–99)
GLUCOSE BLDC GLUCOMTR-MCNC: 141 MG/DL — HIGH (ref 70–99)
GLUCOSE BLDC GLUCOMTR-MCNC: 216 MG/DL — HIGH (ref 70–99)
GLUCOSE BLDC GLUCOMTR-MCNC: 93 MG/DL — SIGNIFICANT CHANGE UP (ref 70–99)
GLUCOSE SERPL-MCNC: 121 MG/DL — HIGH (ref 70–99)
HCT VFR BLD CALC: 46.6 % — HIGH (ref 34.5–45)
HGB BLD-MCNC: 15 G/DL — SIGNIFICANT CHANGE UP (ref 11.5–15.5)
MAGNESIUM SERPL-MCNC: 2.1 MG/DL — SIGNIFICANT CHANGE UP (ref 1.6–2.6)
MCHC RBC-ENTMCNC: 31.2 PG — SIGNIFICANT CHANGE UP (ref 27–34)
MCHC RBC-ENTMCNC: 32.2 G/DL — SIGNIFICANT CHANGE UP (ref 32–36)
MCV RBC AUTO: 96.9 FL — SIGNIFICANT CHANGE UP (ref 80–100)
MRSA PCR RESULT.: SIGNIFICANT CHANGE UP
NRBC # BLD AUTO: 0 K/UL — SIGNIFICANT CHANGE UP (ref 0–0)
NRBC # FLD: 0 K/UL — SIGNIFICANT CHANGE UP (ref 0–0)
NRBC BLD AUTO-RTO: 0 /100 WBCS — SIGNIFICANT CHANGE UP (ref 0–0)
PLATELET # BLD AUTO: 245 K/UL — SIGNIFICANT CHANGE UP (ref 150–400)
PMV BLD: 11 FL — SIGNIFICANT CHANGE UP (ref 7–13)
POTASSIUM SERPL-MCNC: 5.3 MMOL/L — SIGNIFICANT CHANGE UP (ref 3.5–5.3)
POTASSIUM SERPL-SCNC: 5.3 MMOL/L — SIGNIFICANT CHANGE UP (ref 3.5–5.3)
RBC # BLD: 4.81 M/UL — SIGNIFICANT CHANGE UP (ref 3.8–5.2)
RBC # FLD: 14.4 % — SIGNIFICANT CHANGE UP (ref 10.3–14.5)
S AUREUS DNA NOSE QL NAA+PROBE: SIGNIFICANT CHANGE UP
SODIUM SERPL-SCNC: 143 MMOL/L — SIGNIFICANT CHANGE UP (ref 135–145)
WBC # BLD: 9.13 K/UL — SIGNIFICANT CHANGE UP (ref 3.8–10.5)
WBC # FLD AUTO: 9.13 K/UL — SIGNIFICANT CHANGE UP (ref 3.8–10.5)

## 2025-07-27 RX ORDER — FUROSEMIDE 10 MG/ML
20 INJECTION INTRAMUSCULAR; INTRAVENOUS DAILY
Refills: 0 | Status: DISCONTINUED | OUTPATIENT
Start: 2025-07-27 | End: 2025-07-29

## 2025-07-27 RX ORDER — ENOXAPARIN SODIUM 100 MG/ML
40 INJECTION SUBCUTANEOUS EVERY 24 HOURS
Refills: 0 | Status: DISCONTINUED | OUTPATIENT
Start: 2025-07-27 | End: 2025-07-29

## 2025-07-27 RX ADMIN — MEMANTINE HYDROCHLORIDE 5 MILLIGRAM(S): 21 CAPSULE, EXTENDED RELEASE ORAL at 11:24

## 2025-07-27 RX ADMIN — Medication 50 MILLIGRAM(S): at 06:46

## 2025-07-27 RX ADMIN — Medication 81 MILLIGRAM(S): at 11:24

## 2025-07-27 RX ADMIN — Medication 2 TABLET(S): at 21:30

## 2025-07-27 RX ADMIN — POLYETHYLENE GLYCOL 3350 17 GRAM(S): 17 POWDER, FOR SOLUTION ORAL at 11:23

## 2025-07-27 RX ADMIN — ATORVASTATIN CALCIUM 40 MILLIGRAM(S): 80 TABLET, FILM COATED ORAL at 21:28

## 2025-07-27 RX ADMIN — INSULIN LISPRO 4: 100 INJECTION, SOLUTION INTRAVENOUS; SUBCUTANEOUS at 11:54

## 2025-07-27 RX ADMIN — Medication 40 MILLIGRAM(S): at 06:46

## 2025-07-27 RX ADMIN — INSULIN GLARGINE-YFGN 5 UNIT(S): 100 INJECTION, SOLUTION SUBCUTANEOUS at 21:29

## 2025-07-27 RX ADMIN — Medication 1 APPLICATION(S): at 11:24

## 2025-07-27 RX ADMIN — LOSARTAN POTASSIUM 100 MILLIGRAM(S): 100 TABLET, FILM COATED ORAL at 06:46

## 2025-07-27 RX ADMIN — ENOXAPARIN SODIUM 40 MILLIGRAM(S): 100 INJECTION SUBCUTANEOUS at 21:28

## 2025-07-28 ENCOUNTER — TRANSCRIPTION ENCOUNTER (OUTPATIENT)
Age: 89
End: 2025-07-28

## 2025-07-28 LAB
ANION GAP SERPL CALC-SCNC: 9 MMOL/L — SIGNIFICANT CHANGE UP (ref 7–14)
BUN SERPL-MCNC: 8 MG/DL — SIGNIFICANT CHANGE UP (ref 7–23)
CALCIUM SERPL-MCNC: 8.1 MG/DL — LOW (ref 8.4–10.5)
CHLORIDE SERPL-SCNC: 104 MMOL/L — SIGNIFICANT CHANGE UP (ref 98–107)
CO2 SERPL-SCNC: 27 MMOL/L — SIGNIFICANT CHANGE UP (ref 22–31)
CREAT SERPL-MCNC: 0.76 MG/DL — SIGNIFICANT CHANGE UP (ref 0.5–1.3)
EGFR: 74 ML/MIN/1.73M2 — SIGNIFICANT CHANGE UP
EGFR: 74 ML/MIN/1.73M2 — SIGNIFICANT CHANGE UP
GLUCOSE BLDC GLUCOMTR-MCNC: 105 MG/DL — HIGH (ref 70–99)
GLUCOSE BLDC GLUCOMTR-MCNC: 124 MG/DL — HIGH (ref 70–99)
GLUCOSE BLDC GLUCOMTR-MCNC: 176 MG/DL — HIGH (ref 70–99)
GLUCOSE BLDC GLUCOMTR-MCNC: 88 MG/DL — SIGNIFICANT CHANGE UP (ref 70–99)
GLUCOSE SERPL-MCNC: 104 MG/DL — HIGH (ref 70–99)
HCT VFR BLD CALC: 44.7 % — SIGNIFICANT CHANGE UP (ref 34.5–45)
HCT VFR BLD CALC: 51.1 % — HIGH (ref 34.5–45)
HGB BLD-MCNC: 15 G/DL — SIGNIFICANT CHANGE UP (ref 11.5–15.5)
HGB BLD-MCNC: 16.5 G/DL — HIGH (ref 11.5–15.5)
MAGNESIUM SERPL-MCNC: 2 MG/DL — SIGNIFICANT CHANGE UP (ref 1.6–2.6)
MCHC RBC-ENTMCNC: 31.1 PG — SIGNIFICANT CHANGE UP (ref 27–34)
MCHC RBC-ENTMCNC: 31.3 PG — SIGNIFICANT CHANGE UP (ref 27–34)
MCHC RBC-ENTMCNC: 32.3 G/DL — SIGNIFICANT CHANGE UP (ref 32–36)
MCHC RBC-ENTMCNC: 33.6 G/DL — SIGNIFICANT CHANGE UP (ref 32–36)
MCV RBC AUTO: 93.3 FL — SIGNIFICANT CHANGE UP (ref 80–100)
MCV RBC AUTO: 96.2 FL — SIGNIFICANT CHANGE UP (ref 80–100)
NRBC # BLD AUTO: 0 K/UL — SIGNIFICANT CHANGE UP (ref 0–0)
NRBC # BLD AUTO: 0 K/UL — SIGNIFICANT CHANGE UP (ref 0–0)
NRBC # FLD: 0 K/UL — SIGNIFICANT CHANGE UP (ref 0–0)
NRBC # FLD: 0 K/UL — SIGNIFICANT CHANGE UP (ref 0–0)
NRBC BLD AUTO-RTO: 0 /100 WBCS — SIGNIFICANT CHANGE UP (ref 0–0)
NRBC BLD AUTO-RTO: 0 /100 WBCS — SIGNIFICANT CHANGE UP (ref 0–0)
OB PNL STL: POSITIVE
PLATELET # BLD AUTO: 239 K/UL — SIGNIFICANT CHANGE UP (ref 150–400)
PLATELET # BLD AUTO: 294 K/UL — SIGNIFICANT CHANGE UP (ref 150–400)
PMV BLD: 10.9 FL — SIGNIFICANT CHANGE UP (ref 7–13)
PMV BLD: 11 FL — SIGNIFICANT CHANGE UP (ref 7–13)
POTASSIUM SERPL-MCNC: 3.6 MMOL/L — SIGNIFICANT CHANGE UP (ref 3.5–5.3)
POTASSIUM SERPL-SCNC: 3.6 MMOL/L — SIGNIFICANT CHANGE UP (ref 3.5–5.3)
RBC # BLD: 4.79 M/UL — SIGNIFICANT CHANGE UP (ref 3.8–5.2)
RBC # BLD: 5.31 M/UL — HIGH (ref 3.8–5.2)
RBC # FLD: 14.1 % — SIGNIFICANT CHANGE UP (ref 10.3–14.5)
RBC # FLD: 14.4 % — SIGNIFICANT CHANGE UP (ref 10.3–14.5)
SODIUM SERPL-SCNC: 140 MMOL/L — SIGNIFICANT CHANGE UP (ref 135–145)
WBC # BLD: 6.09 K/UL — SIGNIFICANT CHANGE UP (ref 3.8–10.5)
WBC # BLD: 6.53 K/UL — SIGNIFICANT CHANGE UP (ref 3.8–10.5)
WBC # FLD AUTO: 6.09 K/UL — SIGNIFICANT CHANGE UP (ref 3.8–10.5)
WBC # FLD AUTO: 6.53 K/UL — SIGNIFICANT CHANGE UP (ref 3.8–10.5)

## 2025-07-28 PROCEDURE — 99222 1ST HOSP IP/OBS MODERATE 55: CPT | Mod: GC

## 2025-07-28 RX ORDER — ACETAMINOPHEN 500 MG/5ML
650 LIQUID (ML) ORAL EVERY 6 HOURS
Refills: 0 | Status: DISCONTINUED | OUTPATIENT
Start: 2025-07-28 | End: 2025-07-29

## 2025-07-28 RX ORDER — POLYETHYLENE GLYCOL 3350 17 G/17G
17 POWDER, FOR SOLUTION ORAL
Refills: 0 | Status: DISCONTINUED | OUTPATIENT
Start: 2025-07-28 | End: 2025-07-29

## 2025-07-28 RX ORDER — ESCITALOPRAM OXALATE 20 MG/1
15 TABLET ORAL AT BEDTIME
Refills: 0 | Status: DISCONTINUED | OUTPATIENT
Start: 2025-07-28 | End: 2025-07-29

## 2025-07-28 RX ADMIN — Medication 650 MILLIGRAM(S): at 18:14

## 2025-07-28 RX ADMIN — Medication 2 TABLET(S): at 22:12

## 2025-07-28 RX ADMIN — INSULIN GLARGINE-YFGN 5 UNIT(S): 100 INJECTION, SOLUTION SUBCUTANEOUS at 22:20

## 2025-07-28 RX ADMIN — Medication 40 MILLIGRAM(S): at 05:50

## 2025-07-28 RX ADMIN — MEMANTINE HYDROCHLORIDE 5 MILLIGRAM(S): 21 CAPSULE, EXTENDED RELEASE ORAL at 11:08

## 2025-07-28 RX ADMIN — INSULIN LISPRO 2: 100 INJECTION, SOLUTION INTRAVENOUS; SUBCUTANEOUS at 11:07

## 2025-07-28 RX ADMIN — ATORVASTATIN CALCIUM 40 MILLIGRAM(S): 80 TABLET, FILM COATED ORAL at 22:12

## 2025-07-28 RX ADMIN — Medication 650 MILLIGRAM(S): at 19:14

## 2025-07-28 RX ADMIN — POLYETHYLENE GLYCOL 3350 17 GRAM(S): 17 POWDER, FOR SOLUTION ORAL at 11:08

## 2025-07-28 RX ADMIN — FUROSEMIDE 20 MILLIGRAM(S): 10 INJECTION INTRAMUSCULAR; INTRAVENOUS at 05:50

## 2025-07-28 RX ADMIN — ESCITALOPRAM OXALATE 15 MILLIGRAM(S): 20 TABLET ORAL at 22:15

## 2025-07-28 RX ADMIN — Medication 81 MILLIGRAM(S): at 11:08

## 2025-07-28 RX ADMIN — LOSARTAN POTASSIUM 100 MILLIGRAM(S): 100 TABLET, FILM COATED ORAL at 11:08

## 2025-07-28 RX ADMIN — ENOXAPARIN SODIUM 40 MILLIGRAM(S): 100 INJECTION SUBCUTANEOUS at 22:13

## 2025-07-28 RX ADMIN — Medication 50 MILLIGRAM(S): at 17:01

## 2025-07-29 VITALS
OXYGEN SATURATION: 100 % | SYSTOLIC BLOOD PRESSURE: 132 MMHG | TEMPERATURE: 98 F | DIASTOLIC BLOOD PRESSURE: 69 MMHG | RESPIRATION RATE: 18 BRPM | HEART RATE: 87 BPM

## 2025-07-29 LAB
ANION GAP SERPL CALC-SCNC: 6 MMOL/L — LOW (ref 7–14)
BUN SERPL-MCNC: 12 MG/DL — SIGNIFICANT CHANGE UP (ref 7–23)
CALCIUM SERPL-MCNC: 8.3 MG/DL — LOW (ref 8.4–10.5)
CHLORIDE SERPL-SCNC: 103 MMOL/L — SIGNIFICANT CHANGE UP (ref 98–107)
CO2 SERPL-SCNC: 31 MMOL/L — SIGNIFICANT CHANGE UP (ref 22–31)
CREAT SERPL-MCNC: 0.83 MG/DL — SIGNIFICANT CHANGE UP (ref 0.5–1.3)
EGFR: 67 ML/MIN/1.73M2 — SIGNIFICANT CHANGE UP
EGFR: 67 ML/MIN/1.73M2 — SIGNIFICANT CHANGE UP
GLUCOSE BLDC GLUCOMTR-MCNC: 115 MG/DL — HIGH (ref 70–99)
GLUCOSE BLDC GLUCOMTR-MCNC: 152 MG/DL — HIGH (ref 70–99)
GLUCOSE SERPL-MCNC: 157 MG/DL — HIGH (ref 70–99)
HCT VFR BLD CALC: 45.6 % — HIGH (ref 34.5–45)
HGB BLD-MCNC: 15.3 G/DL — SIGNIFICANT CHANGE UP (ref 11.5–15.5)
MAGNESIUM SERPL-MCNC: 2 MG/DL — SIGNIFICANT CHANGE UP (ref 1.6–2.6)
MCHC RBC-ENTMCNC: 31.5 PG — SIGNIFICANT CHANGE UP (ref 27–34)
MCHC RBC-ENTMCNC: 33.6 G/DL — SIGNIFICANT CHANGE UP (ref 32–36)
MCV RBC AUTO: 94 FL — SIGNIFICANT CHANGE UP (ref 80–100)
NRBC # BLD AUTO: 0 K/UL — SIGNIFICANT CHANGE UP (ref 0–0)
NRBC # FLD: 0 K/UL — SIGNIFICANT CHANGE UP (ref 0–0)
NRBC BLD AUTO-RTO: 0 /100 WBCS — SIGNIFICANT CHANGE UP (ref 0–0)
PHOSPHATE SERPL-MCNC: 3.2 MG/DL — SIGNIFICANT CHANGE UP (ref 2.5–4.5)
PLATELET # BLD AUTO: 268 K/UL — SIGNIFICANT CHANGE UP (ref 150–400)
PMV BLD: 11.1 FL — SIGNIFICANT CHANGE UP (ref 7–13)
POTASSIUM SERPL-MCNC: 3.9 MMOL/L — SIGNIFICANT CHANGE UP (ref 3.5–5.3)
POTASSIUM SERPL-SCNC: 3.9 MMOL/L — SIGNIFICANT CHANGE UP (ref 3.5–5.3)
RBC # BLD: 4.85 M/UL — SIGNIFICANT CHANGE UP (ref 3.8–5.2)
RBC # FLD: 13.9 % — SIGNIFICANT CHANGE UP (ref 10.3–14.5)
SODIUM SERPL-SCNC: 140 MMOL/L — SIGNIFICANT CHANGE UP (ref 135–145)
WBC # BLD: 5.61 K/UL — SIGNIFICANT CHANGE UP (ref 3.8–10.5)
WBC # FLD AUTO: 5.61 K/UL — SIGNIFICANT CHANGE UP (ref 3.8–10.5)

## 2025-07-29 RX ORDER — ESCITALOPRAM OXALATE 20 MG/1
3 TABLET ORAL
Qty: 0 | Refills: 0 | DISCHARGE
Start: 2025-07-29

## 2025-07-29 RX ORDER — FUROSEMIDE 10 MG/ML
1 INJECTION INTRAMUSCULAR; INTRAVENOUS
Qty: 30 | Refills: 0
Start: 2025-07-29 | End: 2025-08-27

## 2025-07-29 RX ADMIN — INSULIN LISPRO 2: 100 INJECTION, SOLUTION INTRAVENOUS; SUBCUTANEOUS at 11:19

## 2025-07-29 RX ADMIN — FUROSEMIDE 20 MILLIGRAM(S): 10 INJECTION INTRAMUSCULAR; INTRAVENOUS at 06:32

## 2025-07-29 RX ADMIN — MEMANTINE HYDROCHLORIDE 5 MILLIGRAM(S): 21 CAPSULE, EXTENDED RELEASE ORAL at 11:18

## 2025-07-29 RX ADMIN — POLYETHYLENE GLYCOL 3350 17 GRAM(S): 17 POWDER, FOR SOLUTION ORAL at 06:35

## 2025-07-29 RX ADMIN — Medication 40 MILLIGRAM(S): at 06:31

## 2025-07-29 RX ADMIN — LOSARTAN POTASSIUM 100 MILLIGRAM(S): 100 TABLET, FILM COATED ORAL at 06:31

## 2025-07-29 RX ADMIN — Medication 1 APPLICATION(S): at 11:22

## 2025-07-29 RX ADMIN — Medication 81 MILLIGRAM(S): at 11:18

## 2025-07-31 ENCOUNTER — APPOINTMENT (OUTPATIENT)
Dept: CARDIOLOGY | Facility: CLINIC | Age: 89
End: 2025-07-31
Payer: MEDICARE

## 2025-07-31 ENCOUNTER — NON-APPOINTMENT (OUTPATIENT)
Age: 89
End: 2025-07-31

## 2025-07-31 VITALS
OXYGEN SATURATION: 97 % | HEART RATE: 88 BPM | HEIGHT: 59 IN | WEIGHT: 146 LBS | DIASTOLIC BLOOD PRESSURE: 70 MMHG | SYSTOLIC BLOOD PRESSURE: 122 MMHG | RESPIRATION RATE: 18 BRPM | BODY MASS INDEX: 29.43 KG/M2

## 2025-07-31 DIAGNOSIS — E11.49 TYPE 2 DIABETES MELLITUS WITH OTHER DIABETIC NEUROLOGICAL COMPLICATION: ICD-10-CM

## 2025-07-31 DIAGNOSIS — I10 ESSENTIAL (PRIMARY) HYPERTENSION: ICD-10-CM

## 2025-07-31 DIAGNOSIS — I50.33 ACUTE ON CHRONIC DIASTOLIC (CONGESTIVE) HEART FAILURE: ICD-10-CM

## 2025-07-31 DIAGNOSIS — I22.2 SUBSEQUENT NON-ST ELEVATION (NSTEMI) MYOCARDIAL INFARCTION: ICD-10-CM

## 2025-07-31 PROCEDURE — 93000 ELECTROCARDIOGRAM COMPLETE: CPT

## 2025-07-31 PROCEDURE — 99214 OFFICE O/P EST MOD 30 MIN: CPT

## 2025-07-31 RX ORDER — FUROSEMIDE 40 MG/1
40 TABLET ORAL DAILY
Qty: 90 | Refills: 2 | Status: ACTIVE | COMMUNITY
Start: 1900-01-01 | End: 1900-01-01

## 2025-07-31 RX ORDER — SITAGLIPTIN 100 MG/1
100 TABLET, FILM COATED ORAL
Refills: 0 | Status: ACTIVE | COMMUNITY

## 2025-07-31 RX ORDER — CARVEDILOL 3.12 MG/1
3.12 TABLET, FILM COATED ORAL TWICE DAILY
Qty: 180 | Refills: 1 | Status: ACTIVE | COMMUNITY
Start: 2025-07-31 | End: 1900-01-01

## 2025-07-31 RX ORDER — HYDRALAZINE HYDROCHLORIDE 50 MG/1
50 TABLET ORAL
Refills: 0 | Status: ACTIVE | COMMUNITY

## 2025-07-31 RX ORDER — LOSARTAN POTASSIUM 100 MG/1
100 TABLET, FILM COATED ORAL
Refills: 0 | Status: ACTIVE | COMMUNITY

## 2025-07-31 RX ORDER — SENNOSIDES 8.6 MG/1
8.6 TABLET ORAL
Refills: 0 | Status: ACTIVE | COMMUNITY

## 2025-07-31 RX ORDER — EMPAGLIFLOZIN 10 MG/1
10 TABLET, FILM COATED ORAL
Refills: 0 | Status: ACTIVE | COMMUNITY

## 2025-07-31 RX ORDER — DOCUSATE SODIUM 100 MG
100 TABLET ORAL
Refills: 0 | Status: ACTIVE | COMMUNITY

## 2025-07-31 RX ORDER — REPAGLINIDE 0.5 MG/1
0.5 TABLET ORAL
Refills: 0 | Status: ACTIVE | COMMUNITY

## 2025-07-31 RX ORDER — AMLODIPINE BESYLATE 10 MG/1
10 TABLET ORAL
Refills: 0 | Status: DISCONTINUED | COMMUNITY
End: 2025-07-31

## 2025-07-31 RX ORDER — ESCITALOPRAM OXALATE 5 MG/1
5 TABLET ORAL
Refills: 0 | Status: ACTIVE | COMMUNITY

## 2025-07-31 RX ORDER — ATORVASTATIN CALCIUM 40 MG/1
40 TABLET, FILM COATED ORAL
Refills: 0 | Status: ACTIVE | COMMUNITY

## 2025-07-31 RX ORDER — POLYETHYLENE GLYCOL 3350
POWDER (GRAM) MISCELLANEOUS
Refills: 0 | Status: ACTIVE | COMMUNITY

## 2025-07-31 RX ORDER — MEMANTINE HYDROCHLORIDE 5 MG/1
5 TABLET ORAL
Refills: 0 | Status: ACTIVE | COMMUNITY

## 2025-07-31 RX ORDER — HYDRALAZINE HYDROCHLORIDE 50 MG/1
50 TABLET ORAL 3 TIMES DAILY
Qty: 270 | Refills: 0 | Status: ACTIVE | COMMUNITY
Start: 2025-07-31 | End: 1900-01-01

## 2025-07-31 RX ORDER — PANTOPRAZOLE SODIUM 40 MG/10ML
40 INJECTION, POWDER, FOR SOLUTION INTRAVENOUS
Refills: 0 | Status: ACTIVE | COMMUNITY

## 2025-07-31 RX ORDER — ASPIRIN 81 MG
81 TABLET, DELAYED RELEASE (ENTERIC COATED) ORAL
Refills: 0 | Status: ACTIVE | COMMUNITY

## 2025-08-28 ENCOUNTER — APPOINTMENT (OUTPATIENT)
Dept: GASTROENTEROLOGY | Facility: CLINIC | Age: 89
End: 2025-08-28
Payer: MEDICARE

## 2025-08-28 VITALS
BODY MASS INDEX: 28.22 KG/M2 | DIASTOLIC BLOOD PRESSURE: 66 MMHG | HEIGHT: 59 IN | WEIGHT: 140 LBS | SYSTOLIC BLOOD PRESSURE: 101 MMHG | OXYGEN SATURATION: 96 % | HEART RATE: 74 BPM

## 2025-08-28 DIAGNOSIS — R10.84 GENERALIZED ABDOMINAL PAIN: ICD-10-CM

## 2025-08-28 PROCEDURE — 99214 OFFICE O/P EST MOD 30 MIN: CPT

## 2025-08-28 PROCEDURE — G2211 COMPLEX E/M VISIT ADD ON: CPT

## 2025-08-28 RX ORDER — FAMOTIDINE 20 MG/1
20 TABLET, FILM COATED ORAL
Qty: 60 | Refills: 1 | Status: ACTIVE | COMMUNITY
Start: 2025-08-28 | End: 1900-01-01

## 2025-09-02 ENCOUNTER — NON-APPOINTMENT (OUTPATIENT)
Age: 89
End: 2025-09-02

## 2025-09-02 ENCOUNTER — APPOINTMENT (OUTPATIENT)
Dept: CARDIOLOGY | Facility: CLINIC | Age: 89
End: 2025-09-02
Payer: MEDICARE

## 2025-09-02 VITALS
RESPIRATION RATE: 18 BRPM | HEIGHT: 59 IN | SYSTOLIC BLOOD PRESSURE: 100 MMHG | BODY MASS INDEX: 32.46 KG/M2 | HEART RATE: 74 BPM | DIASTOLIC BLOOD PRESSURE: 66 MMHG | OXYGEN SATURATION: 97 % | WEIGHT: 161 LBS

## 2025-09-02 DIAGNOSIS — I50.33 ACUTE ON CHRONIC DIASTOLIC (CONGESTIVE) HEART FAILURE: ICD-10-CM

## 2025-09-02 DIAGNOSIS — E11.49 TYPE 2 DIABETES MELLITUS WITH OTHER DIABETIC NEUROLOGICAL COMPLICATION: ICD-10-CM

## 2025-09-02 DIAGNOSIS — I22.2 SUBSEQUENT NON-ST ELEVATION (NSTEMI) MYOCARDIAL INFARCTION: ICD-10-CM

## 2025-09-02 DIAGNOSIS — I10 ESSENTIAL (PRIMARY) HYPERTENSION: ICD-10-CM

## 2025-09-02 PROCEDURE — 99214 OFFICE O/P EST MOD 30 MIN: CPT

## 2025-09-02 PROCEDURE — G2211 COMPLEX E/M VISIT ADD ON: CPT

## 2025-09-02 PROCEDURE — 93000 ELECTROCARDIOGRAM COMPLETE: CPT

## 2025-09-04 ENCOUNTER — RESULT REVIEW (OUTPATIENT)
Age: 89
End: 2025-09-04

## 2025-09-08 ENCOUNTER — TRANSCRIPTION ENCOUNTER (OUTPATIENT)
Age: 89
End: 2025-09-08

## 2025-09-09 ENCOUNTER — TRANSCRIPTION ENCOUNTER (OUTPATIENT)
Age: 89
End: 2025-09-09

## 2025-09-12 ENCOUNTER — APPOINTMENT (OUTPATIENT)
Dept: CARDIOLOGY | Facility: CLINIC | Age: 89
End: 2025-09-12

## 2025-09-12 VITALS
HEART RATE: 85 BPM | DIASTOLIC BLOOD PRESSURE: 60 MMHG | HEIGHT: 59 IN | WEIGHT: 155 LBS | RESPIRATION RATE: 18 BRPM | BODY MASS INDEX: 31.25 KG/M2 | SYSTOLIC BLOOD PRESSURE: 102 MMHG | OXYGEN SATURATION: 98 %

## 2025-09-12 DIAGNOSIS — I50.33 ACUTE ON CHRONIC DIASTOLIC (CONGESTIVE) HEART FAILURE: ICD-10-CM

## 2025-09-12 DIAGNOSIS — I10 ESSENTIAL (PRIMARY) HYPERTENSION: ICD-10-CM

## 2025-09-15 LAB
ALBUMIN SERPL ELPH-MCNC: 2.6 G/DL
ALP BLD-CCNC: 181 U/L
ALT SERPL-CCNC: 37 U/L
ANION GAP SERPL CALC-SCNC: 9 MMOL/L
AST SERPL-CCNC: 32 U/L
BILIRUB SERPL-MCNC: 0.2 MG/DL
BUN SERPL-MCNC: 13 MG/DL
CALCIUM SERPL-MCNC: 8.7 MG/DL
CHLORIDE SERPL-SCNC: 97 MMOL/L
CHOLEST SERPL-MCNC: 182 MG/DL
CK SERPL-CCNC: 265 U/L
CO2 SERPL-SCNC: 31 MMOL/L
CREAT SERPL-MCNC: 0.93 MG/DL
EGFRCR SERPLBLD CKD-EPI 2021: 58 ML/MIN/1.73M2
ESTIMATED AVERAGE GLUCOSE: 148 MG/DL
GLUCOSE SERPL-MCNC: 127 MG/DL
HBA1C MFR BLD HPLC: 6.8 %
HCT VFR BLD CALC: 46.1 %
HDLC SERPL-MCNC: 59 MG/DL
HGB BLD-MCNC: 15 G/DL
LDLC SERPL-MCNC: 97 MG/DL
MCHC RBC-ENTMCNC: 31.3 PG
MCHC RBC-ENTMCNC: 32.5 G/DL
MCV RBC AUTO: 96 FL
NONHDLC SERPL-MCNC: 123 MG/DL
PLATELET # BLD AUTO: 299 K/UL
POTASSIUM SERPL-SCNC: 4.7 MMOL/L
PROT SERPL-MCNC: 5.7 G/DL
RBC # BLD: 4.8 M/UL
RBC # FLD: 14.7 %
SODIUM SERPL-SCNC: 137 MMOL/L
T4 FREE SERPL-MCNC: 1 NG/DL
TRIGL SERPL-MCNC: 149 MG/DL
TSH SERPL-ACNC: 2.4 UIU/ML
WBC # FLD AUTO: 5.66 K/UL